# Patient Record
Sex: MALE | Race: WHITE | Employment: OTHER | URBAN - METROPOLITAN AREA
[De-identification: names, ages, dates, MRNs, and addresses within clinical notes are randomized per-mention and may not be internally consistent; named-entity substitution may affect disease eponyms.]

---

## 2019-04-10 ENCOUNTER — HOSPITAL ENCOUNTER (INPATIENT)
Age: 74
LOS: 22 days | Discharge: HOME HEALTH | DRG: 560 | End: 2019-05-02
Attending: PHYSICAL MEDICINE & REHABILITATION | Admitting: PHYSICAL MEDICINE & REHABILITATION
Payer: OTHER GOVERNMENT

## 2019-04-10 DIAGNOSIS — F41.9 ANXIETY: ICD-10-CM

## 2019-04-10 DIAGNOSIS — S32.82XD MULTIPLE CLOSED FRACTURES OF PELVIS WITHOUT DISRUPTION OF PELVIC RING WITH ROUTINE HEALING: Primary | ICD-10-CM

## 2019-04-10 PROBLEM — R53.81 DEBILITY: Status: ACTIVE | Noted: 2019-04-10

## 2019-04-10 PROCEDURE — 6370000000 HC RX 637 (ALT 250 FOR IP): Performed by: PHYSICAL MEDICINE & REHABILITATION

## 2019-04-10 PROCEDURE — 94640 AIRWAY INHALATION TREATMENT: CPT

## 2019-04-10 PROCEDURE — 1280000000 HC REHAB R&B

## 2019-04-10 RX ORDER — IPRATROPIUM BROMIDE AND ALBUTEROL SULFATE 2.5; .5 MG/3ML; MG/3ML
1 SOLUTION RESPIRATORY (INHALATION) EVERY 4 HOURS PRN
Status: DISCONTINUED | OUTPATIENT
Start: 2019-04-10 | End: 2019-05-02 | Stop reason: HOSPADM

## 2019-04-10 RX ORDER — DIPHENHYDRAMINE HCL 25 MG
25 TABLET ORAL EVERY 6 HOURS PRN
Status: DISCONTINUED | OUTPATIENT
Start: 2019-04-10 | End: 2019-05-02 | Stop reason: HOSPADM

## 2019-04-10 RX ORDER — PANTOPRAZOLE SODIUM 40 MG/1
40 TABLET, DELAYED RELEASE ORAL
Status: DISCONTINUED | OUTPATIENT
Start: 2019-04-11 | End: 2019-05-02 | Stop reason: HOSPADM

## 2019-04-10 RX ORDER — GABAPENTIN 300 MG/1
1200 CAPSULE ORAL 3 TIMES DAILY
Status: DISCONTINUED | OUTPATIENT
Start: 2019-04-10 | End: 2019-05-02 | Stop reason: HOSPADM

## 2019-04-10 RX ORDER — HYDRALAZINE HYDROCHLORIDE 25 MG/1
50 TABLET, FILM COATED ORAL EVERY 6 HOURS PRN
Status: DISCONTINUED | OUTPATIENT
Start: 2019-04-10 | End: 2019-05-02 | Stop reason: HOSPADM

## 2019-04-10 RX ORDER — ONDANSETRON 4 MG/1
8 TABLET, ORALLY DISINTEGRATING ORAL EVERY 8 HOURS PRN
Status: DISCONTINUED | OUTPATIENT
Start: 2019-04-10 | End: 2019-05-02 | Stop reason: HOSPADM

## 2019-04-10 RX ORDER — OXYCODONE HYDROCHLORIDE 5 MG/1
5 TABLET ORAL EVERY 4 HOURS PRN
Status: DISCONTINUED | OUTPATIENT
Start: 2019-04-10 | End: 2019-05-02 | Stop reason: HOSPADM

## 2019-04-10 RX ORDER — ACETAMINOPHEN 325 MG/1
650 TABLET ORAL EVERY 4 HOURS PRN
Status: DISCONTINUED | OUTPATIENT
Start: 2019-04-10 | End: 2019-05-02 | Stop reason: HOSPADM

## 2019-04-10 RX ORDER — TRAZODONE HYDROCHLORIDE 50 MG/1
50 TABLET ORAL NIGHTLY PRN
Status: DISCONTINUED | OUTPATIENT
Start: 2019-04-10 | End: 2019-04-11

## 2019-04-10 RX ORDER — PRAVASTATIN SODIUM 40 MG
40 TABLET ORAL NIGHTLY
Status: DISCONTINUED | OUTPATIENT
Start: 2019-04-10 | End: 2019-05-02 | Stop reason: HOSPADM

## 2019-04-10 RX ORDER — AMLODIPINE BESYLATE 5 MG/1
5 TABLET ORAL DAILY
Status: DISCONTINUED | OUTPATIENT
Start: 2019-04-10 | End: 2019-05-02 | Stop reason: HOSPADM

## 2019-04-10 RX ORDER — BACLOFEN 10 MG/1
10 TABLET ORAL 3 TIMES DAILY
Status: DISCONTINUED | OUTPATIENT
Start: 2019-04-10 | End: 2019-05-02 | Stop reason: HOSPADM

## 2019-04-10 RX ORDER — GUAIFENESIN 600 MG/1
600 TABLET, EXTENDED RELEASE ORAL 2 TIMES DAILY
Status: DISCONTINUED | OUTPATIENT
Start: 2019-04-10 | End: 2019-04-11

## 2019-04-10 RX ORDER — TAMSULOSIN HYDROCHLORIDE 0.4 MG/1
0.4 CAPSULE ORAL DAILY
Status: DISCONTINUED | OUTPATIENT
Start: 2019-04-11 | End: 2019-05-02 | Stop reason: HOSPADM

## 2019-04-10 RX ORDER — OXYCODONE HYDROCHLORIDE 5 MG/1
10 TABLET ORAL EVERY 4 HOURS PRN
Status: DISCONTINUED | OUTPATIENT
Start: 2019-04-10 | End: 2019-05-02 | Stop reason: HOSPADM

## 2019-04-10 RX ORDER — ASPIRIN 81 MG/1
81 TABLET, CHEWABLE ORAL DAILY
Status: DISCONTINUED | OUTPATIENT
Start: 2019-04-10 | End: 2019-05-02 | Stop reason: HOSPADM

## 2019-04-10 RX ADMIN — OXYCODONE HYDROCHLORIDE 10 MG: 5 TABLET ORAL at 22:31

## 2019-04-10 RX ADMIN — ASPIRIN 81 MG 81 MG: 81 TABLET ORAL at 22:31

## 2019-04-10 RX ADMIN — BACLOFEN 10 MG: 10 TABLET ORAL at 22:31

## 2019-04-10 RX ADMIN — Medication 2 PUFF: at 20:11

## 2019-04-10 RX ADMIN — PRAVASTATIN SODIUM 40 MG: 40 TABLET ORAL at 22:31

## 2019-04-10 RX ADMIN — GABAPENTIN 1200 MG: 300 CAPSULE ORAL at 22:31

## 2019-04-10 ASSESSMENT — PAIN SCALES - GENERAL
PAINLEVEL_OUTOF10: 8
PAINLEVEL_OUTOF10: 8

## 2019-04-10 NOTE — PLAN OF CARE
[address]   [phone]    Lakshmi Cunningham    : 1945  Acct #: [de-identified]  MRN: [id number]   PHYSICIAN:  Gagandeep Gagnon MD  Primary Problem    Patient Active Problem List   Diagnosis    Debility       Rehabilitation Diagnosis:     Debility [R53.81]     2ADMIT DATE:4/10/2019    Patient Goals:Return to PLOF    Admitting Impairments:COPD, Anxiety,   Barriers: pain  Participation: Good pt verbalizes he will participate in all therapy. CARE PLAN     NURSING:  Lakshmi Cunningham while on this unit will:     [] Be continent of bowel and bladder     [x] Have an adequate number of bowel movements  [] Urinate with no urinary retention >300ml in bladder  [] Complete bladder protocol with bar removal  [] Maintain O2 SATs at ___%  [x] Have pain managed while on ARU       [] Be pain free by discharge   [x] Have no skin breakdown while on ARU  [] Have improved skin integrity via wound measurements  [] Have no signs/symptoms of infection at the wound site  [x] Be free from injury during hospitalization   [] Complete education with patient/family with understanding demonstrated for:  [] Adjustment   [] Other:   Nursing interventions may include bowel/bladder training, education for medical assistive devices, medication education, O2 saturation management, energy conservation, stress management techniques, fall prevention, alarms protocol, seating and positioning, skin/wound care, pressure relief instruction,dressing changes,  infection protection, DVT prophylaxis, and/or assistance with in room safety with transfers to bed, toilet, wheelchair, shower as well as bathroom activities and hygiene.      Patient/caregiver education for:   [] Disease/sustained injury/management      [x] Medication Use   [] Surgical intervention   [x] Safety   [x] Body mechanics and or joint protection   [x] Health maintenance         PHYSICAL THERAPY:  Goals: Short term goals  Time Frame for Short term goals: To be met by 4/18/19  Short term goal 1: Pt to perform supine to/from sit transfers at flat bed without rails with supervision. Short term goal 2: Pt to perform sit to/from stand at [facility] with CGA. Short term goal 3: Pt to ambulate 50 ft with LRAD with CGA for household mobility. Short term goal 4: Pt to propel wheelchair 50 ft on level surface with mod I for household mobility. Short term goal 5: Pt to demonstrate car transfer with RW with mod A x 1. Long term goals  Time Frame for Long term goals : To be met by 4/25/19  Long term goal 1: Pt to perform supine to/from sit transfers at flat bed without rails with mod I.  Long term goal 2: Pt to perform sit to/from stand at [facility] with mod I.  Long term goal 3: Pt to ambulate 150 ft with LRAD with mod I for community mobility. Long term goal 4: Pt to manage 1 curb step with wheelchair or LRAD with mod I for home entry. Long term goal 5: Pt to demonstrate car transfer with RW with min A x 1. These goals were reviewed with this patient at the time of assessment and Gokul Mccrary is in agreement.      Plan of Care: Pt to be seen 5 out of 7 days per week, 90   mins (exact) per day for 14days (exact)                   Current Treatment Recommendations: Strengthening, Functional Mobility Training, Wheelchair Mobility Training, Neuromuscular Re-education, Home Exercise Program, Equipment Evaluation, Education, & procurement, Safety Education & Training, Gait Training, Transfer Training, ROM, Balance Training, Endurance Training, Stair training, Pain Management, Patient/Caregiver Education & Training, Positioning      OCCUPATIONAL THERAPY:  Goals:             Short term goals  Time Frame for Short term goals: to be met by 4/18/19  Short term goal 1: Pt will complete LB ADLs with min A with AE as needed  Short term goal 2: Pt will complete functional transfers with Min A  Short term goal 3: Pt will complete toileting with min A or less  Short term goal 4: pt will adhere to WB status with all transfers and mobility :  Long term goals  Time Frame for Long term goals : to be met 4/25/19  Long term goal 1: Pt will complete ADLs Mod I with AE as needed  Long term goal 2: Pt will complete all fucntional transfers Mod I  Long term goal 3: Pt will complete toileting Mod I  Long term goal 4: Pt will tolerate standing at sink for grooming tasks >5 min w/o fatigue or LOB   Long term goal 5: Pt will complete light home mgmt/meal prep tasks Mod I :    These goals were reviewed with this patient at the time of assessment and Delmar Chavira is in agreement    Plan of Care:  Pt to be seen 5 out of 7 days per week, 90 mins (exact) per day for 14 days (exact)  Current Treatment Recommendations: Strengthening, Balance Training, Functional Mobility Training, Endurance Training, Patient/Caregiver Education & Training, Equipment Evaluation, Education, & procurement, Self-Care / ADL      SPEECH THERAPY: Goals will be left blank if speech is not following this patient. Goals: These goals were reviewed with this patient at the time of assessment and Delmar Chavira is in agreement    Plan of Care: Pt to be seen 5 out of 7 days per week, 180  mins (exact) per day for 12 days (exact)             Dysphagia:             Speech/Language/Cognition:       CASE MANAGEMENT:  Goals:   Assist patient/family with discharge planning, patient/family counseling,   and coordination with insurance during ARU stay.       Admission Period/Goal FIM SCORES  FIM Admit/Goal Score   Eating/Swallowing    Grooming    Bathing    Upper Body Dressing    Lower Body Dressing    Toileting    Bladder Dewayne, Accidents = FIM    Bowel  Dewayne, Accidents = FIM    Bed/Chair Transfer    Toilet Transfer    Tub/Shower Transfer     Locomotion:  Ambulation (Walk) / Wheelchair:  W = walk , w/c = wheelchair  Distance: resection, and RCC s/p partial nephrectomy who initially presented to [facility] on 4/2/19 after sustaining a fall. The patient fell approximately 5 feet from ladder. No LOC. Found to have right acetabular fx, right superior and inferior pubic rami fx, and sacral fx. Non-operative management and toe touch weight bearing was recommended on the RLE. Also with intramuscular hematoma in right obturator and priformis. Course was complicated by COPD exacerbation. Treated with steroids and bronchodilators. Now presents to ARU with impaired mobility and self-care below his baseline. Currently, patient reports moderate to severe right hip, lower abdomen, and groin pain. He denies any radiating tingling/numbness. Pain is worse with movement. Improved with rest and medication. He reports his breathing is improving but still has productive cough. Patient is feeling generally anxious. He has a history of PTSD and has not been receiving his home lorazepam.         This plan has been reviewed with Maday Armenta on ____________  in a language the patient understands. Maday Armenta has had the opportunity to include input with the therapy team.      I have reviewed this initial plan of care and agree with its contents:    Title   Name    Date    Time    Physician: Dr. Ale Dorsey    Case Mgmt: Dano Suárez MSW, LSW    OT: Kian Harris OTR/L    PT: Michel Luis, PT, DPT   #[id number]     RN: Liliana Whitaker RN    ST:    : Marciano Ewing.  Lisandra Keenan     Other:

## 2019-04-11 LAB
A/G RATIO: 1.1 (ref 1.1–2.2)
ALBUMIN SERPL-MCNC: 3.5 G/DL (ref 3.4–5)
ALP BLD-CCNC: 79 U/L (ref 40–129)
ALT SERPL-CCNC: 20 U/L (ref 10–40)
ANION GAP SERPL CALCULATED.3IONS-SCNC: 9 MMOL/L (ref 3–16)
AST SERPL-CCNC: 21 U/L (ref 15–37)
BASOPHILS ABSOLUTE: 0 K/UL (ref 0–0.2)
BASOPHILS RELATIVE PERCENT: 0.3 %
BILIRUB SERPL-MCNC: 1.1 MG/DL (ref 0–1)
BUN BLDV-MCNC: 24 MG/DL (ref 7–20)
CALCIUM SERPL-MCNC: 8.7 MG/DL (ref 8.3–10.6)
CHLORIDE BLD-SCNC: 97 MMOL/L (ref 99–110)
CO2: 29 MMOL/L (ref 21–32)
CREAT SERPL-MCNC: 1 MG/DL (ref 0.8–1.3)
EOSINOPHILS ABSOLUTE: 0.1 K/UL (ref 0–0.6)
EOSINOPHILS RELATIVE PERCENT: 1.2 %
GFR AFRICAN AMERICAN: >60
GFR NON-AFRICAN AMERICAN: >60
GLOBULIN: 3.1 G/DL
GLUCOSE BLD-MCNC: 95 MG/DL (ref 70–99)
HCT VFR BLD CALC: 35.6 % (ref 40.5–52.5)
HEMOGLOBIN: 12.3 G/DL (ref 13.5–17.5)
LYMPHOCYTES ABSOLUTE: 0.8 K/UL (ref 1–5.1)
LYMPHOCYTES RELATIVE PERCENT: 7.1 %
MCH RBC QN AUTO: 31.5 PG (ref 26–34)
MCHC RBC AUTO-ENTMCNC: 34.6 G/DL (ref 31–36)
MCV RBC AUTO: 91.1 FL (ref 80–100)
MONOCYTES ABSOLUTE: 0.9 K/UL (ref 0–1.3)
MONOCYTES RELATIVE PERCENT: 8.5 %
NEUTROPHILS ABSOLUTE: 8.8 K/UL (ref 1.7–7.7)
NEUTROPHILS RELATIVE PERCENT: 82.9 %
PDW BLD-RTO: 13.2 % (ref 12.4–15.4)
PLATELET # BLD: 348 K/UL (ref 135–450)
PMV BLD AUTO: 7.2 FL (ref 5–10.5)
POTASSIUM REFLEX MAGNESIUM: 4.8 MMOL/L (ref 3.5–5.1)
RBC # BLD: 3.91 M/UL (ref 4.2–5.9)
SODIUM BLD-SCNC: 135 MMOL/L (ref 136–145)
TOTAL PROTEIN: 6.6 G/DL (ref 6.4–8.2)
WBC # BLD: 10.6 K/UL (ref 4–11)

## 2019-04-11 PROCEDURE — 97535 SELF CARE MNGMENT TRAINING: CPT

## 2019-04-11 PROCEDURE — 85025 COMPLETE CBC W/AUTO DIFF WBC: CPT

## 2019-04-11 PROCEDURE — 97162 PT EVAL MOD COMPLEX 30 MIN: CPT

## 2019-04-11 PROCEDURE — 1280000000 HC REHAB R&B

## 2019-04-11 PROCEDURE — 97530 THERAPEUTIC ACTIVITIES: CPT

## 2019-04-11 PROCEDURE — 36415 COLL VENOUS BLD VENIPUNCTURE: CPT

## 2019-04-11 PROCEDURE — 6370000000 HC RX 637 (ALT 250 FOR IP): Performed by: PHYSICAL MEDICINE & REHABILITATION

## 2019-04-11 PROCEDURE — 80053 COMPREHEN METABOLIC PANEL: CPT

## 2019-04-11 PROCEDURE — 97166 OT EVAL MOD COMPLEX 45 MIN: CPT

## 2019-04-11 PROCEDURE — 94640 AIRWAY INHALATION TREATMENT: CPT

## 2019-04-11 PROCEDURE — 97542 WHEELCHAIR MNGMENT TRAINING: CPT

## 2019-04-11 PROCEDURE — 97110 THERAPEUTIC EXERCISES: CPT

## 2019-04-11 PROCEDURE — 97116 GAIT TRAINING THERAPY: CPT

## 2019-04-11 RX ORDER — SENNA AND DOCUSATE SODIUM 50; 8.6 MG/1; MG/1
1 TABLET, FILM COATED ORAL 2 TIMES DAILY
Status: DISCONTINUED | OUTPATIENT
Start: 2019-04-11 | End: 2019-05-02 | Stop reason: HOSPADM

## 2019-04-11 RX ORDER — BUDESONIDE AND FORMOTEROL FUMARATE DIHYDRATE 160; 4.5 UG/1; UG/1
2 AEROSOL RESPIRATORY (INHALATION) 2 TIMES DAILY
COMMUNITY

## 2019-04-11 RX ORDER — LORAZEPAM 1 MG/1
1 TABLET ORAL NIGHTLY PRN
Status: DISCONTINUED | OUTPATIENT
Start: 2019-04-11 | End: 2019-05-02 | Stop reason: HOSPADM

## 2019-04-11 RX ORDER — ASPIRIN 81 MG/1
81 TABLET ORAL DAILY
Status: ON HOLD | COMMUNITY
End: 2019-05-02 | Stop reason: SDUPTHER

## 2019-04-11 RX ORDER — OMEPRAZOLE 20 MG/1
20 CAPSULE, DELAYED RELEASE ORAL DAILY
COMMUNITY

## 2019-04-11 RX ORDER — AMLODIPINE BESYLATE 2.5 MG/1
5 TABLET ORAL DAILY
Status: ON HOLD | COMMUNITY
End: 2019-05-02 | Stop reason: SDUPTHER

## 2019-04-11 RX ORDER — LORAZEPAM 2 MG/1
2 TABLET ORAL EVERY 6 HOURS PRN
Status: ON HOLD | COMMUNITY
End: 2019-05-02 | Stop reason: SDUPTHER

## 2019-04-11 RX ORDER — PRAVASTATIN SODIUM 40 MG
40 TABLET ORAL DAILY
Status: ON HOLD | COMMUNITY
End: 2019-05-02 | Stop reason: SDUPTHER

## 2019-04-11 RX ORDER — BACLOFEN 10 MG/1
10 TABLET ORAL 3 TIMES DAILY
Status: ON HOLD | COMMUNITY
End: 2019-05-02 | Stop reason: SDUPTHER

## 2019-04-11 RX ORDER — TAMSULOSIN HYDROCHLORIDE 0.4 MG/1
0.4 CAPSULE ORAL NIGHTLY
Status: ON HOLD | COMMUNITY
End: 2019-05-02 | Stop reason: SDUPTHER

## 2019-04-11 RX ORDER — MAGNESIUM OXIDE 400 MG/1
210 TABLET ORAL DAILY
Status: ON HOLD | COMMUNITY
End: 2019-05-02 | Stop reason: SDUPTHER

## 2019-04-11 RX ORDER — GUAIFENESIN 600 MG/1
600 TABLET, EXTENDED RELEASE ORAL 2 TIMES DAILY WITH MEALS
Status: DISCONTINUED | OUTPATIENT
Start: 2019-04-11 | End: 2019-05-02 | Stop reason: HOSPADM

## 2019-04-11 RX ORDER — GABAPENTIN 300 MG/1
300 CAPSULE ORAL 3 TIMES DAILY
Status: ON HOLD | COMMUNITY
End: 2019-05-02 | Stop reason: SDUPTHER

## 2019-04-11 RX ADMIN — GABAPENTIN 1200 MG: 300 CAPSULE ORAL at 21:37

## 2019-04-11 RX ADMIN — OXYCODONE HYDROCHLORIDE 10 MG: 5 TABLET ORAL at 21:45

## 2019-04-11 RX ADMIN — TIOTROPIUM BROMIDE 18 MCG: 18 CAPSULE ORAL; RESPIRATORY (INHALATION) at 07:20

## 2019-04-11 RX ADMIN — PRAVASTATIN SODIUM 40 MG: 40 TABLET ORAL at 21:37

## 2019-04-11 RX ADMIN — BACLOFEN 10 MG: 10 TABLET ORAL at 10:41

## 2019-04-11 RX ADMIN — Medication 2 PUFF: at 07:21

## 2019-04-11 RX ADMIN — ASPIRIN 81 MG 81 MG: 81 TABLET ORAL at 10:41

## 2019-04-11 RX ADMIN — OXYCODONE HYDROCHLORIDE 10 MG: 5 TABLET ORAL at 10:55

## 2019-04-11 RX ADMIN — MAGNESIUM GLUCONATE 500 MG ORAL TABLET 400 MG: 500 TABLET ORAL at 10:41

## 2019-04-11 RX ADMIN — GABAPENTIN 1200 MG: 300 CAPSULE ORAL at 14:30

## 2019-04-11 RX ADMIN — BACLOFEN 10 MG: 10 TABLET ORAL at 14:30

## 2019-04-11 RX ADMIN — GABAPENTIN 1200 MG: 300 CAPSULE ORAL at 10:41

## 2019-04-11 RX ADMIN — PANTOPRAZOLE SODIUM 40 MG: 40 TABLET, DELAYED RELEASE ORAL at 06:22

## 2019-04-11 RX ADMIN — TAMSULOSIN HYDROCHLORIDE 0.4 MG: 0.4 CAPSULE ORAL at 10:40

## 2019-04-11 RX ADMIN — AMLODIPINE BESYLATE 5 MG: 5 TABLET ORAL at 10:41

## 2019-04-11 RX ADMIN — SENNOSIDES AND DOCUSATE SODIUM 1 TABLET: 8.6; 5 TABLET ORAL at 21:37

## 2019-04-11 RX ADMIN — Medication 2 PUFF: at 20:59

## 2019-04-11 RX ADMIN — LORAZEPAM 1 MG: 1 TABLET ORAL at 21:37

## 2019-04-11 RX ADMIN — VITAMIN D, TAB 1000IU (100/BT) 1000 UNITS: 25 TAB at 10:40

## 2019-04-11 RX ADMIN — BACLOFEN 10 MG: 10 TABLET ORAL at 21:37

## 2019-04-11 RX ADMIN — GUAIFENESIN 600 MG: 600 TABLET, EXTENDED RELEASE ORAL at 17:19

## 2019-04-11 ASSESSMENT — PAIN DESCRIPTION - LOCATION
LOCATION: GROIN

## 2019-04-11 ASSESSMENT — PAIN SCALES - GENERAL
PAINLEVEL_OUTOF10: 4
PAINLEVEL_OUTOF10: 4
PAINLEVEL_OUTOF10: 9
PAINLEVEL_OUTOF10: 8

## 2019-04-11 ASSESSMENT — PAIN DESCRIPTION - PAIN TYPE: TYPE: ACUTE PAIN

## 2019-04-11 NOTE — PLAN OF CARE
Nutrition Problem: Inadequate oral intake  Intervention: Food and/or Nutrient Delivery: Continue current diet, Start ONS  Nutritional Goals: Pt will have PO intakes of 50% or greater during ARU stay

## 2019-04-11 NOTE — H&P
Department of Physical Medicine & Rehabilitation  History & Physical      Patient Identification:  Tony Jacobs  : 1945  Admit date: 4/10/2019   Attending provider: Opal Carter MD        Primary care provider: No primary care provider on file. Chief Complaint: right hip pain    History of Present Illness/Hospital Course:  Mr. Tony Jacobs is a 69 yo M with pmh HTN, HLD, COPD, lumbar radiculopathy, laryngeal cancer s/p resection, and RCC s/p partial nephrectomy who initially presented to [facility] on 19 after sustaining a fall. The patient fell approximately 5 feet from ladder. No LOC. Found to have right acetabular fx, right superior and inferior pubic rami fx, and sacral fx. Non-operative management and toe touch weight bearing was recommended on the RLE. Also with intramuscular hematoma in right obturator and priformis. Course was complicated by COPD exacerbation. Treated with steroids and bronchodilators. Now presents to ARU with impaired mobility and self-care below his baseline. Currently, patient reports moderate to severe right hip, lower abdomen, and groin pain. He denies any radiating tingling/numbness. Pain is worse with movement. Improved with rest and medication. He reports his breathing is improving but still has productive cough. Patient is feeling generally anxious.  He has a history of PTSD and has not been receiving his home lorazepam.     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)  Home set-up: home with [address], 1 step to enter    Past Medical History:   Diagnosis Date    Allergic rhinitis     Anxiety     [facility]     [facility]se[facility] of lung [facility]     GERD (gastroesophageal reflux disease)     Hypercholesteremia     Hypertension     Lumbar radiculopathy     PTSD (post-traumatic stress immediate release tablet 5 mg  5 mg Oral Q4H PRN Abhilash Verdin MD        Or    oxyCODONE (ROXICODONE) immediate release tablet 10 mg  10 mg Oral Q4H PRN Abhilash Verdin MD   10 mg at 04/11/19 1055    amLODIPine (NORVASC) tablet 5 mg  5 mg Oral Daily Abhilash Verdin MD   5 mg at 04/11/19 1041    aspirin chewable tablet 81 mg  81 mg Oral Daily Abhilash Verdin MD   81 mg at 04/11/19 1041    baclofen (LIORESAL) tablet 10 mg  10 mg Oral TID Abhilash Verdin MD   10 mg at 04/11/19 1041    mometasone-formoterol (DULERA) 200-5 MCG/ACT inhaler 2 puff  2 puff Inhalation BID Abhilash Verdin MD   2 puff at 04/11/19 0721    vitamin D (CHOLECALCIFEROL) tablet 1,000 Units  1,000 Units Oral Daily Abhilash Verdin MD   1,000 Units at 04/11/19 1040    tamsulosin (FLOMAX) capsule 0.4 mg  0.4 mg Oral Daily Abhilash Verdin MD   0.4 mg at 04/11/19 1040    gabapentin (NEURONTIN) capsule 1,200 mg  1,200 mg Oral TID Abhilash Verdin MD   1,200 mg at 04/11/19 1041    magnesium oxide (MAG-OX) tablet 400 mg  400 mg Oral Daily Abhilash Verdin MD   400 mg at 04/11/19 1041    pantoprazole (PROTONIX) tablet 40 mg  40 mg Oral QAM AC Abhilash Verdin MD   40 mg at 04/11/19 0622    pravastatin (PRAVACHOL) tablet 40 mg  40 mg Oral Nightly Abhilash Verdin MD   40 mg at 04/10/19 2231    tiotropium (SPIRIVA) inhalation capsule 18 mcg  18 mcg Inhalation Daily Abhilash Verdin MD   18 mcg at 04/11/19 0720    acetaminophen (TYLENOL) tablet 650 mg  650 mg Oral Q4H PRN Abhilash Verdin MD        magnesium hydroxide (MILK OF MAGNESIA) 400 MG/5ML suspension 30 mL  30 mL Oral Daily PRN Abhilash Verdin MD        enoxaparin (LOVENOX) injection 40 mg  40 mg Subcutaneous Daily Abhilash Verdin MD             REVIEW OF SYSTEMS:   CONSTITUTIONAL: negative for fevers, chills, diaphoresis, appetite change, night sweats, unexpected weight change, +fatigue.     EYES: negative for blurred vision, eye discharge, visual disturbance and icterus. HEENT: negative for hearing loss, tinnitus, ear drainage, sinus pressure, nasal congestion, epistaxis and snoring. RESPIRATORY: Negative for hemoptysis, +cough, sputum production. CARDIOVASCULAR: negative for chest pain, palpitations, exertional chest pressure/discomfort, syncope, edema   GASTROINTESTINAL: negative for nausea, vomiting, diarrhea, blood in stool, abdominal pain, constipation. GENITOURINARY: negative for frequency, dysuria, urinary incontinence, decreased urine volume, and hematuria. HEMATOLOGIC/LYMPHATIC: negative for easy bruising, bleeding and lymphadenopathy. ALLERGIC/IMMUNOLOGIC: negative for recurrent infections, angioedema, anaphylaxis and drug reactions. ENDOCRINE: negative for weight changes and diabetic symptoms including polyuria, polydipsia and polyphagia. MUSCULOSKELETAL: refer to HPI  NEUROLOGICAL: negative for headaches, slurred speech, unilateral weakness. PSYCHIATRIC/BEHAVIORAL: negative for hallucinations, behavioral problems, confusion and agitation. +anxiety    All pertinent positives are noted in the HPI. Physical Examination:  Vitals:   Patient Vitals for the past 24 hrs:   BP Temp Temp src Pulse Resp SpO2 Height Weight   04/11/19 1030 114/69 97.9 °F (36.6 °C) Oral 83 18 93 % -- --   04/11/19 0723 -- -- -- -- -- 93 % -- --   04/11/19 0615 -- -- -- -- -- -- -- 174 lb 2.6 oz (79 kg)   04/10/19 2011 -- -- -- -- 18 93 % -- --   04/1945 (!) 144/55 98.3 °F (36.8 °C) Oral 73 20 91 % -- --   04/10/19 1815 (!) 110/54 98.7 °F (37.1 °C) Oral 78 18 92 % 6' (1.829 m) --       Const: Alert. WDWN. No distress  Eyes: Conjunctiva noninjected, no icterus noted; pupils equal, round, and reactive to light. HENT: Atraumatic, normocephalic; Oral mucosa moist  Neck: Trachea midline, neck supple. No thyromegaly noted.   CV: Regular rate and rhythm, no murmur rub or gallop noted  Resp: Lung sounds distant but clear to auscultation bilaterally, no rales wheezes or ronchi, no retractions. Respirations unlabored. GI: Soft, nontender, nondistended. Normal bowel sounds. No palpable masses. Skin: Normal temperature and turgor. No rashes or breakdown noted. Ext: No significant edema appreciated. No varicosities. MSK: Right hip ROM limited by pain. No joint erythema, warmth noted. AROM otherwise intact. Neuro:   -Mental status: Alert. Oriented to person, place, time, situation.   -Language: Speech fluent  -Cranial nerves: VFF, PERRL, EOMI, Facial sensation intact, Face symmetric, Hearing intact, Palate elevation symmetric, Shoulder shrug intact. Tongue midline.   -Sensation intact to light touch. -Motor examination reveals normal strength in all four limbs diffusely (RLE not fully tested due to weight bearing restrictions). -No abnormalities with finger/nose noted. -Reflexes diminished, symmetric. Negative Irasema  Psych: Stable mood, normal judgement, normal affect     Lab Results   Component Value Date    WBC 10.6 04/11/2019    HGB 12.3 (L) 04/11/2019    HCT 35.6 (L) 04/11/2019    MCV 91.1 04/11/2019     04/11/2019     No results found for: INR, PROTIME  Lab Results   Component Value Date    CREATININE 1.0 04/11/2019    BUN 24 (H) 04/11/2019     (L) 04/11/2019    K 4.8 04/11/2019    CL 97 (L) 04/11/2019    CO2 29 04/11/2019     Lab Results   Component Value Date    ALT 20 04/11/2019    AST 21 04/11/2019    ALKPHOS 79 04/11/2019    BILITOT 1.1 (H) 04/11/2019       Available imaging, medical testing, and laboratory data from  has been reviewed. WBC 9.8, Hgb 11.3, plt 231  Na 136, K 4.3, BUN 28, C 1.05    CT Pelvis  Acute non-diplaced fracture of the right puboacetabular junction, involving the anterosuperior margin of the right acetabulum. Acute comminuted segmental fracture of the right inferior pubic ramus with extension to the inferior right pubic body.    Acute minimally displaced fracture of the right hemisacrum with extension to the right S1 and S2 neural formaina, as well as transverse fracture plan through S2-3 sacral body. Multifocal intramuscular hematomas involving the right piriformis, right obturator externus, and right obturator internus muscles. CXR - no acute cardiopulmonary process    CT Chest/Abdomen/Pelvis  No acute trumatic abnormality. Circumferential wall thickening of the distal esophagus may rosario related to esophagitis. Similar appearance of multiple previously described pelvic fractures as well as asymmetric enlargement/intramuscular hematoma involving right obturator and piriformis musculature. Stranding osei the rightward aspect of the bladder without deifinite evidence of bladder injury otherwise. CT Head   No acute intracranial hemorrhage or mass effect. Chronic small vessel ischemic changes and diffuse cerebral atrophy. CT Cervical Spine  No acute fracture or subluxation. CT Thoracic spine  Mild height loss of T12, likely chronic. No acute fracture. CT Lumbar spine  No acute fracture. Pelvic fractures reported separately. Body mass index is 23.62 kg/m². POST ADMISSION PHYSICIAN EVALUATION  The patient has agreed to being admitted to our comprehensive inpatient  rehabilitation facility consisting of at least 180 minutes of therapy a day,  5 out of 7 days a week. The patient/family has a good understanding of our discharge process. The  patient has potential to make improvement and is in need of at least two of  the following multidisciplinary therapies including but not limited to  physical, occupational, respiratory, and speech, nutritional services, wound care, and prosthetics and orthotics. Given the patients complex condition  and risk of further medical complications, rehabilitation services cannot be  safely provided at a lower level of care such as a skilled nursing facility.   I have compared the patients medical and functional status at the time of the  preadmission screening and the same on this date, and there are no significant changes. By signing this document, I acknowledge that I have personally performed a  full physical examination on this patient within 24 hours of admission to  this inpatient rehabilitation facility and have determined the patient to be  able to tolerate the above course of treatment at an intensive level for a  reasonable period of time. I will be completing a detailed individualized  Plan of Care for this patient by day four of the patients stay based upon the  Preadmission Screen, this Post-Admission Evaluation, and the therapy  evaluations. Barriers: TTWB status RLE, decreased balance, endurance, respiratory insufficiency, medical comorbidities  Services Required: PT, OT  Goals: Mandy for mobility and ADLs  Prognosis: Good  Anticipated Dispo: home with son  ELOS: 7-10 days    Rehabilitation Diagnosis:   Orthopedic, 8.11, Unilateral Hip Fracture      Assessment and Plan:    Right acetabulum, inferior/superior pubic rami, and sacral fractures - Managed non-operatively. Pain control. TTWB RLE and WBAT LLE. PT/OT. Intramuscular hematoma (piriformis, obturator) - Monitor Hgb. COPD exacerbation - Completed steroids. Dulera, spiriva, mucinex, prn duonebs. HTN - amlodipine    HLD - pravastatin    GERD - pantoprazole    PTSD - Restart lorazepam at lower dose as he has been off of this for >1 week and is now also on oxycodone. Pending response consider increasing to home dose of 2mg qhs. Bladder - high risk retention - Monitor PVRs, SC prn >300cc. Flomax. Bowel - high risk constipation - colace=senna BID, PRN MoM. follow bowel movements. Enema or suppository if needed. Pain control - Gabapentin (for chronic neuropathy), baclofen, prn oxycodone    DVT PPx - lovenox    FULL CODE    Florence Grayson MD 4/11/2019, 1:14 PM

## 2019-04-11 NOTE — PROGRESS NOTES
RESPIRATORY THERAPY ASSESSMENT    Name:  Don Lao  Medical Record Number:  [id number]  Age: 68 y.o. Gender: female  : 1945  Today's Date:  4/10/2019  Room:  0161/0161-01    Assessment     Is the patient being admitted for a COPD or Asthma exacerbation? No   (If yes the patient will be seen every 4 hours for the first 24 hours and then reassessed)    Patient Admission Diagnosis      Allergies  No Known Allergies    Minimum Predicted Vital Capacity:     1096          Actual Vital Capacity:      N/A              Pulmonary History:No history  Home Oxygen Therapy:  room air  Home Respiratory Therapy:Albuterol, Mometasone/Formoterol and Tiotropium Bromide   Current Respiratory Therapy:  Duoneb HHN PRN, Dulera BID, Spiriva daily  Treatment Type: MDI  Medications: Mometasone/Formoterol    Respiratory Severity Index(RSI)   Patients with orders for inhalation medications, oxygen, or any therapeutic treatment modality will be placed on Respiratory Protocol. They will be assessed with the first treatment and at least every 72 hours thereafter. The following severity scale will be used to determine frequency of treatment intervention. Smoking History: No Smoking History = 0    Social History  Social History     Tobacco Use    Smoking status: Not on file   Substance Use Topics    Alcohol use: Not on file    Drug use: Not on file       Recent Surgical History: None = 0  Past Surgical History  No past surgical history on file.     Level of Consciousness: Alert, Oriented, and Cooperative = 0    Level of Activity: Walking with assistance = 1    Respiratory Pattern: Regular Pattern; RR 8-20 = 0    Breath Sounds: Clear = 0    Sputum   ,  , Sputum How Obtained: None  Cough: Strong, spontaneous, non-productive = 0    Vital Signs   BP (!) 144/55   Pulse 73   Temp 98.3 °F (36.8 °C) (Oral)   Resp 18   Ht 6' (1.829 m)   SpO2 93%   SPO2 (COPD values may differ): Greater than or equal to 92% on room air = 0    Peak Flow (asthma only): not applicable = 0    RSI: 0-4 = See once and convert to home regimen or discontinue        Plan       Goals: medication delivery, mobilize retained secretions, volume expansion and improve oxygenation    Patient/caregiver was educated on the proper method of use for Respiratory Care Devices:  Yes      Level of patient/caregiver understanding able to:   ? Verbalize understanding   ? Demonstrate understanding       ? Teach back        ? Needs reinforcement       ? No available caregiver               ? Other:     Response to education:  Good     Is patient being placed on Home Treatment Regimen? Yes     Does the patient have everything they need prior to discharge? NA     Comments: Evaluated pt and reviewed chart    Plan of Care: Duoneb HHN PRN, Dulera BID, Spiriva daily     Electronically signed by Alley Wheeler RCP on 4/10/2019 at 9:22 PM    Respiratory Protocol Guidelines     1. Assessment and treatment by Respiratory Therapy will be initiated for medication and therapeutic interventions upon initiation of aerosolized medication. 2. Physician will be contacted for respiratory rate (RR) greater than 35 breaths per minute. Therapy will be held for heart rate (HR) greater than 140 beats per minute, pending direction from physician. 3. Bronchodilators will be administered via Metered Dose Inhaler (MDI) with spacer when the following criteria are met:  a. Alert and cooperative     b. HR < 140 bpm  c. RR < 30 bpm                d. Can demonstrate a 2-3 second inspiratory hold  4. Bronchodilators will be administered via Hand Held Nebulizer [facility]) to patients when ANY of the following criteria are met  a. Incognizant or uncooperative          b. Patients treated with HHN at Home        c. Unable to demonstrate proper use of MDI with spacer     d. RR > 30 bpm   5.  Bronchodilators will be delivered via Metered Dose Inhaler (MDI), HHN, Aerogen to intubated patients on mechanical ventilation. 6. Inhalation medication orders will be delivered and/or substituted as outlined below. Aerosolized Medications Ordering and Administration Guidelines:    1. All Medications will be ordered by a physician, and their frequency and/or modality will be adjusted as defined by the patients Respiratory Severity Index (RSI) score. 2. If the patient does not have documented COPD, consider discontinuing anticholinergics when RSI is less than 9.  3. If the bronchospasm worsens (increased RSI), then the bronchodilator frequency can be increased to a maximum of every 4 hours. If greater than every 4 hours is required, the physician will be contacted. 4. If the bronchospasm improves, the frequency of the bronchodilator can be decreased, based on the patient's RSI, but not less than home treatment regimen frequency. 5. Bronchodilator(s) will be discontinued if patient has a RSI less than 9 and has received no scheduled or as needed treatment for 72  Hrs. Patients Ordered on a Mucolytic Agent:    1. Must always be administered with a bronchodilator. 2. Discontinue if patient experiences worsened bronchospasm, or secretions have lessened to the point that the patient is able to clear them with a cough. Anti-inflammatory and Combination Medications:    1. If the patient lacks prior history of lung disease, is not using inhaled anti-inflammatory medication at home, and lacks wheezing by examination or by history for at least 24 hours, contact physician for possible discontinuation.

## 2019-04-11 NOTE — PROGRESS NOTES
Physical Therapy    Facility/Department: [facility]  Initial Assessment    NAME: Noman Flores  : 1945  MRN: [id number]    Date of Service: 2019    Discharge Recommendations:  Continue to assess pending progress   PT Equipment Recommendations  Equipment Needed: Yes  Mobility Devices: Whitley Mew: Rolling  Other: will continue to assess    Assessment   Body structures, Functions, Activity limitations: Decreased functional mobility ; Decreased strength;Decreased endurance;Decreased safe awareness;Decreased ROM; Decreased balance; Increased Pain      Assessment: Pt is a 67 yo M with pmh HTN, HLD, COPD, lumbar radiculopathy, laryngeal cancer s/p resection, and RCC s/p partial nephrectomy who initially presented to [facility] on 19 after sustaining a fall. The patient fell approximately 5 feet from ladder. No LOC. Found to have right acetabular fx, right superior and inferior pubic rami fx, and sacral fx. Non-operative management and foot flat touch down weight bearing status was recommended on the RLE. Also with intramuscular hematoma in right obturator and priformis. Course was complicated by COPD exacerbation. Treated with steroids and bronchodilators. Pt's PLOF was independent with transfers and gait without AD. He lives with and is the primary caregiver for adult son with Down Syndrome. He has limited/no family support/resources upon dc so he will need to be at least modified independent with functional mobility to be able to return home. Pt is appropriate for skilled PT services services to progress towards PLOF. Treatment Diagnosis: Debility  Prognosis: Fair;Good  Patient Education: Role of PT, safety awareness, AD management, gait training, exercises  REQUIRES PT FOLLOW UP: Yes  Activity Tolerance  Activity Tolerance: IBRAHIM noted with standing activities. HR 80bpm, spO2 98% on RA, 138/68mmHg at seated rest following ambulation.   During remainder of session,  note 91-93% spO2 on RA and HR up to 106 bpm with activity. Patient Diagnosis(es): Debility     has a past medical history of Allergic rhinitis, Anxiety, COPD (chronic obstructive pulmonary disease) ([address] Emphysema of lung ([address] GERD (gastroesophageal reflux disease), Hypercholesteremia, Hypertension, Lumbar radiculopathy, and PTSD (post-traumatic stress disorder). has no past surgical history on file. Restrictions  Position Activity Restriction  Other position/activity restrictions: Toe touch weightbearing on RLE, weightbearing as tolerated on LLE. Notify physician for pulse less than 50 or greater than 120, respiratory rate less than 12 or greater than 25,systolic BP less than 90 or greater than 341, diastolic BP less than 50 or greater than 100. Vision/Hearing  Vision: Impaired  Vision Exceptions: Wears glasses at all times  Hearing: Within functional limits     Subjective  General  Chart Reviewed: Yes  Patient assessed for rehabilitation services?: Yes  Additional Pertinent Hx: s/p fall resulting in pelvic and sacral fractures (non-operative treatment)  Family / Caregiver Present: No  Referring Practitioner: Bin Hunt MD  Referral Date : 04/10/19  Diagnosis: Debility  Follows Commands: Within Functional Limits  General Comment  Comments: Per RN, okay for PT eval. Pt sitting in wheelchair upon arrival.  Subjective  Subjective: Pt agreeable to evaluation.   Pain Screening  Patient Currently in Pain: Yes  Pain Assessment  Pain Assessment: 0-10  Pain Level: 9  Pain Location: Groin  Non-Pharmaceutical Pain Intervention(s): Ambulation/Increased Activity;Repositioned  Vital Signs  Patient Currently in Pain: Yes  Pre Treatment Pain Screening  Intervention List: Patient able to continue with treatment    Orientation  Orientation  Overall Orientation Status: Within Normal Limits(orient x 4)  Social/Functional History  Social/Functional History  Lives With: Son(adult son with Down Syndrome)  Type of Home: House  Home Layout: Able to Live on Main level with bedroom/bathroom, Two level  Home Access: Stairs to enter without rails  Entrance Stairs - Number of Steps: 1(4-inch step through garage entrance)  Bathroom Shower/Tub: Walk-in shower  Home Equipment: (reports no equipment)  ADL Assistance: Independent  Homemaking Assistance: Needs assistance(cleaning lady every 2 wks, independent with laundry)  Meal Prep: Other (comment)(reports eating out only, no cooking at home)  Ambulation Assistance: Independent  Active : Yes  Occupation: Retired  Type of occupation: stock broker  Additional Comments: 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 Down's Syndrome    Objective   ROM: UE WFL for w/c propulsion and walker use. B ankle AROM and B knee AROM: WFL   L hip AROM: WFL, R hip AROM: limited by 50% due to pain          Strength RLE  Strength RLE: Exception  Comment: per gross AROM observation  R Hip Flexion: 2/5  R Knee Flexion: At least;3/5  R Knee Extension: At least;3/5  R Ankle Dorsiflexion: At least;3/5  R Ankle Plantar flexion: At least;3/5  Strength LLE  Strength LLE: WFL  Comment: per gross AROM observation, but note painful at times        Bed mobility  Rolling to Left: Stand by assistance(slow)  Rolling to Right: Stand by assistance(slow)  Supine to Sit: Minimal assistance(trunk lift assist )  Sit to Supine: Minimal assistance(lift RLE  into bed)  Scooting: Supervision(able to self scoot body down to foot of bed in Trendelenburg with side rail use)  Transfers  Sit to Stand: Moderate Assistance(FWW)  Stand to sit: Moderate Assistance(FWW)  Bed to Chair: Moderate assistance(FWW Mod to/from  bed<>WC )  Stand Pivot Transfers:  Moderate Assistance  Car Transfer: 2 Person Assistance  Comment: R LE assist in/out of car, set-up assist for car and w/c; W/c to car: max A x 1 at , car to w/c: max A + mod A (2-person) at [address] [place] [address]  Ambulation  Ambulation?: Yes  WB Status: Foot flat touch down weight bearing RLE, WBAT LLE  More Ambulation?: No  Ambulation Transfer Training, ROM, Balance Training, Endurance Training, Stair training, Pain Management, Patient/Caregiver Education & Training, Positioning  Safety Devices  Type of devices: All fall risk precautions in place, Bed alarm in place, Left in bed, Call light within reach  Restraints  Initially in place: No    OutComes Score    Goals  Short term goals  Time Frame for Short term goals: To be met in 7 days (4/18/19)  Short term goal 1: Pt to perform supine to/from sit transfers at flat bed without rails with supervision. Short term goal 2: Pt to perform sit to/from stand at [facility] with CGA. Short term goal 3: Pt to ambulate 50 ft with LRAD with CGA for household mobility. Short term goal 4: Pt to propel wheelchair 50 ft on level surface with mod I for household mobility. Long term goals  Time Frame for Long term goals : To be met in 14 days (4/25/19)  Long term goal 1: Pt to perform supine to/from sit transfers at flat bed without rails with mod I.  Long term goal 2: Pt to perform sit to/from stand at [facility] with mod I.  Long term goal 3: Pt to ambulate 150 ft with LRAD with mod I for community mobility. Long term goal 4: Pt to manage 1 curb step with wheelchair or LRAD with mod I for home entry.   Patient Goals   Patient goals : \"to be home and able to drive and do what I was doing\"       Therapy Time   Individual Concurrent Group Co-treatment   Time In 0845         Time Out 1015         Minutes 90         Timed Code Treatment Minutes: 75 Minutes (15 min Eval)       Theodora Rosales, PT

## 2019-04-11 NOTE — PROGRESS NOTES
4 Eyes Skin Assessment     The patient is being assess for   Admission    I agree that 2 RN's have performed a thorough Head to Toe Skin Assessment on the patient. ALL assessment sites listed below have been assessed. Areas assessed by both nurses:   [x]   Head, Face, and Ears   [x]   Shoulders, Back, and Chest, Abdomen  [x]   Arms, Elbows, and Hands   [x]   Coccyx, Sacrum, and Ischium  [x]   Legs, Feet, and Heels         large bruise inside of right thigh, lateral right hip      Co-signer eSignature: Electronically signed by Darya Corona RN on 4/11/19 at 4:03 AM    Does the Patient have Skin Breakdown?   No          Thad Prevention initiated:  Yes   Wound Care Orders initiated:  No      WOC nurse consulted for Pressure Injury (Stage 3,4, Unstageable, DTI, NWPT, Complex wounds)and New or Established Ostomies:  No      Primary Nurse eSignature: Electronically signed by Desmond Alvarado RN on 4/11/19 at 3:59 AM

## 2019-04-11 NOTE — PROGRESS NOTES
Occupational Therapy   Occupational Therapy Initial Assessment and treatment  Facility/Department: [facility] ARU    Date: 2019   Patient Name: Kevin Baker  MRN: [id number]     : 1945    Date of Service: 2019    Discharge Recommendations:  Home with Home health OT, Home with assist PRN  OT Equipment Recommendations  Other: will likely need shower chair, possible RTS as needed    Assessment   Performance deficits / Impairments: Decreased functional mobility ; Decreased ADL status; Decreased strength;Decreased safe awareness;Decreased endurance;Decreased balance  Assessment: Pt admitted to ARU with multiple pelvic/hip fx s/p fall off ladder about 5 ft. Pt presents with above occupational performance deficits and would benefit from skilled OT to promote return to baseline, as pt was independent PTA. Prognosis: Fair  Patient Education: OT role, ADLs, transfers, WB status  REQUIRES OT FOLLOW UP: Yes  Activity Tolerance  Activity Tolerance: Patient Tolerated treatment well;Patient limited by pain  Safety Devices  Safety Devices in place: Yes  Type of devices: Bed alarm in place;Call light within reach; Left in bed;Gait belt           Patient Diagnosis(es): There were no encounter diagnoses. has a past medical history of Allergic rhinitis, Anxiety, COPD (chronic obstructive pulmonary disease) ([facility] [address] Emphysema of lung ([facility] [address] GERD (gastroesophageal reflux disease), Hypercholesteremia, Hypertension, Lumbar radiculopathy, and PTSD (post-traumatic stress disorder). has no past surgical history on file. Restrictions  Lower Extremity Weight Bearing Restrictions  Right Lower Extremity Weight Bearing: Flat Foot Weight Bearing  Left Lower Extremity Weight Bearing: Weight Bearing As Tolerated  Position Activity Restriction  Other position/activity restrictions: Toe touch weightbearing on RLE, weightbearing as tolerated on LLE.   Notify physician for pulse less than 50 or greater than 120, respiratory rate less than 12 or greater than 25,systolic BP less than 90 or greater than 528, diastolic BP less than 50 or greater than 100. Subjective   General  Chart Reviewed: Yes  Patient assessed for rehabilitation services?: Yes  Additional Pertinent Hx: anxiety, COPD, emphysema, GERD, HTN, PTSD, lumbar radiculopathy, pelvic fx, sacral fx, fracture of R hip, pubic rami fx, laryngeal CA s/p sx, RCC s/p partial nephrectomy  Referring Practitioner: Alejandrina Terrell MD  Diagnosis: fall, s/p multiple fractures  Subjective  Subjective: Pt supine, agreeable     Social/Functional History  Social/Functional History  Lives With: Son(adult son with Down Syndrome)  Type of Home: House  Home Layout: Able to Live on Main level with bedroom/bathroom, Two level  Home Access: Stairs to enter without rails  Entrance Stairs - Number of Steps: 1(4-inch step through garage entrance)  Bathroom Shower/Tub: Walk-in shower  Home Equipment: (reports no equipment)  ADL Assistance: Independent  Homemaking Assistance: Needs assistance(cleaning lady every 2 wks, independent with laundry)  Meal Prep: Other (comment)(reports eating out only, no cooking at home)  Ambulation Assistance: Independent  Active : Yes  Occupation: Retired  Type of occupation: stock broker  Additional Comments: 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 Down's Syndrome       Objective        Orientation  Overall Orientation Status: Within Functional Limits     Balance  Sitting Balance: Supervision  Standing Balance: Moderate assistance  Standing Balance  Time: <1 min x 2  Activity: ADLs  Sit to stand:  Moderate assistance  Stand to sit: Moderate assistance  Toilet Transfers  Toilet - Technique: Stand pivot  Equipment Used: Standard toilet(grab bars)  Toilet Transfer: Maximum assistance  Shower Transfers  Shower Transfers Comments: pt declines  ADL  Feeding: Independent  Grooming: Supervision  UE Bathing: Supervision  LE Bathing: Maximum assistance  UE Dressing: Minimal WB status with all transfers and mobility  Long term goals  Time Frame for Long term goals : to be met 4/25/19  Long term goal 1: Pt will complete ADLs Mod I with AE as needed  Long term goal 2: Pt will complete all fucntional transfers Mod I  Long term goal 3: Pt will complete toileting Mod I  Long term goal 4: Pt will tolerate standing at sink for grooming tasks >5 min w/o fatigue or LOB   Long term goal 5: Pt will complete light home mgmt/meal prep tasks Mod I  Patient Goals   Patient goals :                                                                                                                                                                                                                                                                                                                                                                                                                                                    Therapy Time   Individual Concurrent Group Co-treatment   Time In 1230         Time Out 1400         Minutes 90         Timed Code Treatment Minutes: Bobby Reyna OT

## 2019-04-11 NOTE — PLAN OF CARE
Patient will notify nursing staff as pain begins for assistance to prevent intense level of pain from forming.

## 2019-04-11 NOTE — CARE COORDINATION
Case Management ARU Admission Assessment     Objective:  met with the patient to complete initial assessment and review the role of Case Management while on the ARU. Patient educated on team conferences. Discussed family training with the patient/family on how it is encouraged on the unit. Order for \"discharge planning\" has been addressed. Family Present: None   Primary : Frances Millan [phone]    Admit date: 4-      Insurance: He stated VA and Medicare     Admitting diagnosis: Debility     Current home situation: Patient lives at home with his 52year old son Vaibhav Pierce who has down syndrome. He stated that he is independent with his ADL's, drives and is retired. DME at home: None     Services prior to admission: He does Outpatient Pulmonary Rehab at the [address] [facility]     Patient's goal(s): \"To get home\"    Working or Volunteering prior to admission:  __Yes x__No   He is retired                      Accessibility to community resources/transportation: He was driving         Active with: n/a  __Senior Services      __Council on Aging  __Other    Has patient experienced a recent loss or significant life event that would impact their care or ability to participate? __No  _x_Yes, please explain: Patient stated he fell off a ladder which is what brought him to the ARU    Has patient ever been treated for emotional disorder(s)? __No  x__Yes, how does this affect current situation? He is treated for PTSD and on medications. He stated that he has a Psychologist, Psychiatrist and a PCP through the [facility]. Is patient and family coping appropriately with stressful events and this hospitalization? _x_Yes  __No, please explain:    Support system at home and in the community: He stated his friend Alma Murphy is a good support     Caregiver on discharge: None.  His son has down syndrome so patient will need to be able to get around by himself, etc.     24 hour care on discharge: His son Vaibhav Pierce is home all

## 2019-04-11 NOTE — DISCHARGE INSTR - COC
Continuity of Care Form    Patient Name: Tony Jacobs   :  1945  MRN:  [id number]    Admit date:  4/10/2019  Discharge date:  2019    Code Status Order: Full Code   Advance Directives:   [facility] Documentation     Date/Time Healthcare Directive Type of Healthcare Directive Copy in [facility] [address] Agent's Name Healthcare Agent's Phone Number    04/10/19 2003  Yes, patient has an advance directive for healthcare treatment  Durable power of  for health care  No, copy requested from other (See comment)  Healthcare power of   Amari Guardian  [phone]    04/10/19 1834  Yes, patient has an advance directive for healthcare treatment  Durable power of  for health care; Health care treatment directive  No, copy requested from family  Healthcare power of   Amari Guardian   [phone]          Admitting Physician:  Opal Carter MD  PCP: No primary care provider on file. Discharging Nurse: Giulia Dwyer Unit/Room#: 9812/9691-64  Discharging Unit Phone Number: [phone]    Emergency Contact:   Extended Emergency Contact Information  Primary Emergency Contact: Haven Garrison  Mobile Phone: [phone]  Relation: Other   needed? No  Secondary Emergency Contact: Nahomy Huston  Mobile Phone: [phone]  Relation: Other   needed? No    Past Surgical History:  No past surgical history on file. Immunization History: There is no immunization history on file for this patient.     Active Problems:  Patient Active Problem List   Diagnosis Code    Debility R53.81       Isolation/Infection:   Isolation          No Isolation            Nurse Assessment:  Last Vital Signs: BP (!) 144/55   Pulse 73   Temp 98.3 °F (36.8 °C) (Oral)   Resp 18   Ht 6' (1.829 m)   Wt 174 lb 2.6 oz (79 kg)   SpO2 93%   BMI 23.62 kg/m²     Last documented pain score (0-10 scale): Pain Level: 4  Last Weight: Wt Readings from Last 1 Encounters:   04/11/19 174 lb 2.6 oz (79 kg)     Mental Status:  oriented, alert, coherent, logical and thought processes intact    IV Access:  - None    Nursing Mobility/ADLs:  Walking   Assisted  Transfer  Assisted  Bathing  Assisted  Dressing  Assisted  Toileting  Assisted  Feeding  Independent  [facility] Delivery   whole    Wound Care Documentation and Therapy:        Elimination:  Continence:   · Bowel: Yes  · Bladder: Yes  Urinary Catheter: None   Colostomy/Ileostomy/Ileal Conduit: No       Date of Last BM: 05/02/2019    No intake or output data in the 24 hours ending 04/11/19 0823  No intake/output data recorded. Safety Concerns:     History of Falls (last 30 days)    Impairments/Disabilities:      Vision    Nutrition Therapy:  Current Nutrition Therapy:   - Oral Diet:  General    Routes of Feeding: Oral  Liquids: Thin Liquids  Daily Fluid Restriction: no  Last Modified Barium Swallow with Video (Video Swallowing Test): not done    Treatments at the Time of Hospital Discharge:   Respiratory Treatments: ***  Oxygen Therapy:  is not on home oxygen therapy. Ventilator:    - No ventilator support    Rehab Therapies: Physical Therapy, Occupational Therapy and Nursing  Weight Bearing Status/Restrictions: Touchdown weight bearing (10-25 lbs) only on right leg, left no restrictions. Other Medical Equipment (for information only, NOT a DME order):   Wheelchair, Rolling Walker, Bedside Commode and Tub Transfer Bench   Other Treatments: ***    Patient's personal belongings (please select all that are sent with patient):  Juana    RN SIGNATURE:  Electronically signed by Brock Cody RN on 5/2/19 at 4:10 PM    CASE MANAGEMENT/SOCIAL WORK SECTION    Inpatient Status Date: 4- ARU    Readmission Risk Assessment Score:  Readmission Risk              Risk of Unplanned Readmission:        7           Discharging to Facility/ Agency   · Name:  [facility] · Address: [address] [place] [address]  · Phone:  [phone]  · Fax: [phone]  ·   · Bathroom DME of the Tub Transfer Bench and Bedside Commode to come to the house from the [facility]. ·   Wheelchair and Rolling walker supplied through the [facility] by Lawson Scott. Phone: [phone]  ·     / signature: Electronically signed by SAÚL Peoples, ANAW on 5/2/19 at 10:18 AM    PHYSICIAN SECTION    Prognosis: Good    Condition at Discharge: Stable    Rehab Potential (if transferring to Rehab): Good    Recommended Labs or Other Treatments After Discharge:     Recommended Follow-up, Labs or Other Treatments After Discharge:    Home care PT, OT, Nurse  Follow-up with PCP and Ortho (Dr. Erin Torres)  Continue toe touch weight bearing on the right lower extremity until cleared by Ortho. No driving until cleared by a physician. Physician Certification: I certify the above information and transfer of Kevin Baker  is necessary for the continuing treatment of the diagnosis listed and that he requires [address] for less 30 days.      Update Admission H&P: No change in H&P    PHYSICIAN SIGNATURE:  Electronically signed by Enoc Melgar MD on 5/2/19 at 10:31 AM

## 2019-04-12 PROCEDURE — 97535 SELF CARE MNGMENT TRAINING: CPT

## 2019-04-12 PROCEDURE — 6370000000 HC RX 637 (ALT 250 FOR IP): Performed by: PHYSICAL MEDICINE & REHABILITATION

## 2019-04-12 PROCEDURE — 97530 THERAPEUTIC ACTIVITIES: CPT

## 2019-04-12 PROCEDURE — 94640 AIRWAY INHALATION TREATMENT: CPT

## 2019-04-12 PROCEDURE — 97110 THERAPEUTIC EXERCISES: CPT

## 2019-04-12 PROCEDURE — 6360000002 HC RX W HCPCS: Performed by: PHYSICAL MEDICINE & REHABILITATION

## 2019-04-12 PROCEDURE — 97116 GAIT TRAINING THERAPY: CPT

## 2019-04-12 PROCEDURE — 1280000000 HC REHAB R&B

## 2019-04-12 RX ADMIN — BACLOFEN 10 MG: 10 TABLET ORAL at 21:20

## 2019-04-12 RX ADMIN — AMLODIPINE BESYLATE 5 MG: 5 TABLET ORAL at 08:10

## 2019-04-12 RX ADMIN — Medication 2 PUFF: at 21:07

## 2019-04-12 RX ADMIN — PANTOPRAZOLE SODIUM 40 MG: 40 TABLET, DELAYED RELEASE ORAL at 06:42

## 2019-04-12 RX ADMIN — VITAMIN D, TAB 1000IU (100/BT) 1000 UNITS: 25 TAB at 08:10

## 2019-04-12 RX ADMIN — PRAVASTATIN SODIUM 40 MG: 40 TABLET ORAL at 21:19

## 2019-04-12 RX ADMIN — ENOXAPARIN SODIUM 40 MG: 40 INJECTION SUBCUTANEOUS at 08:12

## 2019-04-12 RX ADMIN — GUAIFENESIN 600 MG: 600 TABLET, EXTENDED RELEASE ORAL at 08:10

## 2019-04-12 RX ADMIN — BACLOFEN 10 MG: 10 TABLET ORAL at 08:10

## 2019-04-12 RX ADMIN — OXYCODONE HYDROCHLORIDE 10 MG: 5 TABLET ORAL at 12:34

## 2019-04-12 RX ADMIN — SENNOSIDES AND DOCUSATE SODIUM 1 TABLET: 8.6; 5 TABLET ORAL at 08:10

## 2019-04-12 RX ADMIN — TAMSULOSIN HYDROCHLORIDE 0.4 MG: 0.4 CAPSULE ORAL at 08:10

## 2019-04-12 RX ADMIN — BACLOFEN 10 MG: 10 TABLET ORAL at 13:58

## 2019-04-12 RX ADMIN — GABAPENTIN 1200 MG: 300 CAPSULE ORAL at 08:10

## 2019-04-12 RX ADMIN — SENNOSIDES AND DOCUSATE SODIUM 1 TABLET: 8.6; 5 TABLET ORAL at 21:20

## 2019-04-12 RX ADMIN — DIPHENHYDRAMINE HCL 25 MG: 25 TABLET ORAL at 21:20

## 2019-04-12 RX ADMIN — OXYCODONE HYDROCHLORIDE 10 MG: 5 TABLET ORAL at 08:10

## 2019-04-12 RX ADMIN — GUAIFENESIN 600 MG: 600 TABLET, EXTENDED RELEASE ORAL at 18:05

## 2019-04-12 RX ADMIN — OXYCODONE HYDROCHLORIDE 10 MG: 5 TABLET ORAL at 21:26

## 2019-04-12 RX ADMIN — GABAPENTIN 1200 MG: 300 CAPSULE ORAL at 13:58

## 2019-04-12 RX ADMIN — ASPIRIN 81 MG 81 MG: 81 TABLET ORAL at 08:10

## 2019-04-12 RX ADMIN — LORAZEPAM 1 MG: 1 TABLET ORAL at 21:19

## 2019-04-12 RX ADMIN — GABAPENTIN 1200 MG: 300 CAPSULE ORAL at 21:19

## 2019-04-12 RX ADMIN — MAGNESIUM GLUCONATE 500 MG ORAL TABLET 400 MG: 500 TABLET ORAL at 11:12

## 2019-04-12 ASSESSMENT — PAIN DESCRIPTION - FREQUENCY
FREQUENCY: CONTINUOUS

## 2019-04-12 ASSESSMENT — PAIN DESCRIPTION - ONSET
ONSET: ON-GOING

## 2019-04-12 ASSESSMENT — PAIN SCALES - GENERAL
PAINLEVEL_OUTOF10: 8
PAINLEVEL_OUTOF10: 8
PAINLEVEL_OUTOF10: 0
PAINLEVEL_OUTOF10: 8

## 2019-04-12 ASSESSMENT — PAIN DESCRIPTION - PAIN TYPE
TYPE: ACUTE PAIN

## 2019-04-12 ASSESSMENT — PAIN DESCRIPTION - ORIENTATION
ORIENTATION: RIGHT

## 2019-04-12 ASSESSMENT — PAIN DESCRIPTION - LOCATION
LOCATION: HIP;GROIN
LOCATION: GROIN
LOCATION: HIP;GROIN
LOCATION: HIP;GROIN

## 2019-04-12 ASSESSMENT — PAIN - FUNCTIONAL ASSESSMENT
PAIN_FUNCTIONAL_ASSESSMENT: PREVENTS OR INTERFERES SOME ACTIVE ACTIVITIES AND ADLS
PAIN_FUNCTIONAL_ASSESSMENT: PREVENTS OR INTERFERES SOME ACTIVE ACTIVITIES AND ADLS
PAIN_FUNCTIONAL_ASSESSMENT: PREVENTS OR INTERFERES WITH ALL ACTIVE AND SOME PASSIVE ACTIVITIES
PAIN_FUNCTIONAL_ASSESSMENT: PREVENTS OR INTERFERES SOME ACTIVE ACTIVITIES AND ADLS

## 2019-04-12 ASSESSMENT — PAIN DESCRIPTION - DESCRIPTORS
DESCRIPTORS: ACHING;CONSTANT
DESCRIPTORS: ACHING;CONSTANT
DESCRIPTORS: RADIATING
DESCRIPTORS: RADIATING

## 2019-04-12 ASSESSMENT — PAIN DESCRIPTION - PROGRESSION
CLINICAL_PROGRESSION: NOT CHANGED

## 2019-04-12 NOTE — CARE COORDINATION
Spoke with Phylicia Carmen at [employer] who stated patient is 100% service connected. Anything patient needs will need to be arranged through the [place]. His PCP is Dr. Jazmín Jackson and phone number is [phone].  His   Care Manager is Gladis Bustamante and can be reached at [address] MSW, LSW

## 2019-04-12 NOTE — PROGRESS NOTES
Physical Therapy  Facility/Department: [facility] ARU  Daily Treatment Note  NAME: Maday Armenta  : 1945  MRN: [id number]    Date of Service: 2019    Discharge Recommendations:  Continue to assess pending progress   PT Equipment Recommendations  Equipment Needed: Yes  Mobility Devices: Wheelchair  Walker: Rolling  Wheelchair: Standard(rental 18 inch wide. )  Other: will continue to assess    Patient Diagnosis(es): There were no encounter diagnoses. has a past medical history of Allergic rhinitis, Anxiety, COPD (chronic obstructive pulmonary disease) ([facility] [address] Emphysema of lung ([facility] [address] GERD (gastroesophageal reflux disease), Hypercholesteremia, Hypertension, Lumbar radiculopathy, and PTSD (post-traumatic stress disorder). has no past surgical history on file. Restrictions  Lower Extremity Weight Bearing Restrictions  Right Lower Extremity Weight Bearing: Flat Foot Weight Bearing  Left Lower Extremity Weight Bearing: Weight Bearing As Tolerated  Position Activity Restriction  Other position/activity restrictions: Toe touch weightbearing on RLE, weightbearing as tolerated on LLE. Notify physician for pulse less than 50 or greater than 120, respiratory rate less than 12 or greater than 25,systolic BP less than 90 or greater than 699, diastolic BP less than 50 or greater than 100.   Subjective      Pain Assessment  Pain Assessment: 0-10  Pain Level: 8  Pain Type: Acute pain  Pain Location: Hip;Groin  Pain Orientation: Right  Pain Descriptors: Radiating  Pain Frequency: Continuous  Pain Onset: On-going  Clinical Progression: Not changed  Functional Pain Assessment: Prevents or interferes some active activities and ADLs  Non-Pharmaceutical Pain Intervention(s): Emotional support;Repositioned  Vital Signs  Pulse: 80  BP: (!) 148/70  BP Location: Left upper arm  Patient Position: Semi fowlers  Oxygen Therapy  SpO2: 93 %  Pulse Oximeter Device Mode: Intermittent  Pulse Oximeter Device Location: Right;Finger  O2 Device: None (Room air)  Patient Observation  Observations: Pt alert and conversational        Orientation: to person, place, date. Cognition follows 2 step commands, Pt demo need for cues with mobility and extra time greater than 3 x noirmal to transfer took greater than 15 minutes to transfer WC>bed pt noted to be concerned about his sons and talking about it frequently during session with need for redirection. PT at times appears overwhelmed by LE pain and needs frequent rest between activities. PT cooperative throughout session. Objective   Bed mobility  Supine to Sit: Minimal assistance(with HOBE and assist to move LLE only with pt emplopying use of rail )  Scooting: Independent  Transfers  Sit to Stand: Moderate Assistance  Stand to sit: Moderate Assistance  Bed to Chair: Moderate assistance;Maximum assistance(Mod sit to stand and mod>max stand to sit ) BED>WC>BED    Lateral Transfers: Minimal Assistance;Stand by assistance(slide trnasfer WC>mat min assist x1 with poor balance control WB on LLE and BUE with pt struggling with RLE control, bed<>WC slide board transfer with set up assist for WC and board SBA and cues for technique wtih swing back arm rest. )  Comment: RLE foot flat TDWB only with pt emplouying heavy WB on UE .  needed cues to postiion RLE for sit<>stand as to avoid acetabular stress and needed cues to reach back and control desecent from stand to sit at [facility] and at toilet. PT performed  transfer WC<>toilet with max assist to sit to toilet and mod sit<>Stand from [facility]  and mod toilet to stand with Bilateral   Pt coughing and expectorating profusely intermittently during session with thick green to yellow sputumnoted   Ambulation  Ambulation?: Yes  WB Status: Foot flat touch down weight bearing RLE, WBAT LLE  More Ambulation?: No  Ambulation 1  Surface: level tile  Device: Rolling Walker  Assistance:  Moderate assistance  Quality of Gait: Decreased stance on RLE, decreased step length LLE, antalgic, step-to pattern, slow marlena. Distance: 3 feet  Propulsion 1  Propulsion: Manual  Level: Carpet  Method: RUE;LUE  Level of Assistance: Modified independent  Description/ Details: left right turns and over carpet   Distance: 150 feetx2, 200feet. Exercises  Heelslides: x30 LLE partial range as comfort determines  Ankle Pumps: resisted DF/PF red band x30 LLE   Other exercises  Other exercises 1: sit<> parallel bars mod assist to stand and sit in bars wit hLeft knee blocked and facilitated weight shift to the left  Other exercises 2: in parallel bars LLE forward step  with touch down WB only RLE foot flat LLE FWB x10          Assessment     Patient  seen for session with gait up to 3 feet with pt with difficulty tolerating standing due to  LE pain, needed frequent cues for LE restrictions RLE Touchdown WB only with foot flat and LLE WB and BUE WB. Needed cues with all sit<>Stand for appropriate RLE position to avoid acetabulum stress. PT needed set up and supervision assist with slide board transfer and up to max assist with other transfers but decreased assist with bed mobility with rail and RLE lift assist (min). Pt tolerated LE therex and walking with RW poorly due to pain. PT Mandy with WC propulsion. Continue to progress. Body structures, Functions, Activity limitations: Decreased functional mobility ; Decreased strength;Decreased endurance;Decreased safe awareness;Decreased ROM; Decreased balance; Increased Pain  Treatment Diagnosis: Debility  Patient Education: Role of PT, safety awareness, AD management, gait training, exercises  REQUIRES PT FOLLOW UP: Yes                  Goals  Short term goals  Time Frame for Short term goals: To be met by 4/18/19  Short term goal 1: Pt to perform supine to/from sit transfers at flat bed without rails with supervision. Short term goal 2: Pt to perform sit to/from stand at [facility] with CGA.   Short term goal 3: Pt to ambulate 50 ft with LRAD with CGA for household mobility. Short term goal 4: Pt to propel wheelchair 50 ft on level surface with mod I for household mobility. GOAL MET 4/12/2019 Patient demo 205 feet SYL WC propulsion level and carpet with left/.right tuns and thru doorways  Short term goal 5: Pt to demonstrate car transfer with RW with mod A x 1. Long term goals  Time Frame for Long term goals : To be met by 4/25/19  Long term goal 1: Pt to perform supine to/from sit transfers at flat bed without rails with mod I.  Long term goal 2: Pt to perform sit to/from stand at [facility] with mod I.  Long term goal 3: Pt to ambulate 150 ft with LRAD with mod I for community mobility. Long term goal 4: Pt to manage 1 curb step with wheelchair or LRAD with mod I for home entry. Long term goal 5: Pt to demonstrate car transfer with RW with min A x 1. Patient Goals   Patient goals : \"to be home and able to drive and do what I was doing\"    Plan    Plan  Times per week: 5/7 days week   Times per day: Daily  Current Treatment Recommendations: Strengthening, Functional Mobility Training, Wheelchair Mobility Training, Neuromuscular Re-education, Home Exercise Program, Equipment Evaluation, Education, & procurement, Safety Education & Training, Gait Training, Transfer Training, ROM, Balance Training, Endurance Training, Stair training, Pain Management, Patient/Caregiver Education & Training, Positioning  Safety Devices  Type of devices:  All fall risk precautions in place, Bed alarm in place, Left in bed, Call light within reach  Restraints  Initially in place: No     Therapy Time   Individual Concurrent Group Co-treatment   Time In 0740         Time Out 0910         Minutes 90         Timed Code Treatment Minutes: [address] Fabio Love, PT

## 2019-04-12 NOTE — PROGRESS NOTES
colace=senna BID, PRN MoM. follow bowel movements.  Enema or suppository if needed.      Pain control - Gabapentin (for chronic neuropathy), baclofen, prn oxycodone     DVT PPx - lovenox       German Degroot MD, 4/12/2019, 9:55 AM pulses full and equal bilaterally

## 2019-04-12 NOTE — PROGRESS NOTES
Occupational Therapy  Facility/Department: [facility] ARU  Daily Treatment Note  NAME: Nadir Gonzalez  : 1945  MRN: [id number]    Date of Service: 2019    Discharge Recommendations:  Home with Home health OT, Home with assist PRN  OT Equipment Recommendations  Other: will likely need shower chair, possible RTS as needed    Assessment   Performance deficits / Impairments: Decreased functional mobility ; Decreased ADL status; Decreased strength;Decreased safe awareness;Decreased endurance;Decreased balance  Assessment: Pt cooperative and pleasant with encouragement. Pt very talkative and requires frequent redirection to task. Pt participates in B UE ex with red theraband. Pt able to shave with electric clippers with significantly increased time to complete as pt is easily distracted and talkative. Cont POC. Prognosis: Fair  Patient Education: OT role, ADLs, transfers, WB status  REQUIRES OT FOLLOW UP: Yes  Activity Tolerance  Activity Tolerance: Patient Tolerated treatment well;Patient limited by pain  Safety Devices  Safety Devices in place: Yes  Type of devices: Bed alarm in place;Call light within reach; Left in bed;Gait belt         Patient Diagnosis(es): There were no encounter diagnoses. has a past medical history of Allergic rhinitis, Anxiety, COPD (chronic obstructive pulmonary disease) ([address] Emphysema of lung ([address] GERD (gastroesophageal reflux disease), Hypercholesteremia, Hypertension, Lumbar radiculopathy, and PTSD (post-traumatic stress disorder). has no past surgical history on file. Restrictions  Lower Extremity Weight Bearing Restrictions  Right Lower Extremity Weight Bearing: Flat Foot Weight Bearing  Left Lower Extremity Weight Bearing: Weight Bearing As Tolerated  Position Activity Restriction  Other position/activity restrictions: flat foot Toe touch weightbearing on RLE, weightbearing as tolerated on LLE.   Notify physician for pulse less than 50 or greater than 120, respiratory rate errors made  Insights: Decreased awareness of deficits  Initiation: Requires cues for some  Sequencing: Requires cues for some  Cognition Comment: pt very talkative, needs redirection                    Type of ROM/Therapeutic Exercise  Type of ROM/Therapeutic Exercise: AROM  Comment: red theraband  Exercises  Scapular Protraction: 2 sets x15  Scapular Retraction: 2 sets x15  Shoulder Flexion: 2 sets x15  Shoulder ABduction: 2 sets x15  Shoulder ADduction: 2 sets x15  Horizontal ABduction: 2 sets x15  Horizontal ADduction: 2 sets x15  Elbow Flexion: 2 sets x15  Elbow Extension: 2 sets x15                    Plan   Plan  Times per week: 5/7 days/wk  Current Treatment Recommendations: Strengthening, Balance Training, Functional Mobility Training, Endurance Training, Patient/Caregiver Education & Training, Equipment Evaluation, Education, & procurement, Self-Care / ADL    Goals  Short term goals  Time Frame for Short term goals: to be met by 4/18/19  Short term goal 1: Pt will complete LB ADLs with min A with AE as needed  Short term goal 2: Pt will complete functional transfers with Min A  Short term goal 3: Pt will complete toileting with min A or less  Short term goal 4: pt will adhere to WB status with all transfers and mobility  Long term goals  Time Frame for Long term goals : to be met 4/25/19  Long term goal 1: Pt will complete ADLs Mod I with AE as needed  Long term goal 2: Pt will complete all fucntional transfers Mod I  Long term goal 3: Pt will complete toileting Mod I  Long term goal 4: Pt will tolerate standing at sink for grooming tasks >5 min w/o fatigue or LOB   Long term goal 5: Pt will complete light home mgmt/meal prep tasks Mod I  Patient Goals   Patient goals : \"go home, take care of myself\"         Therapy Time   Individual Concurrent Group Co-treatment   Time In 0930         Time Out 1000         Minutes 30         Timed Code Treatment Minutes: 30 Minutes    Therapy Time   Individual Concurrent Group Co-treatment   Time In 1030         Time Out 1140         Minutes 70         Timed Code Treatment Minutes: [address], OT

## 2019-04-13 PROCEDURE — 6370000000 HC RX 637 (ALT 250 FOR IP): Performed by: PHYSICAL MEDICINE & REHABILITATION

## 2019-04-13 PROCEDURE — 6360000002 HC RX W HCPCS: Performed by: PHYSICAL MEDICINE & REHABILITATION

## 2019-04-13 PROCEDURE — 97110 THERAPEUTIC EXERCISES: CPT

## 2019-04-13 PROCEDURE — 1280000000 HC REHAB R&B

## 2019-04-13 PROCEDURE — 97530 THERAPEUTIC ACTIVITIES: CPT

## 2019-04-13 PROCEDURE — 97535 SELF CARE MNGMENT TRAINING: CPT

## 2019-04-13 PROCEDURE — 97116 GAIT TRAINING THERAPY: CPT

## 2019-04-13 PROCEDURE — 94640 AIRWAY INHALATION TREATMENT: CPT

## 2019-04-13 RX ADMIN — ASPIRIN 81 MG 81 MG: 81 TABLET ORAL at 08:08

## 2019-04-13 RX ADMIN — SENNOSIDES AND DOCUSATE SODIUM 1 TABLET: 8.6; 5 TABLET ORAL at 20:55

## 2019-04-13 RX ADMIN — BACLOFEN 10 MG: 10 TABLET ORAL at 20:55

## 2019-04-13 RX ADMIN — Medication 2 PUFF: at 08:16

## 2019-04-13 RX ADMIN — OXYCODONE HYDROCHLORIDE 10 MG: 5 TABLET ORAL at 14:45

## 2019-04-13 RX ADMIN — PRAVASTATIN SODIUM 40 MG: 40 TABLET ORAL at 20:55

## 2019-04-13 RX ADMIN — GABAPENTIN 1200 MG: 300 CAPSULE ORAL at 08:10

## 2019-04-13 RX ADMIN — BACLOFEN 10 MG: 10 TABLET ORAL at 14:45

## 2019-04-13 RX ADMIN — GABAPENTIN 1200 MG: 300 CAPSULE ORAL at 20:55

## 2019-04-13 RX ADMIN — TAMSULOSIN HYDROCHLORIDE 0.4 MG: 0.4 CAPSULE ORAL at 08:08

## 2019-04-13 RX ADMIN — Medication 2 PUFF: at 20:41

## 2019-04-13 RX ADMIN — OXYCODONE HYDROCHLORIDE 10 MG: 5 TABLET ORAL at 20:55

## 2019-04-13 RX ADMIN — LORAZEPAM 1 MG: 1 TABLET ORAL at 20:56

## 2019-04-13 RX ADMIN — SENNOSIDES AND DOCUSATE SODIUM 1 TABLET: 8.6; 5 TABLET ORAL at 08:08

## 2019-04-13 RX ADMIN — MAGNESIUM GLUCONATE 500 MG ORAL TABLET 400 MG: 500 TABLET ORAL at 08:08

## 2019-04-13 RX ADMIN — ENOXAPARIN SODIUM 40 MG: 40 INJECTION SUBCUTANEOUS at 08:08

## 2019-04-13 RX ADMIN — GABAPENTIN 1200 MG: 300 CAPSULE ORAL at 14:45

## 2019-04-13 RX ADMIN — VITAMIN D, TAB 1000IU (100/BT) 1000 UNITS: 25 TAB at 08:08

## 2019-04-13 RX ADMIN — GUAIFENESIN 600 MG: 600 TABLET, EXTENDED RELEASE ORAL at 08:08

## 2019-04-13 RX ADMIN — GUAIFENESIN 600 MG: 600 TABLET, EXTENDED RELEASE ORAL at 16:47

## 2019-04-13 RX ADMIN — TIOTROPIUM BROMIDE 18 MCG: 18 CAPSULE ORAL; RESPIRATORY (INHALATION) at 08:16

## 2019-04-13 RX ADMIN — OXYCODONE HYDROCHLORIDE 10 MG: 5 TABLET ORAL at 08:07

## 2019-04-13 RX ADMIN — DIPHENHYDRAMINE HCL 25 MG: 25 TABLET ORAL at 20:55

## 2019-04-13 RX ADMIN — AMLODIPINE BESYLATE 5 MG: 5 TABLET ORAL at 08:08

## 2019-04-13 RX ADMIN — BACLOFEN 10 MG: 10 TABLET ORAL at 08:08

## 2019-04-13 RX ADMIN — PANTOPRAZOLE SODIUM 40 MG: 40 TABLET, DELAYED RELEASE ORAL at 06:52

## 2019-04-13 ASSESSMENT — PAIN DESCRIPTION - LOCATION
LOCATION: GROIN;HIP
LOCATION: HIP;GROIN
LOCATION: HIP

## 2019-04-13 ASSESSMENT — PAIN DESCRIPTION - PAIN TYPE
TYPE: ACUTE PAIN

## 2019-04-13 ASSESSMENT — PAIN SCALES - GENERAL
PAINLEVEL_OUTOF10: 7
PAINLEVEL_OUTOF10: 8
PAINLEVEL_OUTOF10: 7
PAINLEVEL_OUTOF10: 8

## 2019-04-13 ASSESSMENT — PAIN DESCRIPTION - DESCRIPTORS: DESCRIPTORS: ACHING;CONSTANT

## 2019-04-13 ASSESSMENT — PAIN DESCRIPTION - FREQUENCY: FREQUENCY: CONTINUOUS

## 2019-04-13 ASSESSMENT — PAIN DESCRIPTION - ORIENTATION
ORIENTATION: RIGHT
ORIENTATION: RIGHT

## 2019-04-13 ASSESSMENT — PAIN - FUNCTIONAL ASSESSMENT: PAIN_FUNCTIONAL_ASSESSMENT: ACTIVITIES ARE NOT PREVENTED

## 2019-04-13 ASSESSMENT — PAIN DESCRIPTION - ONSET: ONSET: ON-GOING

## 2019-04-13 ASSESSMENT — PAIN DESCRIPTION - PROGRESSION
CLINICAL_PROGRESSION: NOT CHANGED
CLINICAL_PROGRESSION: NOT CHANGED

## 2019-04-13 NOTE — PROGRESS NOTES
Occupational Therapy  [facility]  Daily [facility] Note  NAME: Farooq Younger  : 1945  MRN: [id number]    Date of Service: 2019    Discharge Recommendations:  Home with Home health OT, Home with assist PRN  OT Equipment Recommendations  Other: will likely need shower chair, possible RTS as needed    Assessment   Performance deficits / Impairments: Decreased functional mobility ; Decreased ADL status; Decreased strength;Decreased safe awareness;Decreased endurance;Decreased balance  Assessment: Patient pleasant and cooperative throughout session, talkative and requires frequent redirection to task. Patient sat to complete grooming tasks-shaving with blade, brush teeth. Sat to complete BUE ex with 3# free weight. Cont OT POC. Prognosis: Good  Patient Education: OT role, ADLs, transfers, WB status  REQUIRES OT FOLLOW UP: Yes  Activity Tolerance  Activity Tolerance: Patient Tolerated treatment well;Patient limited by pain  Safety Devices  Safety Devices in place: Yes  Type of devices: Bed alarm in place;Call light within reach; Left in bed;Gait belt         Patient Diagnosis(es): There were no encounter diagnoses. has a past medical history of Allergic rhinitis, Anxiety, COPD (chronic obstructive pulmonary disease) ([facility] [address] Emphysema of lung ([facility] [address] GERD (gastroesophageal reflux disease), Hypercholesteremia, Hypertension, Lumbar radiculopathy, and PTSD (post-traumatic stress disorder). has no past surgical history on file. Restrictions  Lower Extremity Weight Bearing Restrictions  Right Lower Extremity Weight Bearing: Flat Foot Weight Bearing  Left Lower Extremity Weight Bearing: Weight Bearing As Tolerated  Position Activity Restriction  Other position/activity restrictions: flat foot Toe touch weightbearing on RLE, weightbearing as tolerated on LLE.   Notify physician for pulse less than 50 or greater than 120, respiratory rate less than 12 or greater than 25,systolic BP less than 90 or greater than 306, diastolic BP less than 50 or greater than 100. Subjective   General  Chart Reviewed: Yes  Patient assessed for rehabilitation services?: Yes  Additional Pertinent Hx: anxiety, COPD, emphysema, GERD, HTN, PTSD, lumbar radiculopathy, pelvic fx, sacral fx, fracture of R hip, pubic rami fx, laryngeal CA s/p sx, RCC s/p partial nephrectomy  Family / Caregiver Present: No  Referring Practitioner: Keysha Sanford MD  Diagnosis: fall, s/p multiple fractures  Subjective  Subjective: Patient up in wheelchair upon arrival, agreeable to OT intervention  General Comment  Comments: RN cleared for treatment  Pain Assessment  Clinical Progression: Not changed  Vital Signs  Patient Currently in Pain: Yes   Orientation  Orientation  Overall Orientation Status: Within Functional Limits  Objective    ADL  Grooming: Supervision;Setup(shave, brush teeth)  Toileting: Moderate assistance(clothing management-pull up pants after falling to ground)        Balance  Sitting Balance: Supervision  Standing Balance: Moderate assistance  Standing Balance  Time: <1 min x 2  Activity: toilet transfer<->w/c  Sit to stand: Minimal assistance  Stand to sit: Minimal assistance  Comment: grab bars  Functional Mobility  Functional - Mobility Device: Other  Toilet Transfers  Toilet - Technique: Stand pivot  Equipment Used: Standard toilet(with grab bars)  Toilet Transfer: Minimal assistance  Bed mobility  Sit to Supine: Minimal assistance(assist RLE)  Scooting: Independent  Transfers  Stand Pivot Transfers: Minimal assistance(w/c->EOB)  Sit to stand: Minimal assistance  Stand to sit: Minimal assistance  Transfer Comments: cues for hand placement and anterior weight shift in prep for standing                       Cognition  Overall Cognitive Status: Exceptions  Arousal/Alertness: Appropriate responses to stimuli  Following Commands: Follows one step commands with repetition; Follows one step commands with increased time  Attention Span: Attends with cues to redirect  Memory: Decreased short term memory  Safety Judgement: Decreased awareness of need for assistance  Problem Solving: Assistance required to identify errors made  Insights: Decreased awareness of deficits  Initiation: Requires cues for some  Sequencing: Requires cues for some  Cognition Comment: pt very talkative, needs redirection throughout session, able to focus while shaving with blade razor                    Type of ROM/Therapeutic Exercise  Type of ROM/Therapeutic Exercise: AROM; Free weights  Comment: 3#  Exercises  Shoulder Flexion: 2 sets x15  Shoulder ABduction: 2 sets x15  Shoulder ADduction: 2 sets x15  Horizontal ABduction: 2 sets x15  Horizontal ADduction: 2 sets x15  Elbow Flexion: 2 sets x15  Elbow Extension: 2 sets x15  Supination: 2 sets x15  Pronation: 2 sets x15  Wrist Flexion: 2 sets x15  Wrist Extension: 2 sets x15                    Plan   Plan  Times per week: 5/7 days/wk  Current Treatment Recommendations: Strengthening, Balance Training, Functional Mobility Training, Endurance Training, Patient/Caregiver Education & Training, Equipment Evaluation, Education, & procurement, Self-Care / ADL    Goals  Short term goals  Time Frame for Short term goals: to be met by 4/18/19  Short term goal 1: Pt will complete LB ADLs with min A with AE as needed  Short term goal 2: Pt will complete functional transfers with Min A  Short term goal 3: Pt will complete toileting with min A or less  Short term goal 4: pt will adhere to WB status with all transfers and mobility  Long term goals  Time Frame for Long term goals : to be met 4/25/19  Long term goal 1: Pt will complete ADLs Mod I with AE as needed  Long term goal 2: Pt will complete all fucntional transfers Mod I  Long term goal 3: Pt will complete toileting Mod I  Long term goal 4: Pt will tolerate standing at sink for grooming tasks >5 min w/o fatigue or LOB   Long term goal 5: Pt will complete light home mgmt/meal prep tasks Mod I  Patient Goals   Patient goals : \"go home, take care of myself\"       Therapy Time   Individual Concurrent Group Co-treatment   Time In 1300         Time Out 1430         Minutes 90         Timed Code Treatment Minutes: 80 Minutes       Billy Crawley, OT

## 2019-04-13 NOTE — PLAN OF CARE
Patient able to use pain rating scale 0/10 adequately without problems. Pain medications explained along with frequency and when to notify the nurse with  possible side effects. Verbalizes understanding. Call light within reach. Resting quietly in bed. Denies needs at present.

## 2019-04-13 NOTE — PROGRESS NOTES
Physical Therapy  Facility/Department: [facility]  Daily Treatment Note  NAME: Destinee Gibbs  : 1945  MRN: [id number]    Date of Service: 2019    Discharge Recommendations:  Continue to assess pending progress   PT Equipment Recommendations  Equipment Needed: Yes  Walker: Rolling  Wheelchair: Standard  Other: will continue to assess    Patient Diagnosis(es): There were no encounter diagnoses. has a past medical history of Allergic rhinitis, Anxiety, COPD (chronic obstructive pulmonary disease) ([facility] [address] Emphysema of lung ([facility] [address] GERD (gastroesophageal reflux disease), Hypercholesteremia, Hypertension, Lumbar radiculopathy, and PTSD (post-traumatic stress disorder). has no past surgical history on file. Restrictions  Lower Extremity Weight Bearing Restrictions  Right Lower Extremity Weight Bearing: Flat Foot Weight Bearing  Left Lower Extremity Weight Bearing: Weight Bearing As Tolerated  Position Activity Restriction  Other position/activity restrictions: flat foot Toe touch weightbearing on RLE, weightbearing as tolerated on LLE. Notify physician for pulse less than 50 or greater than 120, respiratory rate less than 12 or greater than 25,systolic BP less than 90 or greater than 492, diastolic BP less than 50 or greater than 100. Subjective   General  Additional Pertinent Hx: s/p fall resulting in pelvic and sacral fractures (non-operative treatment)  Subjective  Subjective: Pt agreeable to treatment; c/o 8/10 hip pain. Pain Screening  Patient Currently in Pain: Yes  Pain Assessment  Pain Assessment: 0-10  Pain Level: 8  Pain Type: Acute pain  Pain Location: Hip;Groin  Pain Orientation: Right  Vital Signs  Patient Currently in Pain: Yes  Orientation  Orientation  Overall Orientation Status: Within Normal Limits  Objective   Bed mobility  Supine to Sit: SBA   Scooting: Independent  Transfers  Sit to Stand:  Moderate Assistance from bedside with RW; min assist from commode with use of grab bars; CGA when using // bars. Stand to sit: Moderate Assistance  Ambulation  WB Status: Foot flat touch down weight bearing RLE, WBAT LLE  Ambulation 1  Surface: level tile  Device: Rolling Walker  Assistance: Moderate assistance  Quality of Gait: Decreased stance on RLE, decreased step length LLE, antalgic, step-to pattern, slow marlena, unsteady   Distance: 2x12 feet  Comments: Spo2 at 90% on RA after activity. Wheelchair Activities  Wheelchair Parts Management: Yes  Left Leg Rest Level of Assistance: supervision with cues   Right Leg Rest Level of Assistance: supervision with cues   Left Brakes Level of Assistance: Supervision  Right Brakes Level of Assistance: Supervision  Propulsion: Yes  Propulsion 1  Propulsion: Manual  Level: Carpet  Method: RUE;LUE  Level of Assistance: Modified independent  Distance: 2x 200feet. Balance  Sitting - Static: Fair  Sitting - Dynamic: Fair;+  Standing - Static: Poor;-  Standing - Dynamic: Poor;-  Exercises  Heelslides: x6 RLE sitting in chair   Gluteal Sets: 15x5 sec  sitting  Ankle Pumps: x20 B   LAQ: 6x5 sec RLE (limited by pain). M/L weight shift with UUE support x5 to perform chilo-care with left UE to include arm extension and crossing midline. Other exercises 1: Repetitive sit<>stand transfers in // bars x2 (limited due to pain). Other exercises 2: in parallel bars LLE forward step HOLD with touch down WB only RLE foot flat LLE FWB: 3x30\"-90\"   Assessment   Assessment: Pt was limited due to pain, generalized deconditioning, needing to use the restroom and needing pain meds. Pt is pleasant and willing to work hard but can be very talkative. Bed mobility is very effortful and painful with the HOB elevated and use of the handrails to get EOB. Transfers and gait training were unsteady and unsafe; requiring intermittent assistance for balance. Pt maintains WBing restrictions throughout functional mobility.  Presents with poor tolerance for prolonged standing requiring extensive sitting rest breaks throughout treatment session for pain management. Treatment Diagnosis: Debility  Prognosis: Fair;Good  Patient Education: Role of PT, safety awareness, AD management, gait training, exercises  REQUIRES PT FOLLOW UP: Yes     Goals  Short term goals  Time Frame for Short term goals: To be met by 4/18/19  Short term goal 1: Pt to perform supine to/from sit transfers at flat bed without rails with supervision. Short term goal 2: Pt to perform sit to/from stand at [facility] with CGA. Short term goal 3: Pt to ambulate 50 ft with LRAD with CGA for household mobility. Short term goal 4: Pt to propel wheelchair 50 ft on level surface with mod I for household mobility. GOAL MET 4/12/2019 Patient demo 205 feet SYL WC propulsion level and carpet with left/.right tuns and thru doorways  Short term goal 5: Pt to demonstrate car transfer with RW with mod A x 1. Long term goals  Time Frame for Long term goals : To be met by 4/25/19  Long term goal 1: Pt to perform supine to/from sit transfers at flat bed without rails with mod I.  Long term goal 2: Pt to perform sit to/from stand at [facility] with mod I.  Long term goal 3: Pt to ambulate 150 ft with LRAD with mod I for community mobility. Long term goal 4: Pt to manage 1 curb step with wheelchair or LRAD with mod I for home entry. Long term goal 5: Pt to demonstrate car transfer with RW with min A x 1.   Patient Goals   Patient goals : \"to be home and able to drive and do what I was doing\"    Plan    Plan  Times per week: 5/7 days week   Times per day: Daily  Current Treatment Recommendations: Strengthening, Functional Mobility Training, Wheelchair Mobility Training, Neuromuscular Re-education, Home Exercise Program, Equipment Evaluation, Education, & procurement, Safety Education & Training, Gait Training, Transfer Training, ROM, Balance Training, Endurance Training, Stair training, Pain Management, Patient/Caregiver Education & Training, Positioning  Safety Devices  Type of devices:  All fall risk precautions in place, Chair alarm in place, Left in chair, Call light within reach  Restraints  Initially in place: No     Therapy Time   Individual Concurrent Group Co-treatment   Time In 0730         Time Out 0900         Minutes 90         Timed Code Treatment Minutes: Obey Ji 53, PTA

## 2019-04-14 PROCEDURE — 6370000000 HC RX 637 (ALT 250 FOR IP): Performed by: PHYSICAL MEDICINE & REHABILITATION

## 2019-04-14 PROCEDURE — 94640 AIRWAY INHALATION TREATMENT: CPT

## 2019-04-14 PROCEDURE — 6360000002 HC RX W HCPCS: Performed by: PHYSICAL MEDICINE & REHABILITATION

## 2019-04-14 PROCEDURE — 1280000000 HC REHAB R&B

## 2019-04-14 RX ADMIN — GABAPENTIN 1200 MG: 300 CAPSULE ORAL at 13:55

## 2019-04-14 RX ADMIN — PRAVASTATIN SODIUM 40 MG: 40 TABLET ORAL at 20:38

## 2019-04-14 RX ADMIN — SENNOSIDES AND DOCUSATE SODIUM 1 TABLET: 8.6; 5 TABLET ORAL at 20:37

## 2019-04-14 RX ADMIN — OXYCODONE HYDROCHLORIDE 5 MG: 5 TABLET ORAL at 18:51

## 2019-04-14 RX ADMIN — PANTOPRAZOLE SODIUM 40 MG: 40 TABLET, DELAYED RELEASE ORAL at 08:57

## 2019-04-14 RX ADMIN — GABAPENTIN 1200 MG: 300 CAPSULE ORAL at 20:37

## 2019-04-14 RX ADMIN — BACLOFEN 10 MG: 10 TABLET ORAL at 13:55

## 2019-04-14 RX ADMIN — TAMSULOSIN HYDROCHLORIDE 0.4 MG: 0.4 CAPSULE ORAL at 08:56

## 2019-04-14 RX ADMIN — GABAPENTIN 1200 MG: 300 CAPSULE ORAL at 08:56

## 2019-04-14 RX ADMIN — OXYCODONE HYDROCHLORIDE 5 MG: 5 TABLET ORAL at 13:55

## 2019-04-14 RX ADMIN — ASPIRIN 81 MG 81 MG: 81 TABLET ORAL at 08:56

## 2019-04-14 RX ADMIN — GUAIFENESIN 600 MG: 600 TABLET, EXTENDED RELEASE ORAL at 08:56

## 2019-04-14 RX ADMIN — OXYCODONE HYDROCHLORIDE 5 MG: 5 TABLET ORAL at 08:56

## 2019-04-14 RX ADMIN — OXYCODONE HYDROCHLORIDE 10 MG: 5 TABLET ORAL at 22:59

## 2019-04-14 RX ADMIN — GUAIFENESIN 600 MG: 600 TABLET, EXTENDED RELEASE ORAL at 18:51

## 2019-04-14 RX ADMIN — VITAMIN D, TAB 1000IU (100/BT) 1000 UNITS: 25 TAB at 08:56

## 2019-04-14 RX ADMIN — ENOXAPARIN SODIUM 40 MG: 40 INJECTION SUBCUTANEOUS at 08:55

## 2019-04-14 RX ADMIN — Medication 2 PUFF: at 19:29

## 2019-04-14 RX ADMIN — BACLOFEN 10 MG: 10 TABLET ORAL at 20:37

## 2019-04-14 RX ADMIN — DIPHENHYDRAMINE HCL 25 MG: 25 TABLET ORAL at 20:37

## 2019-04-14 RX ADMIN — AMLODIPINE BESYLATE 5 MG: 5 TABLET ORAL at 08:56

## 2019-04-14 RX ADMIN — MAGNESIUM GLUCONATE 500 MG ORAL TABLET 400 MG: 500 TABLET ORAL at 08:55

## 2019-04-14 RX ADMIN — Medication 2 PUFF: at 08:39

## 2019-04-14 RX ADMIN — MAGNESIUM HYDROXIDE 30 ML: 400 SUSPENSION ORAL at 04:42

## 2019-04-14 RX ADMIN — BACLOFEN 10 MG: 10 TABLET ORAL at 08:56

## 2019-04-14 RX ADMIN — TIOTROPIUM BROMIDE 18 MCG: 18 CAPSULE ORAL; RESPIRATORY (INHALATION) at 08:39

## 2019-04-14 RX ADMIN — LORAZEPAM 1 MG: 1 TABLET ORAL at 20:39

## 2019-04-14 RX ADMIN — SENNOSIDES AND DOCUSATE SODIUM 1 TABLET: 8.6; 5 TABLET ORAL at 08:56

## 2019-04-14 ASSESSMENT — PAIN DESCRIPTION - ORIENTATION: ORIENTATION: RIGHT

## 2019-04-14 ASSESSMENT — PAIN DESCRIPTION - LOCATION
LOCATION: HIP;PELVIS
LOCATION: HIP;PELVIS

## 2019-04-14 ASSESSMENT — PAIN DESCRIPTION - ONSET
ONSET: ON-GOING
ONSET: ON-GOING

## 2019-04-14 ASSESSMENT — PAIN DESCRIPTION - PAIN TYPE
TYPE: ACUTE PAIN
TYPE: ACUTE PAIN

## 2019-04-14 ASSESSMENT — PAIN DESCRIPTION - FREQUENCY
FREQUENCY: CONTINUOUS
FREQUENCY: CONTINUOUS

## 2019-04-14 ASSESSMENT — PAIN SCALES - GENERAL
PAINLEVEL_OUTOF10: 6
PAINLEVEL_OUTOF10: 5
PAINLEVEL_OUTOF10: 5
PAINLEVEL_OUTOF10: 9
PAINLEVEL_OUTOF10: 3
PAINLEVEL_OUTOF10: 6

## 2019-04-14 ASSESSMENT — PAIN DESCRIPTION - PROGRESSION
CLINICAL_PROGRESSION: GRADUALLY IMPROVING
CLINICAL_PROGRESSION: GRADUALLY IMPROVING

## 2019-04-14 ASSESSMENT — PAIN - FUNCTIONAL ASSESSMENT: PAIN_FUNCTIONAL_ASSESSMENT: ACTIVITIES ARE NOT PREVENTED

## 2019-04-14 ASSESSMENT — PAIN DESCRIPTION - DESCRIPTORS
DESCRIPTORS: ACHING
DESCRIPTORS: ACHING

## 2019-04-14 NOTE — PLAN OF CARE
Pt remains free from falls at this time. Fall precautions in place including: bed alarm, , armband, bed in lowest position, wheels locked, and SAFE sign outside pts door. Pt instructed to call out with any needs, verbalized understanding. No additional needs at this time, call light within reach, will continue to monitor.

## 2019-04-15 LAB
ANION GAP SERPL CALCULATED.3IONS-SCNC: 10 MMOL/L (ref 3–16)
BASOPHILS ABSOLUTE: 0.1 K/UL (ref 0–0.2)
BASOPHILS RELATIVE PERCENT: 0.7 %
BUN BLDV-MCNC: 15 MG/DL (ref 7–20)
CALCIUM SERPL-MCNC: 8.4 MG/DL (ref 8.3–10.6)
CHLORIDE BLD-SCNC: 97 MMOL/L (ref 99–110)
CO2: 30 MMOL/L (ref 21–32)
CREAT SERPL-MCNC: 0.9 MG/DL (ref 0.8–1.3)
EOSINOPHILS ABSOLUTE: 0.3 K/UL (ref 0–0.6)
EOSINOPHILS RELATIVE PERCENT: 3.1 %
GFR AFRICAN AMERICAN: >60
GFR NON-AFRICAN AMERICAN: >60
GLUCOSE BLD-MCNC: 113 MG/DL (ref 70–99)
HCT VFR BLD CALC: 33.9 % (ref 40.5–52.5)
HEMOGLOBIN: 11.7 G/DL (ref 13.5–17.5)
LYMPHOCYTES ABSOLUTE: 1.4 K/UL (ref 1–5.1)
LYMPHOCYTES RELATIVE PERCENT: 13.9 %
MCH RBC QN AUTO: 31.7 PG (ref 26–34)
MCHC RBC AUTO-ENTMCNC: 34.4 G/DL (ref 31–36)
MCV RBC AUTO: 92 FL (ref 80–100)
MONOCYTES ABSOLUTE: 0.9 K/UL (ref 0–1.3)
MONOCYTES RELATIVE PERCENT: 8.7 %
NEUTROPHILS ABSOLUTE: 7.4 K/UL (ref 1.7–7.7)
NEUTROPHILS RELATIVE PERCENT: 73.6 %
PDW BLD-RTO: 13.4 % (ref 12.4–15.4)
PLATELET # BLD: 388 K/UL (ref 135–450)
PMV BLD AUTO: 7.6 FL (ref 5–10.5)
POTASSIUM REFLEX MAGNESIUM: 4.5 MMOL/L (ref 3.5–5.1)
RBC # BLD: 3.69 M/UL (ref 4.2–5.9)
SODIUM BLD-SCNC: 137 MMOL/L (ref 136–145)
WBC # BLD: 10.1 K/UL (ref 4–11)

## 2019-04-15 PROCEDURE — 1280000000 HC REHAB R&B

## 2019-04-15 PROCEDURE — 80048 BASIC METABOLIC PNL TOTAL CA: CPT

## 2019-04-15 PROCEDURE — 97110 THERAPEUTIC EXERCISES: CPT

## 2019-04-15 PROCEDURE — 97530 THERAPEUTIC ACTIVITIES: CPT

## 2019-04-15 PROCEDURE — 97116 GAIT TRAINING THERAPY: CPT

## 2019-04-15 PROCEDURE — 85025 COMPLETE CBC W/AUTO DIFF WBC: CPT

## 2019-04-15 PROCEDURE — 6370000000 HC RX 637 (ALT 250 FOR IP): Performed by: PHYSICAL MEDICINE & REHABILITATION

## 2019-04-15 PROCEDURE — 6360000002 HC RX W HCPCS: Performed by: PHYSICAL MEDICINE & REHABILITATION

## 2019-04-15 PROCEDURE — 94640 AIRWAY INHALATION TREATMENT: CPT

## 2019-04-15 PROCEDURE — 36415 COLL VENOUS BLD VENIPUNCTURE: CPT

## 2019-04-15 PROCEDURE — 97535 SELF CARE MNGMENT TRAINING: CPT

## 2019-04-15 RX ADMIN — OXYCODONE HYDROCHLORIDE 10 MG: 5 TABLET ORAL at 21:48

## 2019-04-15 RX ADMIN — BACLOFEN 10 MG: 10 TABLET ORAL at 09:22

## 2019-04-15 RX ADMIN — SENNOSIDES AND DOCUSATE SODIUM 1 TABLET: 8.6; 5 TABLET ORAL at 21:47

## 2019-04-15 RX ADMIN — LORAZEPAM 1 MG: 1 TABLET ORAL at 21:47

## 2019-04-15 RX ADMIN — AMLODIPINE BESYLATE 5 MG: 5 TABLET ORAL at 09:22

## 2019-04-15 RX ADMIN — PANTOPRAZOLE SODIUM 40 MG: 40 TABLET, DELAYED RELEASE ORAL at 06:03

## 2019-04-15 RX ADMIN — OXYCODONE HYDROCHLORIDE 5 MG: 5 TABLET ORAL at 18:10

## 2019-04-15 RX ADMIN — VITAMIN D, TAB 1000IU (100/BT) 1000 UNITS: 25 TAB at 09:22

## 2019-04-15 RX ADMIN — OXYCODONE HYDROCHLORIDE 10 MG: 5 TABLET ORAL at 13:57

## 2019-04-15 RX ADMIN — GABAPENTIN 1200 MG: 300 CAPSULE ORAL at 21:46

## 2019-04-15 RX ADMIN — ENOXAPARIN SODIUM 40 MG: 40 INJECTION SUBCUTANEOUS at 09:22

## 2019-04-15 RX ADMIN — OXYCODONE HYDROCHLORIDE 10 MG: 5 TABLET ORAL at 09:22

## 2019-04-15 RX ADMIN — GUAIFENESIN 600 MG: 600 TABLET, EXTENDED RELEASE ORAL at 18:10

## 2019-04-15 RX ADMIN — SENNOSIDES AND DOCUSATE SODIUM 1 TABLET: 8.6; 5 TABLET ORAL at 09:22

## 2019-04-15 RX ADMIN — MAGNESIUM GLUCONATE 500 MG ORAL TABLET 400 MG: 500 TABLET ORAL at 09:22

## 2019-04-15 RX ADMIN — GUAIFENESIN 600 MG: 600 TABLET, EXTENDED RELEASE ORAL at 09:22

## 2019-04-15 RX ADMIN — PRAVASTATIN SODIUM 40 MG: 40 TABLET ORAL at 21:47

## 2019-04-15 RX ADMIN — BACLOFEN 10 MG: 10 TABLET ORAL at 21:47

## 2019-04-15 RX ADMIN — TIOTROPIUM BROMIDE 18 MCG: 18 CAPSULE ORAL; RESPIRATORY (INHALATION) at 08:31

## 2019-04-15 RX ADMIN — GABAPENTIN 1200 MG: 300 CAPSULE ORAL at 13:57

## 2019-04-15 RX ADMIN — ASPIRIN 81 MG 81 MG: 81 TABLET ORAL at 09:22

## 2019-04-15 RX ADMIN — Medication 2 PUFF: at 08:31

## 2019-04-15 RX ADMIN — Medication 2 PUFF: at 20:30

## 2019-04-15 RX ADMIN — DIPHENHYDRAMINE HCL 25 MG: 25 TABLET ORAL at 21:47

## 2019-04-15 RX ADMIN — TAMSULOSIN HYDROCHLORIDE 0.4 MG: 0.4 CAPSULE ORAL at 09:22

## 2019-04-15 RX ADMIN — GABAPENTIN 1200 MG: 300 CAPSULE ORAL at 09:22

## 2019-04-15 RX ADMIN — BACLOFEN 10 MG: 10 TABLET ORAL at 13:57

## 2019-04-15 ASSESSMENT — PAIN DESCRIPTION - FREQUENCY
FREQUENCY: CONTINUOUS

## 2019-04-15 ASSESSMENT — PAIN DESCRIPTION - PAIN TYPE
TYPE: ACUTE PAIN

## 2019-04-15 ASSESSMENT — PAIN DESCRIPTION - LOCATION
LOCATION: HIP;PELVIS
LOCATION: HIP
LOCATION: HIP;PELVIS

## 2019-04-15 ASSESSMENT — PAIN SCALES - GENERAL
PAINLEVEL_OUTOF10: 8
PAINLEVEL_OUTOF10: 0
PAINLEVEL_OUTOF10: 7
PAINLEVEL_OUTOF10: 8
PAINLEVEL_OUTOF10: 8
PAINLEVEL_OUTOF10: 0
PAINLEVEL_OUTOF10: 6

## 2019-04-15 ASSESSMENT — PAIN DESCRIPTION - ONSET
ONSET: ON-GOING
ONSET: ON-GOING

## 2019-04-15 ASSESSMENT — PAIN - FUNCTIONAL ASSESSMENT: PAIN_FUNCTIONAL_ASSESSMENT: ACTIVITIES ARE NOT PREVENTED

## 2019-04-15 ASSESSMENT — PAIN DESCRIPTION - PROGRESSION
CLINICAL_PROGRESSION: NOT CHANGED
CLINICAL_PROGRESSION: NOT CHANGED

## 2019-04-15 ASSESSMENT — PAIN DESCRIPTION - ORIENTATION: ORIENTATION: RIGHT

## 2019-04-15 ASSESSMENT — PAIN DESCRIPTION - DESCRIPTORS
DESCRIPTORS: ACHING

## 2019-04-15 NOTE — PROGRESS NOTES
Joselito Louise  4/15/2019  [id number]    Chief Complaint: Debility    Subjective:   Sleeping well now that lorazepam restarted. No new c/o. Adequate pain control. ROS: No cp, sob, n/v  Objective:  Patient Vitals for the past 24 hrs:   BP Temp Temp src Pulse Resp SpO2 Weight   04/15/19 0833 -- -- -- -- 17 94 % --   04/15/19 0815 (!) 117/53 98.3 °F (36.8 °C) Oral 98 18 90 % --   04/15/19 0643 -- -- -- -- -- -- 175 lb 4.3 oz (79.5 kg)   04/14/19 2259 -- -- -- -- -- 91 % --   04/14/19 2037 (!) 124/57 98.6 °F (37 °C) Axillary 98 18 -- --   04/14/19 1929 -- -- -- -- -- 93 % --     Gen: No distress, pleasant. HEENT: Normocephalic, atraumatic. CV: Regular rate and rhythm. Resp: No respiratory distress. Abd: Soft, nontender   Ext: No edema. Neuro: Alert, oriented, appropriately interactive. Wt Readings from Last 3 Encounters:   04/15/19 175 lb 4.3 oz (79.5 kg)       Laboratory data:   Lab Results   Component Value Date    WBC 10.1 04/15/2019    HGB 11.7 (L) 04/15/2019    HCT 33.9 (L) 04/15/2019    MCV 92.0 04/15/2019     04/15/2019       Lab Results   Component Value Date     04/15/2019    K 4.5 04/15/2019    CL 97 04/15/2019    CO2 30 04/15/2019    BUN 15 04/15/2019    CREATININE 0.9 04/15/2019    GLUCOSE 113 04/15/2019    CALCIUM 8.4 04/15/2019        Therapy progress:  PT  Position Activity Restriction  Other position/activity restrictions: flat foot Toe touch weightbearing on RLE, weightbearing as tolerated on LLE. Notify physician for pulse less than 50 or greater than 120, respiratory rate less than 12 or greater than 25,systolic BP less than 90 or greater than 660, diastolic BP less than 50 or greater than 100. Objective     Sit to Stand: Moderate Assistance  Stand to sit: Moderate Assistance  Bed to Chair: Moderate assistance, Maximum assistance(Mod sit to stand and mod>max stand to sit )  Stand Pivot Transfers: Moderate Assistance  Device: [address]:  Moderate assistance  Distance: 3 feet  OT  PT Equipment Recommendations  Equipment Needed: Yes  Mobility Devices: Wheelchair  Walker: Rolling  Wheelchair: Standard  Other: will continue to assess  Toilet - Technique: Stand pivot  Equipment Used: Standard toilet(with grab bars)  Assessment        SLP                Body mass index is 23.77 kg/m². Rehabilitation Diagnosis:   Orthopedic, 8.11, Unilateral Hip Fracture        Assessment and Plan:     Right acetabulum, inferior/superior pubic rami, and sacral fractures - Managed non-operatively. Pain control. TTWB RLE and WBAT LLE. PT/OT.     Intramuscular hematoma (piriformis, obturator) - Monitor Hgb.       COPD exacerbation - Completed steroids. Dulera, spiriva, mucinex, prn duonebs.       HTN - amlodipine     HLD - pravastatin     GERD - pantoprazole     PTSD - Restarted lorazepam at lower dose as he has been off of this for >1 week and is now also on oxycodone. Pending response consider increasing to home dose of 2mg qhs. Doign well so far.      Bladder - high risk retention - Monitor PVRs, SC prn >300cc. Flomax.      Bowel - high risk constipation - colace=senna BID, PRN MoM. follow bowel movements. Enema or suppository if needed.      Pain control - Gabapentin (for chronic neuropathy), baclofen, prn oxycodone     DVT PPx - lovenox     FULL CODE        Bryan Blanco MD 4/15/2019, 8:47 AM

## 2019-04-15 NOTE — PROGRESS NOTES
Occupational Therapy  Facility/Department: [facility]  Daily Treatment Note  NAME: Eden Mccord  : 1945  MRN: [id number]    Date of Service: 4/15/2019    Discharge Recommendations:  Home with Home health OT, Home with assist PRN  OT Equipment Recommendations  Other: will likely need shower chair, possible RTS as needed    Assessment   Performance deficits / Impairments: Decreased functional mobility ; Decreased ADL status; Decreased strength;Decreased safe awareness;Decreased endurance;Decreased balance  Assessment: Pt pleasant and cooperative, talkative, requiring frequent redirection to tasks. Pt able to complete grooming seated at sink, increased time to complete. Pt with multiple \"coughing spells\" throughout session that causes increased pain and takes a few min to recover. Pt progressing with transfers and activity tolerance for B UE ex. Cont POC. Patient Education: OT role, ADLs, transfers, WB status  Activity Tolerance  Activity Tolerance: Patient Tolerated treatment well;Patient limited by pain  Activity Tolerance: limited by coughing spells  Safety Devices  Safety Devices in place: Yes  Type of devices: Gait belt(left with PT)         Patient Diagnosis(es): There were no encounter diagnoses. has a past medical history of Allergic rhinitis, Anxiety, COPD (chronic obstructive pulmonary disease) ([address] Emphysema of lung ([address] GERD (gastroesophageal reflux disease), Hypercholesteremia, Hypertension, Lumbar radiculopathy, and PTSD (post-traumatic stress disorder). has no past surgical history on file. Restrictions  Lower Extremity Weight Bearing Restrictions  Right Lower Extremity Weight Bearing: Flat Foot Weight Bearing  Left Lower Extremity Weight Bearing: Weight Bearing As Tolerated  Position Activity Restriction  Other position/activity restrictions: flat foot Toe touch weightbearing on RLE, weightbearing as tolerated on LLE.   Notify physician for pulse less than 50 or greater than 120, respiratory rate less than 12 or greater than 25,systolic BP less than 90 or greater than 544, diastolic BP less than 50 or greater than 100. Subjective   General  Chart Reviewed: Yes  Patient assessed for rehabilitation services?: Yes  Additional Pertinent Hx: anxiety, COPD, emphysema, GERD, HTN, PTSD, lumbar radiculopathy, pelvic fx, sacral fx, fracture of R hip, pubic rami fx, laryngeal CA s/p sx, RCC s/p partial nephrectomy  Family / Caregiver Present: No  Referring Practitioner: Josue Flores MD  Diagnosis: fall, s/p multiple fractures  Subjective  Subjective: Patient up in wheelchair upon arrival, agreeable to OT intervention  General Comment  Comments: RN cleared for treatment  Pain Assessment  Pain Level: 8  Pain Type: Acute pain  Pain Location: Hip;Pelvis  Pain Orientation: Right  Pain Descriptors: Aching  Pain Frequency: Continuous  Vital Signs  Patient Currently in Pain: Yes   Orientation  Orientation  Overall Orientation Status: Within Functional Limits  Objective    ADL  Grooming: Setup; Increased time to complete  Toileting: Moderate assistance(clothing mgmt d/t pants falling to ankles)  Additional Comments: declines ADL other than grooming        Balance  Sitting Balance: Supervision  Standing Balance: Contact guard assistance  Standing Balance  Time: <1 min x 2  Activity: toilet transfer<->w/c  Sit to stand: Minimal assistance  Stand to sit: Minimal assistance  Comment: grab bars  Functional Mobility  Functional - Mobility Device: Wheelchair  Activity: To/from bathroom; Other  Assist Level: Supervision  Toilet Transfers  Toilet - Technique: Stand pivot  Equipment Used: Standard toilet(grab bars)  Toilet Transfer: Minimal assistance     Transfers  Sit to stand: Minimal assistance  Stand to sit: Minimal assistance  Transfer Comments: cues for hand placement and anterior weight shift in prep for standing                       Cognition  Overall Cognitive Status: Exceptions  Arousal/Alertness: Appropriate responses to stimuli  Following Commands: Follows one step commands with repetition; Follows one step commands with increased time  Attention Span: Attends with cues to redirect  Memory: Decreased short term memory  Safety Judgement: Decreased awareness of need for assistance  Problem Solving: Assistance required to identify errors made  Insights: Decreased awareness of deficits  Initiation: Requires cues for some  Sequencing: Requires cues for some  Cognition Comment: pt very talkative, needs redirection throughout session, difficulty making choices re: shower today vs another day                    Type of ROM/Therapeutic Exercise  Type of ROM/Therapeutic Exercise: AROM; Free weights  Comment: 4#  Exercises  Scapular Protraction: 2 sets x15  Scapular Retraction: 2 sets x15  Shoulder Flexion: 2 sets x15  Shoulder ABduction: 2 sets x15  Shoulder ADduction: 2 sets x15  Horizontal ABduction: 2 sets x15  Horizontal ADduction: 2 sets x15  Elbow Flexion: 2 sets x15  Elbow Extension: 2 sets x15  Supination: 2 sets x15  Pronation: 2 sets x15  Wrist Flexion: 2 sets x15  Wrist Extension: 2 sets x15                    Plan   Plan  Times per week: 5/7 days/wk  Current Treatment Recommendations: Strengthening, Balance Training, Functional Mobility Training, Endurance Training, Patient/Caregiver Education & Training, Equipment Evaluation, Education, & procurement, Self-Care / ADL    Goals  Short term goals  Time Frame for Short term goals: to be met by 4/18/19  Short term goal 1: Pt will complete LB ADLs with min A with AE as needed  Short term goal 2: Pt will complete functional transfers with Min A-met 4/15  Short term goal 3: Pt will complete toileting with min A or less  Short term goal 4: pt will adhere to WB status with all transfers and mobility  Long term goals  Time Frame for Long term goals : to be met 4/25/19  Long term goal 1: Pt will complete ADLs Mod I with AE as needed  Long term goal 2: Pt will complete

## 2019-04-15 NOTE — PROGRESS NOTES
Physical Therapy  Facility/Department: [facility]  Daily Treatment Note  NAME: Kevin Baker  : 1945  MRN: [id number]    Date of Service: 4/15/2019    Discharge Recommendations:  Continue to assess pending progress        Patient Diagnosis(es): There were no encounter diagnoses. has a past medical history of Allergic rhinitis, Anxiety, COPD (chronic obstructive pulmonary disease) ([facility] [address] Emphysema of lung ([facility] [address] GERD (gastroesophageal reflux disease), Hypercholesteremia, Hypertension, Lumbar radiculopathy, and PTSD (post-traumatic stress disorder). has no past surgical history on file. Restrictions  Lower Extremity Weight Bearing Restrictions  Right Lower Extremity Weight Bearing: Flat Foot Weight Bearing  Left Lower Extremity Weight Bearing: Weight Bearing As Tolerated  Position Activity Restriction  Other position/activity restrictions: flat foot Toe touch weightbearing on RLE, weightbearing as tolerated on LLE. Notify physician for pulse less than 50 or greater than 120, respiratory rate less than 12 or greater than 25,systolic BP less than 90 or greater than 526, diastolic BP less than 50 or greater than 100. Subjective  Pt reports pain severe when mobilizing from sit<>stand with sup<>sit and with gait and \"tolerable\" at rest. PT noted \"I hate this coughing. \"  Pt rated pain at 8/10 at rest.   Pt required frequent redirection for all therapeutic activity as pt loses train of thought during functional activity            Orientation: to person, place, time. Objective   Bed mobility  Supine to Sit: (2/2 trials)  Sit to Supine: (3/3 trials )  Comment: cues for managing LE and scooting. PT given constant cues for angling body on bed for decreased RLE stress. Transfers  Sit to Stand: Moderate Assistance;Minimal Assistance  Stand to sit: Moderate Assistance  Comment: Educated in sit<> parallel bars 3/3 trials with mod  assist each and cues for UE and LE sequence from [facility].    Ambulation Goals  Short term goals  Time Frame for Short term goals: To be met by 4/18/19  Short term goal 1: Pt to perform supine to/from sit transfers at flat bed without rails with supervision. GOAL met 4/15/2019 Pt demo supervision/SBA for sup<>sit transfers. Short term goal 2: Pt to perform sit to/from stand at [facility] with CGA. Short term goal 3: Pt to ambulate 50 ft with LRAD with CGA for household mobility. Short term goal 4: Pt to propel wheelchair 50 ft on level surface with mod I for household mobility. GOAL MET 4/12/2019 Patient demo 205 feet SYL WC propulsion level and carpet with left/.right tuns and thru doorways  Short term goal 5: Pt to demonstrate car transfer with RW with mod A x 1. Long term goals  Time Frame for Long term goals : To be met by 4/25/19  Long term goal 1: Pt to perform supine to/from sit transfers at flat bed without rails with mod I.  Long term goal 2: Pt to perform sit to/from stand at [facility] with mod I.  Long term goal 3: Pt to ambulate 150 ft with LRAD with mod I for community mobility. Long term goal 4: Pt to manage 1 curb step with wheelchair or LRAD with mod I for home entry. Long term goal 5: Pt to demonstrate car transfer with RW with min A x 1. Patient Goals   Patient goals : \"to be home and able to drive and do what I was doing\"    Plan    Plan  Times per week: 5/7 days week   Times per day: Daily  Current Treatment Recommendations: Strengthening, Functional Mobility Training, Wheelchair Mobility Training, Neuromuscular Re-education, Home Exercise Program, Equipment Evaluation, Education, & procurement, Safety Education & Training, Gait Training, Transfer Training, ROM, Balance Training, Endurance Training, Stair training, Pain Management, Patient/Caregiver Education & Training, Positioning  Safety Devices  Type of devices:  All fall risk precautions in place, Chair alarm in place, Left in chair, Call light within reach  Restraints  Initially in place: No     Therapy

## 2019-04-15 NOTE — PATIENT CARE CONFERENCE
[facility]  Inpatient Rehabilitation  Weekly Team Conference Note    Date:   Patient Name: Don Lao        MRN: [id number]    : 1945  (78 y.o.)  Gender: male   Referring Practitioner: Miguelina Bhandari MD  Diagnosis: Debility      Interventions to be utilized toward barriers to discharge, per discipline:  [address] observed barriers to dc: Decreased endurance and Lower extremity weakness  Nursing interventions:  Family Education: pain management  Fall Risk: yes    Physical therapy observed barriers to dc:    Baseline: indep   Current level:requires assist of 1 to extensive assist with transfers   Barriers to DC: home will need to be WC accessible as pt will be indep upon DC at [facility] 23 level with gait for transfers only   Needs in order to achieve dc home/next level of care: indep in transfers distance into bathroom, bed<>chair, will need assist with higher level tasks      Physical therapy interventions:   Current Treatment Recommendations: Strengthening, Functional Mobility Training, Wheelchair Mobility Training, Neuromuscular Re-education, Home Exercise Program, Equipment Evaluation, Education, & procurement, Safety Education & Training, Gait Training, Transfer Training, ROM, Balance Training, Endurance Training, Stair training, Pain Management, Patient/Caregiver Education & Training, Positioning      PHYSICAL THERAPY  FIMs last conference:NA      FIMS current conference:  Bed, Chair, Wheel Chair: 2 - Requires 50-74% assistance to transfer  Walk: 1 - Total Assistance Walks < 50 feet OR requires two or more people OR patient performs < 25% of locomotion effort  Distance Walked: 18'  Wheel Chair: 6 - [address] Operates wheelchair at least 150 feet with an ambulatory device, orthosis or prosthesis OR requires extra amount of time OR there is concern for safety  Distance Traveled in Wheel Chair: 150 feet  Stairs: 0 - Activity Does not Occur ( 0 only for the admission assessment)    PT Equipment Recommendations  Equipment Needed: Yes  Mobility Devices: Wheelchair  Walker: Rolling  Wheelchair: Standard  Other: will continue to assess    Assessment: Pt was limited due to pain and generalized deconditioning. 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   Current level: grooming SPV, UB ADLs min A, LB ADLs max A, transfers min A   Barriers to DC: son unable to physically assist   Needs in order to achieve dc home/next level of care: SPV to mod I for ADLs and transfers    Occupational Therapy interventions:  Current Treatment Recommendations: Strengthening, Balance Training, Functional Mobility Training, Endurance Training, Patient/Caregiver Education & Training, Equipment Evaluation, Education, & procurement, Self-Care / ADL      OCCUPATIONAL THERAPY  FIMs last conference N/A      FIMS: current conference  Eatin - Patient feeds self  Groomin - Requires setup/cues to do all tasks  Bathin - Able to bathe 3-4 areas  Dressing-Upper: 4 - Requires assist with buttons/zippers only and/or requires assist with one arm only  Dressing-Lower: 2 - Requires assist with 4-5 parts of dressing  Toileting: 3 - Able to perform 2 tasks  Toilet Transfer: 2 - Requires 50-74% assist getting off toilet  Shower Transfer: 0 - Activity does not occur(pt refused)      Assessment: Patient pleasant and cooperative throughout session, talkative and requires frequent redirection to task. Patient sat to complete grooming tasks-shaving with blade, brush teeth. Sat to complete BUE ex with 3# free weight. Cont OT POC.          Speech therapy observed barriers to dc:    Baseline:    Current level:   Barriers to DC:   Needs in order to achieve dc home/next level of care:    Speech Therapy interventions:  Dysphagia:    Speech/Language/Cognition:        SPEECH THERAPY (intentionally left blank if not actively being seen by this service):       NUTRITION  Weight: 175 lb 4.3 oz (79.5 kg) / Body mass index is 23.77 kg/m². Diet Order: DIET GENERAL;  Dietary Nutrition Supplements: Standard High Calorie Oral Supplement  PO Meals Eaten (%): 76 - 100%  Education: Declined      CASE MANAGEMENT  Assessment: Patient lives at home with his son who has down syndrome. He has the [place] and everything will need to be arranged through the [place] which will take time. He is 100% service connected. Will follow        Interdisciplinary Goals:   1.) Pt will complete toileting with min A or less  2.) PT will transfer bed<>chair without need for cues for safety and sequencing SYL  3.)  4.)  5.)    Discharge Plan   Estimated discharge date: 04-  Destination: home health  Pass:No  Services at Discharge: [address], Occupational Therapy and Nursing Other pending eval  Equipment at Discharge: Ramp, Rolling Walker, Manual Wheelchair with swing back arm rests and elevating leg rest, Shower Chair    Team Members Present at Conference:  : Jhonny Chino MSW, LSW    Occupational Therapist: William Tobar OT  Physical Therapist: Cresencio Torres PT  Speech Therapist: n/a  Nurse: Jimmy Thrasher RN BSN   Dietician: Sage Vázquez RDN, LD  :  FAB Brock    Psychiatry: n/a    Family members present at conference: n/a      I led this team conference and I approve the established interdisciplinary plan of care as documented within the medical record of Renan Palacios MD: Jonas Snow.  Ingrid Vieyra MD 4/16/2019, 3:41 PM

## 2019-04-15 NOTE — CARE COORDINATION
Chart review completed on this date. Any DME or services patient needs will need to be arranged through the [place] prior to discharge.     As soon as writer knows what is needed, will contact the [place] for approval.     Elijah Turner MSW, LSW

## 2019-04-16 PROCEDURE — 97116 GAIT TRAINING THERAPY: CPT

## 2019-04-16 PROCEDURE — 6370000000 HC RX 637 (ALT 250 FOR IP): Performed by: PHYSICAL MEDICINE & REHABILITATION

## 2019-04-16 PROCEDURE — 97110 THERAPEUTIC EXERCISES: CPT

## 2019-04-16 PROCEDURE — 6360000002 HC RX W HCPCS: Performed by: PHYSICAL MEDICINE & REHABILITATION

## 2019-04-16 PROCEDURE — 97530 THERAPEUTIC ACTIVITIES: CPT

## 2019-04-16 PROCEDURE — 94640 AIRWAY INHALATION TREATMENT: CPT

## 2019-04-16 PROCEDURE — 1280000000 HC REHAB R&B

## 2019-04-16 PROCEDURE — 97535 SELF CARE MNGMENT TRAINING: CPT

## 2019-04-16 RX ADMIN — VITAMIN D, TAB 1000IU (100/BT) 1000 UNITS: 25 TAB at 10:13

## 2019-04-16 RX ADMIN — GUAIFENESIN 600 MG: 600 TABLET, EXTENDED RELEASE ORAL at 18:08

## 2019-04-16 RX ADMIN — GABAPENTIN 1200 MG: 300 CAPSULE ORAL at 21:21

## 2019-04-16 RX ADMIN — GABAPENTIN 1200 MG: 300 CAPSULE ORAL at 10:12

## 2019-04-16 RX ADMIN — Medication 2 PUFF: at 08:47

## 2019-04-16 RX ADMIN — MAGNESIUM GLUCONATE 500 MG ORAL TABLET 400 MG: 500 TABLET ORAL at 10:13

## 2019-04-16 RX ADMIN — Medication 2 PUFF: at 21:42

## 2019-04-16 RX ADMIN — SENNOSIDES AND DOCUSATE SODIUM 1 TABLET: 8.6; 5 TABLET ORAL at 21:21

## 2019-04-16 RX ADMIN — ENOXAPARIN SODIUM 40 MG: 40 INJECTION SUBCUTANEOUS at 10:13

## 2019-04-16 RX ADMIN — TIOTROPIUM BROMIDE 18 MCG: 18 CAPSULE ORAL; RESPIRATORY (INHALATION) at 08:47

## 2019-04-16 RX ADMIN — SENNOSIDES AND DOCUSATE SODIUM 1 TABLET: 8.6; 5 TABLET ORAL at 10:13

## 2019-04-16 RX ADMIN — TAMSULOSIN HYDROCHLORIDE 0.4 MG: 0.4 CAPSULE ORAL at 10:13

## 2019-04-16 RX ADMIN — OXYCODONE HYDROCHLORIDE 10 MG: 5 TABLET ORAL at 10:24

## 2019-04-16 RX ADMIN — GUAIFENESIN 600 MG: 600 TABLET, EXTENDED RELEASE ORAL at 10:13

## 2019-04-16 RX ADMIN — OXYCODONE HYDROCHLORIDE 10 MG: 5 TABLET ORAL at 21:21

## 2019-04-16 RX ADMIN — BACLOFEN 10 MG: 10 TABLET ORAL at 21:21

## 2019-04-16 RX ADMIN — GABAPENTIN 1200 MG: 300 CAPSULE ORAL at 14:24

## 2019-04-16 RX ADMIN — BACLOFEN 10 MG: 10 TABLET ORAL at 14:24

## 2019-04-16 RX ADMIN — AMLODIPINE BESYLATE 5 MG: 5 TABLET ORAL at 10:13

## 2019-04-16 RX ADMIN — DIPHENHYDRAMINE HCL 25 MG: 25 TABLET ORAL at 21:21

## 2019-04-16 RX ADMIN — LORAZEPAM 1 MG: 1 TABLET ORAL at 21:21

## 2019-04-16 RX ADMIN — PANTOPRAZOLE SODIUM 40 MG: 40 TABLET, DELAYED RELEASE ORAL at 05:49

## 2019-04-16 RX ADMIN — BACLOFEN 10 MG: 10 TABLET ORAL at 10:13

## 2019-04-16 RX ADMIN — ASPIRIN 81 MG 81 MG: 81 TABLET ORAL at 10:13

## 2019-04-16 RX ADMIN — PRAVASTATIN SODIUM 40 MG: 40 TABLET ORAL at 21:21

## 2019-04-16 ASSESSMENT — PAIN DESCRIPTION - FREQUENCY: FREQUENCY: CONTINUOUS

## 2019-04-16 ASSESSMENT — PAIN DESCRIPTION - ORIENTATION
ORIENTATION: RIGHT
ORIENTATION: RIGHT;LEFT

## 2019-04-16 ASSESSMENT — PAIN SCALES - GENERAL
PAINLEVEL_OUTOF10: 0
PAINLEVEL_OUTOF10: 8
PAINLEVEL_OUTOF10: 0
PAINLEVEL_OUTOF10: 2
PAINLEVEL_OUTOF10: 6

## 2019-04-16 ASSESSMENT — PAIN DESCRIPTION - LOCATION
LOCATION: HIP;PELVIS
LOCATION: PELVIS

## 2019-04-16 ASSESSMENT — PAIN DESCRIPTION - PAIN TYPE
TYPE: ACUTE PAIN
TYPE: ACUTE PAIN

## 2019-04-16 ASSESSMENT — PAIN - FUNCTIONAL ASSESSMENT: PAIN_FUNCTIONAL_ASSESSMENT: PREVENTS OR INTERFERES SOME ACTIVE ACTIVITIES AND ADLS

## 2019-04-16 ASSESSMENT — PAIN DESCRIPTION - ONSET: ONSET: ON-GOING

## 2019-04-16 ASSESSMENT — PAIN DESCRIPTION - DIRECTION: RADIATING_TOWARDS: LEFT TO RIGHT

## 2019-04-16 ASSESSMENT — PAIN DESCRIPTION - PROGRESSION: CLINICAL_PROGRESSION: NOT CHANGED

## 2019-04-16 ASSESSMENT — PAIN DESCRIPTION - DESCRIPTORS
DESCRIPTORS: ACHING;DISCOMFORT;SORE
DESCRIPTORS: ACHING

## 2019-04-16 NOTE — PROGRESS NOTES
Don Kappa  4/16/2019  [id number]    Chief Complaint: Debility    Subjective:   No acute events overnight. Patient seen this am sitting up in room. Sleep and anxiety well controlled with current dose of ativan, does not feel he needs to increase to home dose. ROS: No cp, sob, n/v  Objective:  Patient Vitals for the past 24 hrs:   BP Temp Temp src Pulse Resp SpO2 Weight   04/16/19 0853 -- -- -- -- -- 94 % --   04/16/19 0553 -- -- -- -- -- -- 181 lb (82.1 kg)   04/15/19 2145 (!) 122/48 98.5 °F (36.9 °C) Oral 79 16 92 % --   04/15/19 2030 -- -- -- -- -- 92 % --     Gen: No distress, pleasant. HEENT: Normocephalic, atraumatic. CV: Regular rate and rhythm. Resp: No respiratory distress. Abd: Soft, nontender   Ext: No edema. Neuro: Alert, oriented, appropriately interactive. Wt Readings from Last 3 Encounters:   04/16/19 181 lb (82.1 kg)       Laboratory data:   Lab Results   Component Value Date    WBC 10.1 04/15/2019    HGB 11.7 (L) 04/15/2019    HCT 33.9 (L) 04/15/2019    MCV 92.0 04/15/2019     04/15/2019       Lab Results   Component Value Date     04/15/2019    K 4.5 04/15/2019    CL 97 04/15/2019    CO2 30 04/15/2019    BUN 15 04/15/2019    CREATININE 0.9 04/15/2019    GLUCOSE 113 04/15/2019    CALCIUM 8.4 04/15/2019        Therapy progress:  PT  Position Activity Restriction  Other position/activity restrictions: flat foot Toe touch weightbearing on RLE, weightbearing as tolerated on LLE. Notify physician for pulse less than 50 or greater than 120, respiratory rate less than 12 or greater than 25,systolic BP less than 90 or greater than 260, diastolic BP less than 50 or greater than 100. Objective     Sit to Stand: Moderate Assistance  Stand to sit: Moderate Assistance  Bed to Chair: Moderate assistance, Maximum assistance(Mod sit to stand and mod>max stand to sit )  Stand Pivot Transfers:  Moderate Assistance  Device: Parallel Bars  Assistance: Dependent/Total(min assist for LE foot flat and RLE position  in parallel bars)  Distance: 10 feet, 8 feet  OT  PT Equipment Recommendations  Equipment Needed: Yes  Mobility Devices: Wheelchair  Walker: Rolling  Wheelchair: Standard  Other: will continue to assess  Toilet - Technique: Stand pivot  Equipment Used: Standard toilet(grab bars)  Assessment        SLP                Body mass index is 24.55 kg/m². Rehabilitation Diagnosis:   Orthopedic, 8.11, Unilateral Hip Fracture        Assessment and Plan:     Right acetabulum, inferior/superior pubic rami, and sacral fractures - Managed non-operatively. Pain control. TTWB RLE and WBAT LLE. PT/OT.     Intramuscular hematoma (piriformis, obturator) - Monitor Hgb.       COPD exacerbation - Completed steroids. Dulera, spiriva, mucinex, prn duonebs.       HTN - amlodipine     HLD - pravastatin     GERD - pantoprazole     PTSD - Restarted lorazepam at lower dose as he has been off of this for >1 week and is now also on oxycodone. Pending response consider increasing to home dose of 2mg qhs. Doign well so far.      Bladder - high risk retention - Monitor PVRs, SC prn >300cc. Flomax.      Bowel - high risk constipation - colace=senna BID, PRN MoM. follow bowel movements. Enema or suppository if needed.      Pain control - Gabapentin (for chronic neuropathy), baclofen, prn oxycodone     DVT PPx - lovenox     Interdisciplinary team conference was held today with entire rehab treatment team including PT, OT, Dietician, RN, and SW. Discussion focused on progress toward rehab goals and discharge planning. Making progress with transfers, wheelchair mobility, compensatory strategies. Separate conference then held with patient and family, questions answered and concerns addressed. Total treatment time >35 min with greater than 50% spent in care coordination.   Anticipated dispo:  Services:  PT, OT, RN  DME: wheelchair, ramp, RW for transfers, shower chair  ELOS: 4/26    Yogi Ramsey was evaluated today and a DME order was entered for a standard wheelchair because he requires this to successfully complete daily living tasks of personal cares and ambulating. A standard manual wheelchair is necessary due to patient's impaired ambulation and mobility restrictions and would be unable to resolve these daily living tasks using a cane or walker. The patient is capable of using a standard wheelchair safely in their home and can maneuver within their home with adequate access. There is a caregiver available to provide necessary assistance. The need for this equipment was discussed with the patient and he understands, is in agreement, and has not expressed an unwillingness to use the wheelchair. Paticia Delay requires elevating legrest due to a musculoskeletal condition or the presence of a cast or brace which prevents 90 degree flexion at the knee. Enedinaa Delay was evaluated today and a DME order was entered for a wheeled walker because he requires this to successfully complete daily living tasks of transfers to wheelchair. A wheeled walker is necessary due to the patient's unsteady gait, upper body weakness, and inability to  an ambulation device; and he can ambulate only by pushing a walker instead of a lesser assistive device such as a cane, crutch, or standard walker. The need for this equipment was discussed with the patient and he understands and is in agreement. [facility]    Enedinaa Delay was evaluated today and a DME order was entered for a bath/shower seat because he requires this to successfully complete daily living tasks of bathing and grooming. Patient requires a bath/shower seat due to weakness and limited ability to transfer/navigate the setting. Without the bath/shower seat, Abdiaziz Roach is at increased risk for falls and would require increased assistance for ADL's and mobility. Vashti Rojas.  Demetria Guevara MD 4/16/2019, 10:27 AM

## 2019-04-16 NOTE — PROGRESS NOTES
Problem: Falls - Risk of  Goal: Absence of falls  Outcome: Ongoing  Pt free from falls this shift. Fall precautions in place at all times. Call light always within reach.  Pt able and agreeable to contact for safety appropriately.

## 2019-04-16 NOTE — PROGRESS NOTES
assistance  Shower Transfers  Shower - Transfer Type: To and From  Shower - Transfer To: Transfer tub bench  Shower - Technique: Stand pivot  Shower Transfers: Minimal assistance  Shower Transfers Comments: pt will need shower chair d/t WB status     Transfers  Stand Pivot Transfers: Minimal assistance  Sit to stand: Minimal assistance  Stand to sit: Minimal assistance  Transfer Comments: cues for hand placement and anterior weight shift in prep for standing                       Cognition  Overall Cognitive Status: Exceptions  Arousal/Alertness: Appropriate responses to stimuli  Following Commands: Follows one step commands with repetition; Follows one step commands with increased time  Attention Span: Attends with cues to redirect  Memory: Decreased short term memory  Safety Judgement: Decreased awareness of need for assistance  Problem Solving: Assistance required to identify errors made  Insights: Decreased awareness of deficits  Initiation: Requires cues for some        Plan   Plan  Times per week: 5/7 days/wk  Current Treatment Recommendations: Strengthening, Balance Training, Functional Mobility Training, Endurance Training, Patient/Caregiver Education & Training, Equipment Evaluation, Education, & procurement, Self-Care / ADL    Goals  Short term goals  Time Frame for Short term goals: to be met by 4/18/19  Short term goal 1: Pt will complete LB ADLs with min A with AE as needed  Short term goal 2: Pt will complete functional transfers with Min A-met 4/15  Short term goal 3: Pt will complete toileting with min A or less  Short term goal 4: pt will adhere to WB status with all transfers and mobility  Long term goals  Time Frame for Long term goals : to be met 4/25/19  Long term goal 1: Pt will complete ADLs Mod I with AE as needed  Long term goal 2: Pt will complete all fucntional transfers Mod I  Long term goal 3: Pt will complete toileting Mod I  Long term goal 4: Pt will tolerate standing at sink for grooming tasks >5 min w/o fatigue or LOB   Long term goal 5: Pt will complete light home mgmt/meal prep tasks Mod I  Patient Goals   Patient goals : \"go home, take care of myself\"       Therapy Time   Individual Concurrent Group Co-treatment   Time In 1230         Time Out 1400         Minutes 90         Timed Code Treatment Minutes: Bobby Reyna OT

## 2019-04-16 NOTE — PROGRESS NOTES
UE sequence and placement to maintain precautions. Propulsion 1  Propulsion: Manual  Level: Level Tile  Method: RUE;LUE  Level of Assistance: Modified independent  Description/ Details: left right turns thru doorway  Distance: 150'   Gait in gym CGA  24 feet, 6 feet, 12 feet with FWW and cues for technique for advancing walker advancing RLE with BUE WB with RLE foot flat and then WB on UE and advancing LLE in step to fashion. PT follows with cues 50% of time without cues reverts to holding RLE  In SLR position in front of him NWB RLE. Cued for precautions  TDWB RLE with foot flat. Exercises  Heelslides: x30 LLE partial range as comfort determines  Gluteal Sets: x30 BLE   Knee Short Arc Quad: x30   Ankle Pumps: x30   SAQ x30  Second treatment   Nursing administreed pain meds with pt refusing cold pack but agrees to LE supported on pillow in bed at end of tx. Gait CGA in room 12 feet x2 with FWW and cues for technique for advancing walker advancing RLE with BUE WB with RLE foot flat and then WB on UE and advancing LLE in step to fashion. PT follows with cues 50% of time without cues reverts to holding RLE  In SLR position in front of him NWB RLE. Cued for precautions  TDWB RLE with foot flat. Sit>sup with min assist and cues for RLE lifting   Sit<>stand from [facility] and chair  And stand to sit at bed with FWW with cues for sequence appropriately for BUE and LE to minimize stress to acetabulum and for increased WB on BUE. Needed min assist sit<>stand at second tx with O2 sat 90-93% during second tx. Scooting in bed indep                           Assessment  Pt seen for split tx with FWW for bed mobility, endurance activity with LE therex and WC propulsion, gait train and transfer train with TDWB foot flat RLE  and cues for sequencing and as little as mod assist to lift/lower  And as little as min assist.  Pt Mandy with WC propulsion and walked up to 24 feet with FWW with CGA .   Pt with low SpO2 as noted prior to pt taking nebulizers with sPO2 90% or above during first and second tx thereafter. Pt given cues for LE therex and set up assist. Continue to progress with emphasis on indep with transfers and cues for safe technique. Activity Tolerance  Activity Tolerance: seaeted 84% sPO2 after transferring in bathroom. Pt recieved respiratory treatment during session  (inhalers) Immediate post treatment  with SpO2 94%, Hr 84bpm.           Goals  Short term goals  Time Frame for Short term goals: To be met by 4/18/19  Short term goal 1: Pt to perform supine to/from sit transfers at flat bed without rails with supervision. GOAL met 4/15/2019 Pt demo supervision/SBA for sup<>sit transfers. Short term goal 2: Pt to perform sit to/from stand at [facility] with CGA. Short term goal 3: Pt to ambulate 50 ft with LRAD with CGA for household mobility. Short term goal 4: Pt to propel wheelchair 50 ft on level surface with mod I for household mobility. GOAL MET 4/12/2019 Patient demo 205 feet SYL WC propulsion level and carpet with left/.right tuns and thru doorways  Short term goal 5: Pt to demonstrate car transfer with RW with mod A x 1. Long term goals  Time Frame for Long term goals : To be met by 4/25/19  Long term goal 1: Pt to perform supine to/from sit transfers at flat bed without rails with mod I.  Long term goal 2: Pt to perform sit to/from stand at [facility] with mod I.  Long term goal 3: Pt to ambulate 150 ft with LRAD with mod I for community mobility. Long term goal 4: Pt to manage 1 curb step with wheelchair or LRAD with mod I for home entry. Long term goal 5: Pt to demonstrate car transfer with RW with min A x 1.   Patient Goals   Patient goals : \"to be home and able to drive and do what I was doing\"    Plan    Plan  Times per week: 5/7 days week   Times per day: Daily  Current Treatment Recommendations: Strengthening, Functional Mobility Training, Wheelchair Mobility Training, Neuromuscular Re-education, Home Exercise

## 2019-04-16 NOTE — CARE COORDINATION
Team conference/Weekly Summary     Team conference held today. MARIBEL met with patient at floor bedside to provide team conference update and confirm anticipated dc date of April 26th with home health care, Ramp, wheelchair with swing back arms and elevated leg rest, Rolling Walker and Shower Chair. Explained that everything will need to be arranged through the [place]. He stated understanding. SW inquired if there was anyone that could assist him at home as he stated his son can't help him. He stated he doesn't know if this could be possible but would think about it. He stated writer could call Lynne Boudreaux to update her on the above. He stated this is where Mor , his son is staying. Placed call to Lynne Boudreaux, patient's friend and left a message asking for a call back. Placed call to patient's [place] PCP office at [phone] and spoke with Kim Alicea; was transferred to his care manager and left a message asking for a call back. (ext [phone])    Addendum at 3:17pm: Received call back from Lynne Boudreaux who was updated on the above. She stated that \"extra help\" at home with be tough as his son is \"pretty independent\" but needs to be tucked in a night which is on the second floor and has a routine; there are about 14 steps to the upstairs. She would like patient to not discharge at a Wheelchair level as this will \"freak\" Mor  out. Lynne Boudreaux stated she is a speech therapist and is aware that weight bearing status may play a factor into this. Explained writer will talk to the treatment team. Lynne Boudreaux stated she would talk to the patient when she gets back from vacation. If anything is needed in the meantime, her  Chaya Ramon can reached at [phone]    At 3:30pm: Received call from Onur Caldwell at the [place] who was updated on the above. She stated she would send a message to Phillip Wesley, patient's care manager. Onur Caldwell said patient's last four is [phone].      Phillip Wesley can be reached at ext [facility]

## 2019-04-17 PROCEDURE — 6370000000 HC RX 637 (ALT 250 FOR IP): Performed by: PHYSICAL MEDICINE & REHABILITATION

## 2019-04-17 PROCEDURE — 97530 THERAPEUTIC ACTIVITIES: CPT

## 2019-04-17 PROCEDURE — 97535 SELF CARE MNGMENT TRAINING: CPT

## 2019-04-17 PROCEDURE — 97110 THERAPEUTIC EXERCISES: CPT

## 2019-04-17 PROCEDURE — 1280000000 HC REHAB R&B

## 2019-04-17 PROCEDURE — 97116 GAIT TRAINING THERAPY: CPT

## 2019-04-17 PROCEDURE — 94640 AIRWAY INHALATION TREATMENT: CPT

## 2019-04-17 PROCEDURE — 6360000002 HC RX W HCPCS: Performed by: PHYSICAL MEDICINE & REHABILITATION

## 2019-04-17 RX ADMIN — ENOXAPARIN SODIUM 40 MG: 40 INJECTION SUBCUTANEOUS at 07:41

## 2019-04-17 RX ADMIN — GABAPENTIN 1200 MG: 300 CAPSULE ORAL at 21:20

## 2019-04-17 RX ADMIN — TAMSULOSIN HYDROCHLORIDE 0.4 MG: 0.4 CAPSULE ORAL at 07:41

## 2019-04-17 RX ADMIN — ASPIRIN 81 MG 81 MG: 81 TABLET ORAL at 07:41

## 2019-04-17 RX ADMIN — LORAZEPAM 1 MG: 1 TABLET ORAL at 21:20

## 2019-04-17 RX ADMIN — BACLOFEN 10 MG: 10 TABLET ORAL at 14:42

## 2019-04-17 RX ADMIN — TIOTROPIUM BROMIDE 18 MCG: 18 CAPSULE ORAL; RESPIRATORY (INHALATION) at 07:39

## 2019-04-17 RX ADMIN — GABAPENTIN 1200 MG: 300 CAPSULE ORAL at 07:41

## 2019-04-17 RX ADMIN — BACLOFEN 10 MG: 10 TABLET ORAL at 07:41

## 2019-04-17 RX ADMIN — SENNOSIDES AND DOCUSATE SODIUM 1 TABLET: 8.6; 5 TABLET ORAL at 21:20

## 2019-04-17 RX ADMIN — MAGNESIUM GLUCONATE 500 MG ORAL TABLET 400 MG: 500 TABLET ORAL at 07:42

## 2019-04-17 RX ADMIN — VITAMIN D, TAB 1000IU (100/BT) 1000 UNITS: 25 TAB at 07:42

## 2019-04-17 RX ADMIN — GABAPENTIN 1200 MG: 300 CAPSULE ORAL at 14:42

## 2019-04-17 RX ADMIN — PRAVASTATIN SODIUM 40 MG: 40 TABLET ORAL at 21:20

## 2019-04-17 RX ADMIN — AMLODIPINE BESYLATE 5 MG: 5 TABLET ORAL at 07:41

## 2019-04-17 RX ADMIN — OXYCODONE HYDROCHLORIDE 10 MG: 5 TABLET ORAL at 13:22

## 2019-04-17 RX ADMIN — DIPHENHYDRAMINE HCL 25 MG: 25 TABLET ORAL at 21:20

## 2019-04-17 RX ADMIN — Medication 2 PUFF: at 07:39

## 2019-04-17 RX ADMIN — Medication 2 PUFF: at 21:16

## 2019-04-17 RX ADMIN — PANTOPRAZOLE SODIUM 40 MG: 40 TABLET, DELAYED RELEASE ORAL at 06:29

## 2019-04-17 RX ADMIN — GUAIFENESIN 600 MG: 600 TABLET, EXTENDED RELEASE ORAL at 07:41

## 2019-04-17 RX ADMIN — OXYCODONE HYDROCHLORIDE 10 MG: 5 TABLET ORAL at 08:08

## 2019-04-17 RX ADMIN — GUAIFENESIN 600 MG: 600 TABLET, EXTENDED RELEASE ORAL at 16:59

## 2019-04-17 RX ADMIN — OXYCODONE HYDROCHLORIDE 10 MG: 5 TABLET ORAL at 21:20

## 2019-04-17 RX ADMIN — SENNOSIDES AND DOCUSATE SODIUM 1 TABLET: 8.6; 5 TABLET ORAL at 07:41

## 2019-04-17 RX ADMIN — BACLOFEN 10 MG: 10 TABLET ORAL at 21:20

## 2019-04-17 ASSESSMENT — PAIN DESCRIPTION - PROGRESSION: CLINICAL_PROGRESSION: NOT CHANGED

## 2019-04-17 ASSESSMENT — PAIN DESCRIPTION - LOCATION
LOCATION: GROIN
LOCATION: PELVIS;GROIN
LOCATION: HIP;PELVIS

## 2019-04-17 ASSESSMENT — PAIN SCALES - GENERAL
PAINLEVEL_OUTOF10: 4
PAINLEVEL_OUTOF10: 6
PAINLEVEL_OUTOF10: 8
PAINLEVEL_OUTOF10: 2
PAINLEVEL_OUTOF10: 8
PAINLEVEL_OUTOF10: 4
PAINLEVEL_OUTOF10: 2
PAINLEVEL_OUTOF10: 9
PAINLEVEL_OUTOF10: 8
PAINLEVEL_OUTOF10: 8

## 2019-04-17 ASSESSMENT — PAIN DESCRIPTION - PAIN TYPE
TYPE: ACUTE PAIN

## 2019-04-17 ASSESSMENT — PAIN DESCRIPTION - ORIENTATION
ORIENTATION: RIGHT;LEFT
ORIENTATION: RIGHT
ORIENTATION: RIGHT;LEFT

## 2019-04-17 ASSESSMENT — PAIN - FUNCTIONAL ASSESSMENT
PAIN_FUNCTIONAL_ASSESSMENT: PREVENTS OR INTERFERES WITH ALL ACTIVE AND SOME PASSIVE ACTIVITIES
PAIN_FUNCTIONAL_ASSESSMENT: PREVENTS OR INTERFERES SOME ACTIVE ACTIVITIES AND ADLS

## 2019-04-17 ASSESSMENT — PAIN DESCRIPTION - DESCRIPTORS: DESCRIPTORS: ACHING;DISCOMFORT

## 2019-04-17 ASSESSMENT — PAIN DESCRIPTION - DIRECTION: RADIATING_TOWARDS: RIGHT TO LEFT

## 2019-04-17 ASSESSMENT — PAIN DESCRIPTION - FREQUENCY: FREQUENCY: CONTINUOUS

## 2019-04-17 ASSESSMENT — PAIN DESCRIPTION - ONSET: ONSET: ON-GOING

## 2019-04-17 NOTE — PROGRESS NOTES
Physical Therapy  Facility/Department: [facility]  Daily Treatment Note  NAME: Kevin Baker  : 1945  MRN: [id number]    Date of Service: 2019    Discharge Recommendations:  Continue to assess pending progress        Patient Diagnosis(es): There were no encounter diagnoses. has a past medical history of Allergic rhinitis, Anxiety, COPD (chronic obstructive pulmonary disease) ([facility] [address] Emphysema of lung ([facility] [address] GERD (gastroesophageal reflux disease), Hypercholesteremia, Hypertension, Lumbar radiculopathy, and PTSD (post-traumatic stress disorder). has no past surgical history on file. Restrictions  Lower Extremity Weight Bearing Restrictions  Right Lower Extremity Weight Bearing: Flat Foot Weight Bearing(toe touch)  Left Lower Extremity Weight Bearing: Weight Bearing As Tolerated  Position Activity Restriction  Other position/activity restrictions: flat foot Toe touch weightbearing on RLE, weightbearing as tolerated on LLE. Notify physician for pulse less than 50 or greater than 120, respiratory rate less than 12 or greater than 25,systolic BP less than 90 or greater than 857, diastolic BP less than 50 or greater than 100. Subjective  Pt reported pain  From 5-8.5/10 in groin throughout session less pain at rest and increased pain with activity . PT given cold pack for pain and repositioned as needed. Pt refused to don brief, underwear or soft pants in am stating he couldn't get to urinal fast enough with the clothing on. PT agrees to hospital pants only. During last session pt encouraged to wear and pt agreed to wear hospital gown over pants as pt unable to keep[  Pants up in standing  without intermittent assist.  General  Chart Reviewed:  Yes  Additional Pertinent Hx: s/p fall resulting in pelvic and sacral fractures (non-operative treatment)  Family / Caregiver Present: No  Referring Practitioner: Irean Cranker, MD  Subjective  Subjective: Pt agreeable to treatment, \"Patient   General sequence WB on UE and TDWB RLE  With foot flat   Sit<>stand WC with FWW SBA to CGA   With cues for WB and sequence 2/2 trials on 1 trial pt with mild lob and needed assist to manage pants as pt stpped on pant leg and pulled pants down. Sit<>sup cues/Supervision  for managing RLE with RUE  SupineLE therex with cues: Ankle PF resisted red band Left  and no resisted Right x30 BLE   SAQ x30  Heel slides x30 LLE  2x10 RLE partial range with pillow case on foot second set with increased range noted   WC propulsion 150 feet  SYL BUE   Gait in therapy gym 18 feet x2 with CGA cues for gait and demo to patient appropriate gait. Third Session:  Gait: Pt given cues with gait training with CGA  FWW cued across sessions to advance walker WB heavily on U, advance RLE forward TDWB foot flat, push heavily thru BUE and step to gait with LLE equal to RLE. Pt cued not to have feet past walker cross bar as pt engaging in this 50A% of the time unless cued. After walking 11 feet  SpO2 RA 88% HR 103bpm after 1 min plb SpO2 91%  bpm        After second bout of walking 11 feet  SpO2 RA 87% HR 97bpm after 1 min plb SpO2 90% HR 89 bpm   Transfer sit<>stand from chair/WC x2 each with FWW with CGA and cues for sequence with pt employing correct technique with cues. Patient tends to be impulsive and requires frequent cues for safety with transfer. Explained to pt reason for technique based on Fx location with pt verbalizing understanding. Assessment  Patient seen for 3 split PT sessions this am for [facility] endurance/UE strengthen for gait with FWW, transfer train, gait train and LE strengthen with education in mobility techniques and recommendation of ramp for home. .  PT eating breakfast during start of scheduled session, receiving meds and receiving respiratory tx. PT with pain from 5-8.5/10 in groin increased with activity. Pt continues to need CGA with gait and transfers level with FWW.  Pt given cues with gait training with CGA  FWW cued across sessions to advance walker WB heavily on UE, advance RLE forward TDWB foot flat, push heavily thru BUE and step to gait with LLE equal to RLE. Pt cued not to have feet past walker cross bar as pt engaging in this 50A% of the time unless cued. PT CGA for sit<>stand and gait  with cues for sequence. Supervision today with cues for LE management for sup<>sit. PT will need ramp for  1 step entry to home. Recommended pt to have ramp installed at entry to  Home. Goals  Short term goals  Time Frame for Short term goals: To be met by 4/18/19  Short term goal 1: Pt to perform supine to/from sit transfers at flat bed without rails with supervision. GOAL met 4/15/2019 Pt demo supervision/SBA for sup<>sit transfers. Short term goal 2: Pt to perform sit to/from stand at [facility] with CGA. GOAL met 4/17/2019 CGA with cues for sit<>stand with FWW from mat/chair/WC. Short term goal 3: Pt to ambulate 50 ft with LRAD with CGA for household mobility. Short term goal 4: Pt to propel wheelchair 50 ft on level surface with mod I for household mobility. GOAL MET 4/12/2019 Patient demo 205 feet SYL WC propulsion level and carpet with left/.right tuns and thru doorways  Short term goal 5: Pt to demonstrate car transfer with RW with mod A x 1. Long term goals  Time Frame for Long term goals : To be met by 4/25/19  Long term goal 1: Pt to perform supine to/from sit transfers at flat bed without rails with mod I.  Long term goal 2: Pt to perform sit to/from stand at [facility] with mod I.  Long term goal 3: Pt to ambulate 150 ft with LRAD with mod I for community mobility. Long term goal 4: Pt to manage 1 curb step with wheelchair or LRAD with mod I for home entry. Long term goal 5: Pt to demonstrate car transfer with RW with min A x 1.   Patient Goals   Patient goals : \"to be home and able to drive and do what I was doing\"    Plan    Plan  Times per week: 5/7 days week   Times per day: Daily  Current Treatment Recommendations: Strengthening, Functional Mobility Training, Wheelchair Mobility Training, Neuromuscular Re-education, Home Exercise Program, Equipment Evaluation, Education, & procurement, Safety Education & Training, Gait Training, Transfer Training, ROM, Balance Training, Endurance Training, Stair training, Pain Management, Patient/Caregiver Education & Training, Positioning  Safety Devices  Type of devices:  All fall risk precautions in place, Chair alarm in place, Left in chair, Call light within reach  Restraints  Initially in place: No     Therapy Time   Individual Concurrent Group Co-treatment   Time In 0745         Time Out 0830         Minutes 45         Timed Code Treatment Minutes: [address] Time:   Individual Concurrent Group Co-treatment   Time In 0930         Time Out 1005         Minutes 35           Timed Code Treatment Minutes:  [address] Time:   [address] [facility]   Time In [address] [facility]         Minutes 10           Timed Code Treatment Minutes:  10    Total Treatment Minutes:  90      Total Treatment Minutes:        Valencia Phalen, PT

## 2019-04-17 NOTE — PROGRESS NOTES
Occupational Therapy  [facility]  Daily Treatment Note  NAME: Peewee Prather  : 1945  MRN: [id number]    Date of Service: 2019    Discharge Recommendations:  Home with Home health OT, Home with assist PRN  OT Equipment Recommendations  Other: will likely need shower chair, possible RTS as needed    Assessment   Performance deficits / Impairments: Decreased functional mobility ; Decreased ADL status; Decreased strength;Decreased safe awareness;Decreased endurance;Decreased balance  Assessment: Pt pleasant and cooperative, completes transfers with CGA/min A and increased time. Pt able to complete grooming sitting at sink, toileting with increased time to complete. Pt tolerates B UE ex with 3# weights, to restroom multiple times during session. Patient Education: OT role, ADLs, transfers, WB status, AE for LB ADls  REQUIRES OT FOLLOW UP: Yes  Activity Tolerance  Activity Tolerance: Patient Tolerated treatment well;Patient limited by pain  Safety Devices  Safety Devices in place: Yes  Type of devices: Bed alarm in place;Call light within reach;Gait belt;Left in bed         Patient Diagnosis(es): There were no encounter diagnoses. has a past medical history of Allergic rhinitis, Anxiety, COPD (chronic obstructive pulmonary disease) ([facility] [address] Emphysema of lung ([facility] [address] GERD (gastroesophageal reflux disease), Hypercholesteremia, Hypertension, Lumbar radiculopathy, and PTSD (post-traumatic stress disorder). has no past surgical history on file. Restrictions  Lower Extremity Weight Bearing Restrictions  Right Lower Extremity Weight Bearing: Flat Foot Weight Bearing(toe touch)  Left Lower Extremity Weight Bearing: Weight Bearing As Tolerated  Position Activity Restriction  Other position/activity restrictions: flat foot Toe touch weightbearing on RLE, weightbearing as tolerated on LLE.   Notify physician for pulse less than 50 or greater than 120, respiratory rate less than 12 or greater than 25,systolic BP less than 90 or greater than 622, diastolic BP less than 50 or greater than 100. Subjective   General  Chart Reviewed: Yes  Patient assessed for rehabilitation services?: Yes  Additional Pertinent Hx: anxiety, COPD, emphysema, GERD, HTN, PTSD, lumbar radiculopathy, pelvic fx, sacral fx, fracture of R hip, pubic rami fx, laryngeal CA s/p sx, RCC s/p partial nephrectomy  Family / Caregiver Present: No  Referring Practitioner: Augustin Larios MD  Diagnosis: fall, s/p multiple fractures  Subjective  Subjective: Pt in w/c, agreeable  General Comment  Comments: RN cleared for treatment  Pain Assessment  Pain Level: 8  Pain Type: Acute pain  Pain Location: Pelvis;Groin  Pain Orientation: Right  Vital Signs  Patient Currently in Pain: Yes   Orientation  Orientation  Overall Orientation Status: Within Functional Limits  Objective    ADL  Feeding: Independent  Grooming: Setup; Increased time to complete  Toileting: Minimal assistance(clothing mgmt)        Balance  Sitting Balance: Supervision  Standing Balance: Contact guard assistance  Standing Balance  Time: <1 min x 4  Activity: transfers  Sit to stand: Contact guard assistance  Stand to sit: Contact guard assistance  Comment: grab bars  Functional Mobility  Functional - Mobility Device: Wheelchair  Activity: To/from bathroom; Other  Assist Level: Supervision  Toilet Transfers  Toilet - Technique: Stand pivot  Equipment Used: Standard toilet(grab bars)  Toilet Transfer: Contact guard assistance     Transfers  Sit to stand: Contact guard assistance  Stand to sit: Contact guard assistance                       Cognition  Overall Cognitive Status: Exceptions  Arousal/Alertness: Appropriate responses to stimuli  Following Commands: Follows one step commands with repetition; Follows one step commands with increased time  Attention Span: Attends with cues to redirect  Memory: Decreased short term memory  Safety Judgement: Decreased awareness of need for assistance  Problem Solving: Assistance required to identify errors made  Insights: Decreased awareness of deficits  Initiation: Requires cues for some  Sequencing: Requires cues for some  Cognition Comment: pt very talkative, needs redirection throughout session                    Type of ROM/Therapeutic Exercise  Type of ROM/Therapeutic Exercise: AROM; Free weights  Comment: 3#  Exercises  Scapular Protraction: 2 sets x15  Scapular Retraction: 2 sets x15  Shoulder Flexion: 2 sets x15  Shoulder ABduction: 2 sets x15  Shoulder ADduction: 2 sets x15  Horizontal ABduction: 2 sets x15  Horizontal ADduction: 2 sets x15  Elbow Flexion: 2 sets x15  Supination: 2 sets x15  Pronation: 2 sets x15  Wrist Flexion: 2 sets x15  Wrist Extension: 2 sets x15                    Plan   Plan  Times per week: 5/7 days/wk  Current Treatment Recommendations: Strengthening, Balance Training, Functional Mobility Training, Endurance Training, Patient/Caregiver Education & Training, Equipment Evaluation, Education, & procurement, Self-Care / ADL    Goals  Short term goals  Time Frame for Short term goals: to be met by 4/18/19  Short term goal 1: Pt will complete LB ADLs with min A with AE as needed  Short term goal 2: Pt will complete functional transfers with Min A-met 4/15  Short term goal 3: Pt will complete toileting with min A or less-met 4/17  Short term goal 4: pt will adhere to WB status with all transfers and mobility  Long term goals  Time Frame for Long term goals : to be met 4/25/19  Long term goal 1: Pt will complete ADLs Mod I with AE as needed  Long term goal 2: Pt will complete all fucntional transfers Mod I  Long term goal 3: Pt will complete toileting Mod I  Long term goal 4: Pt will tolerate standing at sink for grooming tasks >5 min w/o fatigue or LOB   Long term goal 5: Pt will complete light home mgmt/meal prep tasks Mod I  Patient Goals   Patient goals : \"go home, take care of myself\"        Therapy Time   Individual

## 2019-04-17 NOTE — CARE COORDINATION
Spoke with Kayode Zaidi OT who requested writer speak with aMrk PT on concerns expressed from Leon Yuri, patient's friend on 4-16-19. Will discuss with Mark before talking to patient about possible SNF's as Leon Aminahjohn stated patient ideally needs to be not at a wheelchair level when he discharges.      Terrance Jason MSW, LSW

## 2019-04-17 NOTE — PROGRESS NOTES
[facility]  4/17/2019  [id number]    Chief Complaint: Debility    Subjective:   No acute events overnight. No new c/o. Therapy going well. ROS: No cp, sob, n/v  Objective:  Patient Vitals for the past 24 hrs:   BP Temp Temp src Pulse Resp SpO2 Weight   04/17/19 0745 121/66 98.2 °F (36.8 °C) Oral 89 18 95 % --   04/17/19 0740 -- -- -- -- -- 93 % --   04/17/19 0620 -- -- -- -- -- -- 181 lb 10.5 oz (82.4 kg)   04/16/19 2143 -- -- -- -- 18 92 % --   04/16/19 2100 113/65 98.2 °F (36.8 °C) Oral 94 19 91 % --     Gen: No distress, pleasant. HEENT: Normocephalic, atraumatic. CV: Regular rate and rhythm. Resp: No respiratory distress. Abd: Soft, nontender   Ext: No edema. Neuro: Alert, oriented, appropriately interactive. Wt Readings from Last 3 Encounters:   04/17/19 181 lb 10.5 oz (82.4 kg)       Laboratory data:   Lab Results   Component Value Date    WBC 10.1 04/15/2019    HGB 11.7 (L) 04/15/2019    HCT 33.9 (L) 04/15/2019    MCV 92.0 04/15/2019     04/15/2019       Lab Results   Component Value Date     04/15/2019    K 4.5 04/15/2019    CL 97 04/15/2019    CO2 30 04/15/2019    BUN 15 04/15/2019    CREATININE 0.9 04/15/2019    GLUCOSE 113 04/15/2019    CALCIUM 8.4 04/15/2019        Therapy progress:  PT  Position Activity Restriction  Other position/activity restrictions: flat foot Toe touch weightbearing on RLE, weightbearing as tolerated on LLE. Notify physician for pulse less than 50 or greater than 120, respiratory rate less than 12 or greater than 25,systolic BP less than 90 or greater than 971, diastolic BP less than 50 or greater than 100. Objective     Sit to Stand: Contact guard assistance, Stand by assistance  Stand to sit: Moderate Assistance, Contact guard assistance  Bed to Chair: Moderate assistance, Maximum assistance(Mod sit to stand and mod>max stand to sit )  Stand Pivot Transfers:  Moderate Assistance  Device: Rolling Walker  Assistance: Contact guard assistance  Distance:

## 2019-04-17 NOTE — PROGRESS NOTES
Writer called  Ortho, spoke to Tariq Martinez explained the appointment is tomorrow 4/18/19 and we could not get transportation set up that quickly. Dorothea cancelled appt. And stated patient can call post rehab if needed.

## 2019-04-18 LAB
ANION GAP SERPL CALCULATED.3IONS-SCNC: 12 MMOL/L (ref 3–16)
BASOPHILS ABSOLUTE: 0.1 K/UL (ref 0–0.2)
BASOPHILS RELATIVE PERCENT: 0.8 %
BUN BLDV-MCNC: 21 MG/DL (ref 7–20)
CALCIUM SERPL-MCNC: 8.6 MG/DL (ref 8.3–10.6)
CHLORIDE BLD-SCNC: 99 MMOL/L (ref 99–110)
CO2: 28 MMOL/L (ref 21–32)
CREAT SERPL-MCNC: 1 MG/DL (ref 0.8–1.3)
EOSINOPHILS ABSOLUTE: 0.3 K/UL (ref 0–0.6)
EOSINOPHILS RELATIVE PERCENT: 3.2 %
GFR AFRICAN AMERICAN: >60
GFR NON-AFRICAN AMERICAN: >60
GLUCOSE BLD-MCNC: 105 MG/DL (ref 70–99)
HCT VFR BLD CALC: 34.9 % (ref 40.5–52.5)
HEMOGLOBIN: 12 G/DL (ref 13.5–17.5)
LYMPHOCYTES ABSOLUTE: 1 K/UL (ref 1–5.1)
LYMPHOCYTES RELATIVE PERCENT: 9.6 %
MCH RBC QN AUTO: 31.6 PG (ref 26–34)
MCHC RBC AUTO-ENTMCNC: 34.3 G/DL (ref 31–36)
MCV RBC AUTO: 92.3 FL (ref 80–100)
MONOCYTES ABSOLUTE: 0.8 K/UL (ref 0–1.3)
MONOCYTES RELATIVE PERCENT: 7.7 %
NEUTROPHILS ABSOLUTE: 8.1 K/UL (ref 1.7–7.7)
NEUTROPHILS RELATIVE PERCENT: 78.7 %
PDW BLD-RTO: 13.6 % (ref 12.4–15.4)
PLATELET # BLD: 511 K/UL (ref 135–450)
PMV BLD AUTO: 7 FL (ref 5–10.5)
POTASSIUM REFLEX MAGNESIUM: 4.7 MMOL/L (ref 3.5–5.1)
RBC # BLD: 3.78 M/UL (ref 4.2–5.9)
SODIUM BLD-SCNC: 139 MMOL/L (ref 136–145)
WBC # BLD: 10.2 K/UL (ref 4–11)

## 2019-04-18 PROCEDURE — 97116 GAIT TRAINING THERAPY: CPT

## 2019-04-18 PROCEDURE — 1280000000 HC REHAB R&B

## 2019-04-18 PROCEDURE — 6370000000 HC RX 637 (ALT 250 FOR IP): Performed by: PHYSICAL MEDICINE & REHABILITATION

## 2019-04-18 PROCEDURE — 97110 THERAPEUTIC EXERCISES: CPT

## 2019-04-18 PROCEDURE — 36415 COLL VENOUS BLD VENIPUNCTURE: CPT

## 2019-04-18 PROCEDURE — 94640 AIRWAY INHALATION TREATMENT: CPT

## 2019-04-18 PROCEDURE — 85025 COMPLETE CBC W/AUTO DIFF WBC: CPT

## 2019-04-18 PROCEDURE — 6360000002 HC RX W HCPCS: Performed by: PHYSICAL MEDICINE & REHABILITATION

## 2019-04-18 PROCEDURE — 97535 SELF CARE MNGMENT TRAINING: CPT

## 2019-04-18 PROCEDURE — 80048 BASIC METABOLIC PNL TOTAL CA: CPT

## 2019-04-18 PROCEDURE — 97530 THERAPEUTIC ACTIVITIES: CPT

## 2019-04-18 RX ORDER — CLOBETASOL PROPIONATE 0.5 MG/G
OINTMENT TOPICAL 2 TIMES DAILY PRN
Status: DISCONTINUED | OUTPATIENT
Start: 2019-04-18 | End: 2019-05-02 | Stop reason: HOSPADM

## 2019-04-18 RX ADMIN — AMLODIPINE BESYLATE 5 MG: 5 TABLET ORAL at 09:16

## 2019-04-18 RX ADMIN — OXYCODONE HYDROCHLORIDE 10 MG: 5 TABLET ORAL at 07:11

## 2019-04-18 RX ADMIN — SENNOSIDES AND DOCUSATE SODIUM 1 TABLET: 8.6; 5 TABLET ORAL at 09:16

## 2019-04-18 RX ADMIN — GUAIFENESIN 600 MG: 600 TABLET, EXTENDED RELEASE ORAL at 09:16

## 2019-04-18 RX ADMIN — ASPIRIN 81 MG 81 MG: 81 TABLET ORAL at 09:16

## 2019-04-18 RX ADMIN — ACETAMINOPHEN 650 MG: 325 TABLET ORAL at 16:53

## 2019-04-18 RX ADMIN — VITAMIN D, TAB 1000IU (100/BT) 1000 UNITS: 25 TAB at 09:16

## 2019-04-18 RX ADMIN — DIPHENHYDRAMINE HCL 25 MG: 25 TABLET ORAL at 21:26

## 2019-04-18 RX ADMIN — ACETAMINOPHEN 650 MG: 325 TABLET ORAL at 07:11

## 2019-04-18 RX ADMIN — MAGNESIUM HYDROXIDE 30 ML: 400 SUSPENSION ORAL at 09:25

## 2019-04-18 RX ADMIN — TAMSULOSIN HYDROCHLORIDE 0.4 MG: 0.4 CAPSULE ORAL at 09:16

## 2019-04-18 RX ADMIN — PRAVASTATIN SODIUM 40 MG: 40 TABLET ORAL at 21:27

## 2019-04-18 RX ADMIN — BACLOFEN 10 MG: 10 TABLET ORAL at 09:16

## 2019-04-18 RX ADMIN — GABAPENTIN 1200 MG: 300 CAPSULE ORAL at 09:16

## 2019-04-18 RX ADMIN — BACLOFEN 10 MG: 10 TABLET ORAL at 21:27

## 2019-04-18 RX ADMIN — Medication 2 PUFF: at 20:34

## 2019-04-18 RX ADMIN — OXYCODONE HYDROCHLORIDE 10 MG: 5 TABLET ORAL at 21:26

## 2019-04-18 RX ADMIN — OXYCODONE HYDROCHLORIDE 10 MG: 5 TABLET ORAL at 16:53

## 2019-04-18 RX ADMIN — GABAPENTIN 1200 MG: 300 CAPSULE ORAL at 21:26

## 2019-04-18 RX ADMIN — ENOXAPARIN SODIUM 40 MG: 40 INJECTION SUBCUTANEOUS at 09:16

## 2019-04-18 RX ADMIN — TIOTROPIUM BROMIDE 18 MCG: 18 CAPSULE ORAL; RESPIRATORY (INHALATION) at 07:42

## 2019-04-18 RX ADMIN — SENNOSIDES AND DOCUSATE SODIUM 1 TABLET: 8.6; 5 TABLET ORAL at 21:27

## 2019-04-18 RX ADMIN — GABAPENTIN 1200 MG: 300 CAPSULE ORAL at 12:29

## 2019-04-18 RX ADMIN — BACLOFEN 10 MG: 10 TABLET ORAL at 12:29

## 2019-04-18 RX ADMIN — GUAIFENESIN 600 MG: 600 TABLET, EXTENDED RELEASE ORAL at 16:53

## 2019-04-18 RX ADMIN — Medication 2 PUFF: at 07:42

## 2019-04-18 RX ADMIN — OXYCODONE HYDROCHLORIDE 10 MG: 5 TABLET ORAL at 12:28

## 2019-04-18 RX ADMIN — MAGNESIUM GLUCONATE 500 MG ORAL TABLET 400 MG: 500 TABLET ORAL at 09:16

## 2019-04-18 RX ADMIN — PANTOPRAZOLE SODIUM 40 MG: 40 TABLET, DELAYED RELEASE ORAL at 06:02

## 2019-04-18 ASSESSMENT — PAIN SCALES - GENERAL
PAINLEVEL_OUTOF10: 7
PAINLEVEL_OUTOF10: 8
PAINLEVEL_OUTOF10: 7
PAINLEVEL_OUTOF10: 6
PAINLEVEL_OUTOF10: 7
PAINLEVEL_OUTOF10: 6
PAINLEVEL_OUTOF10: 7
PAINLEVEL_OUTOF10: 7
PAINLEVEL_OUTOF10: 6
PAINLEVEL_OUTOF10: 8
PAINLEVEL_OUTOF10: 7

## 2019-04-18 ASSESSMENT — PAIN DESCRIPTION - FREQUENCY: FREQUENCY: CONTINUOUS

## 2019-04-18 ASSESSMENT — PAIN DESCRIPTION - PROGRESSION
CLINICAL_PROGRESSION: NOT CHANGED
CLINICAL_PROGRESSION: NOT CHANGED

## 2019-04-18 ASSESSMENT — PAIN DESCRIPTION - ORIENTATION
ORIENTATION: RIGHT

## 2019-04-18 ASSESSMENT — PAIN DESCRIPTION - LOCATION
LOCATION: HIP;GROIN

## 2019-04-18 ASSESSMENT — PAIN DESCRIPTION - PAIN TYPE
TYPE: ACUTE PAIN
TYPE: ACUTE PAIN

## 2019-04-18 ASSESSMENT — PAIN - FUNCTIONAL ASSESSMENT
PAIN_FUNCTIONAL_ASSESSMENT: PREVENTS OR INTERFERES SOME ACTIVE ACTIVITIES AND ADLS
PAIN_FUNCTIONAL_ASSESSMENT: PREVENTS OR INTERFERES SOME ACTIVE ACTIVITIES AND ADLS

## 2019-04-18 ASSESSMENT — PAIN DESCRIPTION - ONSET: ONSET: ON-GOING

## 2019-04-18 ASSESSMENT — PAIN DESCRIPTION - DESCRIPTORS: DESCRIPTORS: ACHING;RADIATING

## 2019-04-18 NOTE — PLAN OF CARE
Pain is currently being managed with PO medications, see MAR. Staff assist with repositioning when needed and pillow support is provided for comfort. Patient is satisfied with current pain interventions.

## 2019-04-18 NOTE — PROGRESS NOTES
Physical Therapy  Facility/Department: [facility]  Daily Treatment Note  NAME: Louis Salazar  : 1945  MRN: [id number]    Date of Service: 2019    Discharge Recommendations:  Continue to assess pending progress        Patient Diagnosis(es): There were no encounter diagnoses. has a past medical history of Allergic rhinitis, Anxiety, COPD (chronic obstructive pulmonary disease) ([facility] [address] Emphysema of lung ([facility] [address] GERD (gastroesophageal reflux disease), Hypercholesteremia, Hypertension, Lumbar radiculopathy, and PTSD (post-traumatic stress disorder). has no past surgical history on file. Restrictions  Lower Extremity Weight Bearing Restrictions  Right Lower Extremity Weight Bearing: Flat Foot Weight Bearing(toe touch)  Left Lower Extremity Weight Bearing: Weight Bearing As Tolerated  Position Activity Restriction  Other position/activity restrictions: flat foot Toe touch weightbearing on RLE, weightbearing as tolerated on LLE. Notify physician for pulse less than 50 or greater than 120, respiratory rate less than 12 or greater than 25,systolic BP less than 90 or greater than 591, diastolic BP less than 50 or greater than 100. Subjective   General  Chart Reviewed: Yes  Additional Pertinent Hx: s/p fall resulting in pelvic and sacral fractures (non-operative treatment)  Family / Caregiver Present: No  Referring Practitioner: Sree Urbano MD  Subjective  Subjective: PT reports 7/10 pain groin and right thigh per pt with pt reporting deep bruise on thigh since yesterday.    General Comment  Comments: Pt finishing breakfast.   Pain Screening  Patient Currently in Pain: Yes  Pain Assessment  Pain Assessment: 0-10  Pain Level: 7  Pain Type: Acute pain  Pain Location: Hip;Groin  Pain Orientation: Right  Pain Descriptors: Aching;Radiating  Pain Frequency: Continuous  Pain Onset: On-going  Clinical Progression: Not changed  Functional Pain Assessment: Prevents or interferes some active activities and ADLs  Non-Pharmaceutical Pain Intervention(s): Repositioned; Emotional support  Vital Signs  Pulse: 81  Heart Rate Source: Monitor  BP Location: Right upper arm  BP Upper/Lower: Upper  Patient Position: Semi fowlers  Level of Consciousness: Alert  Patient Currently in Pain: Yes  Oxygen Therapy  SpO2: 90 %(88% before 1 minute of PLB )  Pulse Oximeter Device Mode: Intermittent  Pulse Oximeter Device Location: Right;Finger  O2 Device: None (Room air)       Orientation  Orientation  Overall Orientation Status: Within Normal Limits  Cognition      Objective   Bed mobility  Supine to Sit: Independent  Transfers  Sit to Stand: Contact guard assistance from bed with FWW x1, WC x5  Stand to sit: Moderate Assistance;Contact guard assistance FWW WC x5  Bed to Chair: Contact guard assistance FWW  Ambulation  Ambulation?: Yes  WB Status: Foot flat touch down weight bearing RLE, WBAT LLE  More Ambulation?: Yes  Ambulation 1  Surface: level tile  Device: Rolling Walker  Assistance: Contact guard assistance  Quality of Gait: Needed cues for foot flat TDWB RLE with pt cued for advance RLE and then L but pt continues to advance BLE at same time with NWB RLE   Distance: 16', 18', 18'  Ambulation 2  Device 2: Parallel Bars  Assistance 2: Contact guard assistance  Quality of Gait 2: step to gait with cues for RLE advance with foot flat TDWB, Pt needs frequent cues to sequence  after demo by PT   Distance: 12', 12'  Comments: needed seated rest after each bout with SpO2 between 87% to89% immediate post walk with PLB SpO2 90% or greater. WC propulsion 150 feet x2 with left/right turns indep. Exercises  Knee Long Arc Quad: x30 BLE                         Assessment   Body structures, Functions, Activity limitations: Decreased functional mobility ; Decreased strength;Decreased endurance;Decreased safe awareness;Decreased ROM; Decreased balance; Increased Pain  Assessment: Pt seen for gait train, transfer train, LE therex, bed mobility and WC propulsion endurance. Pt seen this day for gait with FWW and in parallel bars with pt in parallel bars with cues with greater success for  TDWB  foot flat vs hopping on LLE in FWW. Observed reported ecchymosis right posterior hip and thigh with nursing notified. PT with bed>WC CGA with sup to sit indep and bed>chair with FWW  CGA. Pt with multiple sit to stand transfers with inermittent cues needed for appropriate positioning of RLE  and UE with pt with LOB x1 across trials with stand to sit with FWW to chair requiring mod A. All sit to stand with cues with FWW CGA. PT MOID for WC propulsion ramp  to parallel bars and level 150 feetx2. Or sat monitored post activity on RA with range from 87% to 89% immediate post activity with pt needing seated rest with PLB from 1-3 minutes to increase SpO2 on RA to 90% or greater. PT coughing and expectorating green purulent material throughout session. Activity Tolerance  Activity Tolerance: Pt reports pain 7.5/10 right groin and thigh and notes he has had deep bruising in right thigh since yesterday. Ronnie Kumar Pt ntoes he has had deep brusiing in groin since Fx. No known new injuries or trauma reported. Goals  Short term goals  Time Frame for Short term goals: To be met by 4/18/19  Short term goal 1: Pt to perform supine to/from sit transfers at flat bed without rails with supervision. GOAL met 4/15/2019 Pt demo supervision/SBA for sup<>sit transfers. Short term goal 2: Pt to perform sit to/from stand at [facility] with CGA. GOAL met 4/17/2019 CGA with cues for sit<>stand with FWW from mat/chair/WC. Short term goal 3: Pt to ambulate 50 ft with LRAD with CGA for household mobility. Short term goal 4: Pt to propel wheelchair 50 ft on level surface with mod I for household mobility.  GOAL MET 4/12/2019 Patient demo 205 feet SYL WC propulsion level and carpet with left/.right tuns and thru doorways  Short term goal 5: Pt to demonstrate car transfer with RW with mod A x 1. Long term goals  Time Frame for Long term goals : To be met by 4/25/19  Long term goal 1: Pt to perform supine to/from sit transfers at flat bed without rails with mod I.  Long term goal 2: Pt to perform sit to/from stand at [address] [place] [address] Street with mod I.  Long term goal 3: Pt to ambulate 150 ft with LRAD with mod I for community mobility. Long term goal 4: Pt to manage 1 curb step with wheelchair or LRAD with mod I for home entry. Long term goal 5: Pt to demonstrate car transfer with RW with min A x 1. Patient Goals   Patient goals : \"to be home and able to drive and do what I was doing\"    Plan    Plan  Times per week: 5/7 days week   Times per day: Daily  Current Treatment Recommendations: Strengthening, Functional Mobility Training, Wheelchair Mobility Training, Neuromuscular Re-education, Home Exercise Program, Equipment Evaluation, Education, & procurement, Safety Education & Training, Gait Training, Transfer Training, ROM, Balance Training, Endurance Training, Stair training, Pain Management, Patient/Caregiver Education & Training, Positioning  Safety Devices  Type of devices:  All fall risk precautions in place, Chair alarm in place, Left in chair, Call light within reach  Restraints  Initially in place: No     Therapy Time   Individual Concurrent Group Co-treatment   Time In 0730         Time Out 0900         Minutes 90         Timed Code Treatment Minutes: 90 Minutes       Valencia Phalen, PT

## 2019-04-18 NOTE — PLAN OF CARE
Patient's bed alarm is on with call light, belongings, and bedside table within reach. Patient call out with any concerns/needs.  Electronically signed by Jass Condon RN on 4/18/2019 at 10:33 AM

## 2019-04-18 NOTE — CARE COORDINATION
Spoke with Sudha Mckeon, PT who stated patient will not need SNF as he will be Modified Independent with his wheelchair and will be non-weight bearing for about 12 weeks.      Renay Lynne MSW, LSW

## 2019-04-18 NOTE — PROGRESS NOTES
Made Dr. Rocío Gupta aware of patient's request for clobetasol 0.05% cream ordered BID PRN. Clobetasol ordered per Dr. Rocío Gupta.   Electronically signed by Gabriel Rizo RN on 4/18/2019 at 12:57 PM  '

## 2019-04-18 NOTE — PROGRESS NOTES
Tyrone Flair  4/18/2019  [id number]    Chief Complaint: Debility    Subjective:   No acute events overnight. Patient seen this afternoon resting in bed. He reports having some pain in right thigh, however states current medication regimen is keeping it controlled. ROS: No cp, sob, n/v  Objective:  Patient Vitals for the past 24 hrs:   BP Temp Temp src Pulse Resp SpO2 Weight   04/18/19 0916 (!) 121/50 -- -- -- -- -- --   04/18/19 0744 -- -- -- -- -- 92 % --   04/18/19 0737 -- -- -- 81 -- 90 % --   04/18/19 0708 (!) 118/51 98 °F (36.7 °C) Oral 75 18 90 % --   04/18/19 0625 -- -- -- -- -- -- 178 lb 4.8 oz (80.9 kg)   04/17/19 2116 -- -- -- -- 18 92 % --   04/17/19 2115 (!) 119/49 98.7 °F (37.1 °C) Oral 75 18 91 % --     Gen: No distress, pleasant. HEENT: Normocephalic, atraumatic. CV: Regular rate and rhythm. Resp: No respiratory distress. Abd: Soft, nontender   Ext: No edema. Neuro: Alert, oriented, appropriately interactive. Wt Readings from Last 3 Encounters:   04/18/19 178 lb 4.8 oz (80.9 kg)       Laboratory data:   Lab Results   Component Value Date    WBC 10.2 04/18/2019    HGB 12.0 (L) 04/18/2019    HCT 34.9 (L) 04/18/2019    MCV 92.3 04/18/2019     (H) 04/18/2019       Lab Results   Component Value Date     04/18/2019    K 4.7 04/18/2019    CL 99 04/18/2019    CO2 28 04/18/2019    BUN 21 04/18/2019    CREATININE 1.0 04/18/2019    GLUCOSE 105 04/18/2019    CALCIUM 8.6 04/18/2019        Therapy progress:  PT  Position Activity Restriction  Other position/activity restrictions: flat foot Toe touch weightbearing on RLE, weightbearing as tolerated on LLE. Notify physician for pulse less than 50 or greater than 120, respiratory rate less than 12 or greater than 25,systolic BP less than 90 or greater than 266, diastolic BP less than 50 or greater than 100.   Objective     Sit to Stand: Contact guard assistance  Stand to sit: Moderate Assistance, Contact guard assistance  Bed to Chair: Contact guard assistance  Stand Pivot Transfers: Moderate Assistance  Device: Rolling Walker  Assistance: Contact guard assistance  Distance: 16', 18', 18'  OT  PT Equipment Recommendations  Equipment Needed: Yes  Mobility Devices: Wheelchair  Reva Minium: Rolling  Wheelchair: Standard  Other: will continue to assess  Toilet - Technique: Stand pivot  Equipment Used: Standard toilet(grab bars)  Assessment        SLP                Body mass index is 24.18 kg/m². Rehabilitation Diagnosis:   Orthopedic, 8.11, Unilateral Hip Fracture        Assessment and Plan:     Right acetabulum, inferior/superior pubic rami, and sacral fractures - Managed non-operatively. Pain control. TTWB RLE and WBAT LLE. PT/OT.     Intramuscular hematoma (piriformis, obturator) - Monitor Hgb.       COPD exacerbation - Completed steroids. Dulera, spiriva, mucinex, prn duonebs.       HTN - amlodipine     HLD - pravastatin     GERD - pantoprazole     PTSD - Restarted lorazepam at lower dose as he has been off of this for >1 week and is now also on oxycodone. Pending response consider increasing to home dose of 2mg qhs. Doign well so far.      Bladder - high risk retention - Monitor PVRs, SC prn >300cc. Flomax.      Bowel - high risk constipation - colace=senna BID, PRN MoM. follow bowel movements. Enema or suppository if needed.      Pain control - Gabapentin (for chronic neuropathy), baclofen, prn oxycodone     DVT PPx - lovenox    Anticipated dispo:  Services: HH PT, OT, RN  DME: wheelchair, ramp, RW for transfers, shower chair  ELOS: 4/26    Janeth Pacer.  Shikha Cheatham MD 4/18/2019, 1:27 PM

## 2019-04-18 NOTE — PROGRESS NOTES
Occupational Therapy  Facility/Department: [facility]  Daily Treatment Note  NAME: Marlon Wong  : 1945  MRN: [id number]    Date of Service: 2019    Discharge Recommendations:  Home with Home health OT, Home with assist PRN  OT Equipment Recommendations  Other: will likely need shower chair, possible RTS as needed    Assessment   Performance deficits / Impairments: Decreased functional mobility ; Decreased ADL status; Decreased strength;Decreased safe awareness;Decreased endurance;Decreased balance  Assessment: Pt pleasant and cooperative, transfers CGA/SBA, heavy use of grab bars with toilet transfers. Pt to restroom multiple times during both sessions. Pt completes grooming sitting at sink in w/c wiht increased time to complete. Pt with good tolerance to B UE ex, talkative and needs redirection. Patient Education: OT role, ADLs, transfers, WB status, AE for LB ADls  Activity Tolerance  Activity Tolerance: Patient Tolerated treatment well;Patient limited by pain  Activity Tolerance: increased pain with coughing  Safety Devices  Safety Devices in place: Yes  Type of devices: Bed alarm in place;Call light within reach;Gait belt;Left in bed(left in w/c with needs after 1st session)         Patient Diagnosis(es): There were no encounter diagnoses. has a past medical history of Allergic rhinitis, Anxiety, COPD (chronic obstructive pulmonary disease) ([address] Emphysema of lung ([address] GERD (gastroesophageal reflux disease), Hypercholesteremia, Hypertension, Lumbar radiculopathy, and PTSD (post-traumatic stress disorder). has no past surgical history on file. Restrictions  Lower Extremity Weight Bearing Restrictions  Right Lower Extremity Weight Bearing: Flat Foot Weight Bearing(toe touch)  Left Lower Extremity Weight Bearing: Weight Bearing As Tolerated  Position Activity Restriction  Other position/activity restrictions: flat foot Toe touch weightbearing on RLE, weightbearing as tolerated on LLE.   Notify physician for pulse less than 50 or greater than 120, respiratory rate less than 12 or greater than 25,systolic BP less than 90 or greater than 746, diastolic BP less than 50 or greater than 100. Subjective   General  Chart Reviewed: Yes  Patient assessed for rehabilitation services?: Yes  Additional Pertinent Hx: anxiety, COPD, emphysema, GERD, HTN, PTSD, lumbar radiculopathy, pelvic fx, sacral fx, fracture of R hip, pubic rami fx, laryngeal CA s/p sx, RCC s/p partial nephrectomy  Family / Caregiver Present: No  Referring Practitioner: Austin Griffith MD  Diagnosis: fall, s/p multiple fractures  Subjective  Subjective: Pt in w/c, agreeable  General Comment  Comments: RN cleared for treatment  Pain Assessment  Pain Level: 6  Pain Type: Acute pain  Pain Location: Hip;Groin  Pain Orientation: Right  Non-Pharmaceutical Pain Intervention(s): Emotional support;Distraction  Vital Signs  Patient Currently in Pain: Yes   Orientation  Orientation  Overall Orientation Status: Within Functional Limits  Objective    ADL  Grooming: Setup; Increased time to complete  Toileting: Minimal assistance(clothing mgmt)        Balance  Sitting Balance: Supervision  Standing Balance: Contact guard assistance  Standing Balance  Time: <1 min x 4  Activity: transfers  Sit to stand: Contact guard assistance  Stand to sit: Contact guard assistance  Comment: grab bars  Functional Mobility  Functional - Mobility Device: Wheelchair  Activity: To/from bathroom; Other  Assist Level: Supervision  Toilet Transfers  Toilet - Technique: Stand pivot  Equipment Used: Standard toilet(grab bars)  Toilet Transfer: Contact guard assistance     Transfers  Stand Pivot Transfers: Contact guard assistance  Sit to stand: Contact guard assistance  Stand to sit: Contact guard assistance                       Cognition  Overall Cognitive Status: Exceptions  Arousal/Alertness: Appropriate responses to stimuli  Following Commands:  Follows one step commands with repetition; Follows one step commands with increased time  Attention Span: Attends with cues to redirect  Memory: Decreased short term memory  Safety Judgement: Decreased awareness of need for assistance  Problem Solving: Assistance required to identify errors made  Insights: Decreased awareness of deficits  Initiation: Requires cues for some  Sequencing: Requires cues for some  Cognition Comment: pt very talkative, needs redirection throughout session                    Type of ROM/Therapeutic Exercise  Type of ROM/Therapeutic Exercise: AROM; Free weights  Comment: 3#  Exercises  Scapular Protraction: 2 sets x15  Scapular Retraction: 2 sets x15  Shoulder Flexion: 2 sets x15  Shoulder ABduction: 2 sets x15  Shoulder ADduction: 2 sets x15  Horizontal ABduction: 2 sets x15  Horizontal ADduction: 2 sets x15  Elbow Flexion: 2 sets x15  Elbow Extension: 2 sets x15  Supination: 2 sets x15  Pronation: 2 sets x15  Wrist Flexion: 2 sets x15  Wrist Extension: 2 sets x15                    Plan   Plan  Times per week: 5/7 days/wk  Current Treatment Recommendations: Strengthening, Balance Training, Functional Mobility Training, Endurance Training, Patient/Caregiver Education & Training, Equipment Evaluation, Education, & procurement, Self-Care / ADL    Goals  Short term goals  Time Frame for Short term goals: to be met by 4/18/19  Short term goal 1: Pt will complete LB ADLs with min A with AE as needed  Short term goal 2: Pt will complete functional transfers with Min A-met 4/15  Short term goal 3: Pt will complete toileting with min A or less-met 4/17  Short term goal 4: pt will adhere to WB status with all transfers and mobility-met 4/18  Long term goals  Time Frame for Long term goals : to be met 4/25/19  Long term goal 1: Pt will complete ADLs Mod I with AE as needed  Long term goal 2: Pt will complete all fucntional transfers Mod I  Long term goal 3: Pt will complete toileting Mod I  Long term goal 4: Pt will tolerate

## 2019-04-19 PROCEDURE — 97116 GAIT TRAINING THERAPY: CPT

## 2019-04-19 PROCEDURE — 6370000000 HC RX 637 (ALT 250 FOR IP): Performed by: PHYSICAL MEDICINE & REHABILITATION

## 2019-04-19 PROCEDURE — 97535 SELF CARE MNGMENT TRAINING: CPT

## 2019-04-19 PROCEDURE — 1280000000 HC REHAB R&B

## 2019-04-19 PROCEDURE — 97530 THERAPEUTIC ACTIVITIES: CPT

## 2019-04-19 PROCEDURE — 94640 AIRWAY INHALATION TREATMENT: CPT

## 2019-04-19 PROCEDURE — 6360000002 HC RX W HCPCS: Performed by: PHYSICAL MEDICINE & REHABILITATION

## 2019-04-19 PROCEDURE — 97110 THERAPEUTIC EXERCISES: CPT

## 2019-04-19 RX ADMIN — SENNOSIDES AND DOCUSATE SODIUM 1 TABLET: 8.6; 5 TABLET ORAL at 19:39

## 2019-04-19 RX ADMIN — OXYCODONE HYDROCHLORIDE 10 MG: 5 TABLET ORAL at 20:59

## 2019-04-19 RX ADMIN — PRAVASTATIN SODIUM 40 MG: 40 TABLET ORAL at 19:39

## 2019-04-19 RX ADMIN — GABAPENTIN 1200 MG: 300 CAPSULE ORAL at 07:56

## 2019-04-19 RX ADMIN — GUAIFENESIN 600 MG: 600 TABLET, EXTENDED RELEASE ORAL at 07:56

## 2019-04-19 RX ADMIN — TAMSULOSIN HYDROCHLORIDE 0.4 MG: 0.4 CAPSULE ORAL at 07:56

## 2019-04-19 RX ADMIN — MAGNESIUM GLUCONATE 500 MG ORAL TABLET 400 MG: 500 TABLET ORAL at 07:56

## 2019-04-19 RX ADMIN — Medication 2 PUFF: at 10:19

## 2019-04-19 RX ADMIN — OXYCODONE HYDROCHLORIDE 5 MG: 5 TABLET ORAL at 05:59

## 2019-04-19 RX ADMIN — CLOBETASOL PROPIONATE: 0.5 OINTMENT TOPICAL at 22:36

## 2019-04-19 RX ADMIN — SENNOSIDES AND DOCUSATE SODIUM 1 TABLET: 8.6; 5 TABLET ORAL at 07:56

## 2019-04-19 RX ADMIN — GABAPENTIN 1200 MG: 300 CAPSULE ORAL at 13:14

## 2019-04-19 RX ADMIN — ENOXAPARIN SODIUM 40 MG: 40 INJECTION SUBCUTANEOUS at 07:56

## 2019-04-19 RX ADMIN — BACLOFEN 10 MG: 10 TABLET ORAL at 19:39

## 2019-04-19 RX ADMIN — OXYCODONE HYDROCHLORIDE 10 MG: 5 TABLET ORAL at 17:19

## 2019-04-19 RX ADMIN — ASPIRIN 81 MG 81 MG: 81 TABLET ORAL at 07:56

## 2019-04-19 RX ADMIN — TIOTROPIUM BROMIDE 18 MCG: 18 CAPSULE ORAL; RESPIRATORY (INHALATION) at 10:18

## 2019-04-19 RX ADMIN — ACETAMINOPHEN 650 MG: 325 TABLET ORAL at 17:19

## 2019-04-19 RX ADMIN — BACLOFEN 10 MG: 10 TABLET ORAL at 07:56

## 2019-04-19 RX ADMIN — GABAPENTIN 1200 MG: 300 CAPSULE ORAL at 19:39

## 2019-04-19 RX ADMIN — GUAIFENESIN 600 MG: 600 TABLET, EXTENDED RELEASE ORAL at 17:19

## 2019-04-19 RX ADMIN — VITAMIN D, TAB 1000IU (100/BT) 1000 UNITS: 25 TAB at 07:56

## 2019-04-19 RX ADMIN — OXYCODONE HYDROCHLORIDE 10 MG: 5 TABLET ORAL at 13:14

## 2019-04-19 RX ADMIN — LORAZEPAM 1 MG: 1 TABLET ORAL at 19:39

## 2019-04-19 RX ADMIN — Medication 2 PUFF: at 19:51

## 2019-04-19 RX ADMIN — AMLODIPINE BESYLATE 5 MG: 5 TABLET ORAL at 07:56

## 2019-04-19 RX ADMIN — PANTOPRAZOLE SODIUM 40 MG: 40 TABLET, DELAYED RELEASE ORAL at 05:59

## 2019-04-19 RX ADMIN — BACLOFEN 10 MG: 10 TABLET ORAL at 13:14

## 2019-04-19 ASSESSMENT — PAIN SCALES - GENERAL
PAINLEVEL_OUTOF10: 7
PAINLEVEL_OUTOF10: 4
PAINLEVEL_OUTOF10: 7
PAINLEVEL_OUTOF10: 9
PAINLEVEL_OUTOF10: 4
PAINLEVEL_OUTOF10: 8
PAINLEVEL_OUTOF10: 6
PAINLEVEL_OUTOF10: 5
PAINLEVEL_OUTOF10: 2
PAINLEVEL_OUTOF10: 8
PAINLEVEL_OUTOF10: 6

## 2019-04-19 ASSESSMENT — PAIN DESCRIPTION - PAIN TYPE: TYPE: ACUTE PAIN

## 2019-04-19 ASSESSMENT — PAIN DESCRIPTION - LOCATION: LOCATION: HIP;GROIN

## 2019-04-19 NOTE — PROGRESS NOTES
Department of [facility]  Dr. Scottie Apple Progress Note    Patient Identification:  Luann Meza  [id number]  : 1945  Admit date: 4/10/2019      Diagnosis:   Patient Active Problem List   Diagnosis    Debility           Subjective: Pt seen this AM. Patient working therapies on his limited weight-bearing status and improving his safety with transfers and self-care activities. His pain is under control with his current medication. Patient's progress in therapies will be limited by his COPD and respiratory status. Repeat labs yesterday look good and are stable. Patient will not need SNF as he will be Modified Independent with his wheelchair and will be non-weight bearing for about 12 weeks. /62   Pulse 67   Temp 98.1 °F (36.7 °C) (Oral)   Resp 16   Ht 6' (1.829 m)   Wt 177 lb 14.6 oz (80.7 kg)   SpO2 94%   BMI 24.13 kg/m²     Last 24 hour lab  No results found for this or any previous visit (from the past 24 hour(s)). Therapy progress:  PT  Position Activity Restriction  Other position/activity restrictions: flat foot Toe touch weightbearing on RLE, weightbearing as tolerated on LLE. Notify physician for pulse less than 50 or greater than 120, respiratory rate less than 12 or greater than 25,systolic BP less than 90 or greater than 719, diastolic BP less than 50 or greater than 100. Objective     Sit to Stand: Supervision  Stand to sit: Supervision  Bed to Chair: Contact guard assistance  Stand Pivot Transfers:  Moderate Assistance  Device: Rolling Walker  Assistance: Contact guard assistance  Distance: 16', 18', 18'  OT  PT Equipment Recommendations  Equipment Needed: Yes  Mobility Devices: Wheelchair  Bari Don: Rolling  Wheelchair: Standard  Other: will continue to assess  Toilet - Technique: Stand pivot  Equipment Used: Standard toilet(grab bars)                    Assessment and Plan:     Right acetabulum, inferior/superior pubic rami, and sacral fractures - Managed non-operatively. Pain control. TTWB RLE and WBAT LLE. PT/OT.     Intramuscular hematoma (piriformis, obturator) - Monitor Hgb.       COPD exacerbation - Completed steroids. Dulera, spiriva, mucinex, prn duonebs.       HTN - amlodipine     HLD - pravastatin     GERD - pantoprazole     PTSD - Restart lorazepam at lower dose as he has been off of this for >1 week and is now also on oxycodone. Pending response consider increasing to home dose of 2mg qhs.      Bladder - high risk retention - Monitor PVRs, SC prn >300cc. Flomax.      Bowel - high risk constipation - colace=senna BID, PRN MoM. follow bowel movements.  Enema or suppository if needed.      Pain control - Gabapentin (for chronic neuropathy), baclofen, prn oxycodone     DVT PPx - lovenox       Liz Rose MD, 4/19/2019, 1:06 PM

## 2019-04-19 NOTE — CARE COORDINATION
Placed call to the [place] at [phone] and left a message for patient's  Hannah Akins (ext [phone]) to follow up on the home care/DME needed for discharge.      Zoe Acevesms MSW, LSW

## 2019-04-19 NOTE — PROGRESS NOTES
Agree with head to toe assessment completed by Deep Wilks RN. Patient resting comfortably in bed. Will continue to monitor.  Electronically signed by Rell Verduzco RN on 4/19/2019 at 5:04 PM

## 2019-04-19 NOTE — PROGRESS NOTES
Physical Therapy  Facility/Department: [facility]  Daily Treatment Note  NAME: Gokul Mccrary  : 1945  MRN: [id number]    Date of Service: 2019    Discharge Recommendations:  Continue to assess pending progress        Patient Diagnosis(es): There were no encounter diagnoses. has a past medical history of Allergic rhinitis, Anxiety, COPD (chronic obstructive pulmonary disease) ([facility] [address] Emphysema of lung ([facility] [address] GERD (gastroesophageal reflux disease), Hypercholesteremia, Hypertension, Lumbar radiculopathy, and PTSD (post-traumatic stress disorder). has no past surgical history on file. Restrictions  Lower Extremity Weight Bearing Restrictions  Right Lower Extremity Weight Bearing: Flat Foot Weight Bearing(toe touch)  Left Lower Extremity Weight Bearing: Weight Bearing As Tolerated  Position Activity Restriction  Other position/activity restrictions: flat foot Toe touch weightbearing on RLE, weightbearing as tolerated on LLE. Notify physician for pulse less than 50 or greater than 120, respiratory rate less than 12 or greater than 25,systolic BP less than 90 or greater than 691, diastolic BP less than 50 or greater than 100. Subjective   Subjective  Subjective: Pt reports 6/10 pain groin with patient reporting his right buttock feels like he is sitting on something and it hurst.  changed pt to ROho Cushion with Roho inflated to allow good support in sitting in wider WC  20\" ( pt optimum WC would be 18\")  and Nursing notified and dressed area at end of second tx. Orientation: person, place      Cognition needs review and redirection for gait techniques today     Objective   Bed mobility  Bridging: Independent  Supine to Sit: Independent  Sit to Supine: Independent  Scooting: Independent  Comment: Pt takes extra time with bed mobility today due to pain in buttock and groin. notified nursing of pt with buttock pain with noted approximate  1 inch x1/8 inch open area on right buttock.   Nursing dressed area after end of second treatment and patient given roho cushion for chair after first tx. Transfers  Sit to Stand: (FWW)  Stand to sit: (FWW)  Bed to Chair: Supervision(x3 with FWW and x2 with bed rail WB on UE and LLE no AD )  Car Transfer: (on return demo pt superivsion with car transfer with car transfer simulator  with FWW TDWB foot flat RLE  to/from [facility])  Comment: During first session pt with SpO2 after transfers and WC propulsion as low as 86% with PLB able to rebound in 1-2 mintues to 90% or greater on RA. During second session after respiratory tx pt with SpO2 post activity 90% or greater. Requested Respiratory x2 during tx   Ambulation  Ambulation?: Yes  More Ambulation?: No  Ambulation 1  Surface: level tile  Device: Rolling Walker  Assistance: Contact guard assistance  Quality of Gait: Needs cues for advanceing wallker then RLE foot flat TDWB and then LLE equal to RLE WB on UE otherwise pt hops on LLE with RLE held in flexion and under crossbar with decreased safety when walking distances greater than transfer  Distance: 18 feetx2      WC propulsion 150ft, 260 feet, 200 feet on level and up to 10 feet of carpet SYL with indep in brakes management. Adjusted WC with now ROho cushion 20 inch chair to accomodate Roho cushion  With leg rests adjusted druing session to patient comfort. Exercises  Comments: Patient given copy of written hep of LE in bed and seated exercises: gluteal and Quad isometrics, ankle pumps, heel slides in range of comfort, SAQ, and LAQ. Assessment  Patient declined first session as patient received breakfast late. PT noted to be sob during first session more easily than usual with sPO2 86% post activity with rebound to 90% or greater with PLB as noted Repsiratory called x2. During second session after respiratory tx received sPO2 on RA at 90% or4 greater post activity. Pt perseverates on concerns about going home.   However, when asked about specific concerns pt more vague about not being able to get around home in [facility]. Offered possibility of home evaluation if pt has transport to home. Pt sounded agreeable. Recommended to pt he would need to have someone grocery shop for him and help him with community transport as well as ramp recommended for entry to home from garage. PT progressed to S only for transfers, indep bed mobility and continues to require cues for gait sequence with up to  CGA and indep with WC propulsion household distances. PT noted pain in right buttock, hip and groin with impaired intact skin noted right buttock with nursing notified and pt put on roho cushion. See updated goals. Activity Tolerance  Activity Tolerance: Patient Tolerated treatment well;Patient limited by endurance  Activity Tolerance: Pt mild sob after self propulsion to gym 150 feet RA 91%          Goals  Short term goals  Time Frame for Short term goals: To be met by 4/18/19  Short term goal 1: Pt to perform supine to/from sit transfers at flat bed without rails with supervision. GOAL met 4/15/2019 Pt demo supervision/SBA for sup<>sit transfers. Short term goal 2: Pt to perform sit to/from stand at [facility] with CGA. GOAL met 4/17/2019 CGA with cues for sit<>stand with FWW from mat/chair/WC. Short term goal 3: Pt to ambulate 50 ft with LRAD with CGA for household mobility. Short term goal 4: Pt to propel wheelchair 50 ft on level surface with mod I for household mobility. GOAL MET 4/12/2019 Patient demo 205 feet SYL WC propulsion level and carpet with left/.right tuns and thru doorways  Short term goal 5: Pt to demonstrate car transfer with RW with mod A x 1. GOAL met 4/19/2019 on return demo pt superivsion with car transfer with car transfer simulator  with FWW TDWB foot flat RLE  to/from WC. Long term goals  Time Frame for Long term goals :  To be met by 4/25/19  Long term goal 1: Pt to perform supine to/from sit transfers at flat bed without rails with mod I.  Long term goal 2: Pt to perform sit to/from stand at [address] [facility] [address] with mod I.  Long term goal 3: Pt to ambulate 150 ft with LRAD with mod I for community mobility. Long term goal 4: Pt to manage 1 curb step with wheelchair or LRAD with mod I for home entry. Long term goal 5: Pt to demonstrate car transfer with RW with min A x 1. GOAL met 4/19/2019 on return demo pt superivsion with car transfer with car transfer simulator  with FWW TDWB foot flat RLE  to/from WC. Patient Goals   Patient goals : \"to be home and able to drive and do what I was doing\"    Plan    Plan  Times per week: 5/7 days week   Times per day: Daily  Current Treatment Recommendations: Strengthening, Functional Mobility Training, Wheelchair Mobility Training, Neuromuscular Re-education, Home Exercise Program, Equipment Evaluation, Education, & procurement, Safety Education & Training, Gait Training, Transfer Training, ROM, Balance Training, Endurance Training, Stair training, Pain Management, Patient/Caregiver Education & Training, Positioning  Safety Devices  Type of devices:  All fall risk precautions in place, Chair alarm in place, Left in chair, Call light within reach  Restraints  Initially in place: No     Therapy Time   Individual Concurrent Group Co-treatment   Time In 0900         Time Out 1000         Minutes 60         Timed Code Treatment Minutes: [address] Time:   Individual Concurrent Group Co-treatment   Time In 1130         Time Out 1210         Minutes 40           Timed Code Treatment Minutes:  40    Total Treatment Minutes:  100         Jaren Coombs, PT

## 2019-04-19 NOTE — PROGRESS NOTES
Occupational Therapy  Facility/Department: [facility]  Daily Treatment Note  NAME: Maday Armenta  : 1945  MRN: [id number]    Date of Service: 2019    Discharge Recommendations:  Home with Home health OT, Home with assist PRN  OT Equipment Recommendations  Other: will likely need shower chair, possible RTS as needed    Assessment   Performance deficits / Impairments: Decreased functional mobility ; Decreased ADL status; Decreased strength;Decreased safe awareness;Decreased endurance;Decreased balance  Assessment: Pt pleasant and cooperative, transfers SBA, heavy use of grab bars for toileting/toilet transfers. Pt initially limited by respiratory status, RT to give breathing tx during session. Pt then coughing frequently during session and coughing up green phlegm. Pt with good tolerance to B UE ex with 3# weights and theraband. Cont POC. Patient Education: OT role, ADLs, transfers, WB status, AE for LB ADls (reluctant to use)  REQUIRES OT FOLLOW UP: Yes  Activity Tolerance  Activity Tolerance: Patient Tolerated treatment well;Patient limited by pain  Safety Devices  Safety Devices in place: Yes  Type of devices: (left with PT)         Patient Diagnosis(es): There were no encounter diagnoses. has a past medical history of Allergic rhinitis, Anxiety, COPD (chronic obstructive pulmonary disease) ([address] Emphysema of lung ([address] GERD (gastroesophageal reflux disease), Hypercholesteremia, Hypertension, Lumbar radiculopathy, and PTSD (post-traumatic stress disorder). has no past surgical history on file. Restrictions  Lower Extremity Weight Bearing Restrictions  Right Lower Extremity Weight Bearing: Flat Foot Weight Bearing(toe touch)  Left Lower Extremity Weight Bearing: Weight Bearing As Tolerated  Position Activity Restriction  Other position/activity restrictions:  AVOID sheer to buttocks, trialling ROHO due to nonintact buttock skin.   Flat foot Toe touch weightbearing on RLE, weightbearing as tolerated on LLE. Notify physician for pulse less than 50 or greater than 120, respiratory rate less than 12 or greater than 25,systolic BP less than 90 or greater than 255, diastolic BP less than 50 or greater than 100. Subjective   General  Chart Reviewed: Yes  Patient assessed for rehabilitation services?: Yes  Additional Pertinent Hx: anxiety, COPD, emphysema, GERD, HTN, PTSD, lumbar radiculopathy, pelvic fx, sacral fx, fracture of R hip, pubic rami fx, laryngeal CA s/p sx, RCC s/p partial nephrectomy  Family / Caregiver Present: No  Referring Practitioner: Evelyn Olivas MD  Diagnosis: fall, s/p multiple fractures  Subjective  Subjective: Pt in w/c, agreeable  General Comment  Comments: RN cleared for treatment  Pain Assessment  Pain Level: 6  Pain Type: Acute pain  Pain Location: Hip;Groin  Vital Signs  Patient Currently in Pain: Yes   Orientation  Orientation  Overall Orientation Status: Within Functional Limits  Objective    ADL  Grooming: Setup; Increased time to complete  Toileting: Stand by assistance  Additional Comments: pt declines scheduled shower this date d/t desats and not receiving am breathing tx        Balance  Sitting Balance: Supervision  Standing Balance: Stand by assistance  Standing Balance  Time: <1 min x 4  Activity: transfers  Sit to stand: Stand by assistance  Stand to sit: Stand by assistance  Comment: grab bars  Functional Mobility  Functional - Mobility Device: Wheelchair  Activity: To/from bathroom; Other  Assist Level: Supervision  Toilet Transfers  Toilet - Technique: Stand pivot  Equipment Used: Standard toilet(grab bars)  Toilet Transfer: Stand by assistance     Transfers  Stand Pivot Transfers: Stand by assistance  Sit to stand: Stand by assistance  Stand to sit: Stand by assistance  Transfer Comments: pt able to adhere to WB status with SPTs and sit to stands                       Cognition  Overall Cognitive Status: Exceptions  Arousal/Alertness: Appropriate responses to I  Long term goal 3: Pt will complete toileting Mod I  Long term goal 4: Pt will tolerate standing at sink for grooming tasks >5 min w/o fatigue or LOB   Long term goal 5: Pt will complete light home mgmt/meal prep tasks Mod I  Patient Goals   Patient goals : \"go home, take care of myself\"       Therapy Time   Individual Concurrent Group Co-treatment   Time In 1000         Time Out 1130         Minutes 90         Timed Code Treatment Minutes: Bobby Reyna OT

## 2019-04-20 PROCEDURE — 94640 AIRWAY INHALATION TREATMENT: CPT

## 2019-04-20 PROCEDURE — 6360000002 HC RX W HCPCS: Performed by: PHYSICAL MEDICINE & REHABILITATION

## 2019-04-20 PROCEDURE — 6370000000 HC RX 637 (ALT 250 FOR IP): Performed by: PHYSICAL MEDICINE & REHABILITATION

## 2019-04-20 PROCEDURE — 1280000000 HC REHAB R&B

## 2019-04-20 RX ADMIN — OXYCODONE HYDROCHLORIDE 10 MG: 5 TABLET ORAL at 21:27

## 2019-04-20 RX ADMIN — VITAMIN D, TAB 1000IU (100/BT) 1000 UNITS: 25 TAB at 09:36

## 2019-04-20 RX ADMIN — OXYCODONE HYDROCHLORIDE 5 MG: 5 TABLET ORAL at 08:15

## 2019-04-20 RX ADMIN — Medication 2 PUFF: at 07:49

## 2019-04-20 RX ADMIN — OXYCODONE HYDROCHLORIDE 10 MG: 5 TABLET ORAL at 12:25

## 2019-04-20 RX ADMIN — PANTOPRAZOLE SODIUM 40 MG: 40 TABLET, DELAYED RELEASE ORAL at 06:30

## 2019-04-20 RX ADMIN — SENNOSIDES AND DOCUSATE SODIUM 1 TABLET: 8.6; 5 TABLET ORAL at 21:26

## 2019-04-20 RX ADMIN — BACLOFEN 10 MG: 10 TABLET ORAL at 08:23

## 2019-04-20 RX ADMIN — GABAPENTIN 1200 MG: 300 CAPSULE ORAL at 13:31

## 2019-04-20 RX ADMIN — ENOXAPARIN SODIUM 40 MG: 40 INJECTION SUBCUTANEOUS at 08:23

## 2019-04-20 RX ADMIN — OXYCODONE HYDROCHLORIDE 10 MG: 5 TABLET ORAL at 16:33

## 2019-04-20 RX ADMIN — GUAIFENESIN 600 MG: 600 TABLET, EXTENDED RELEASE ORAL at 08:23

## 2019-04-20 RX ADMIN — SENNOSIDES AND DOCUSATE SODIUM 1 TABLET: 8.6; 5 TABLET ORAL at 08:22

## 2019-04-20 RX ADMIN — TIOTROPIUM BROMIDE 18 MCG: 18 CAPSULE ORAL; RESPIRATORY (INHALATION) at 07:49

## 2019-04-20 RX ADMIN — Medication 2 PUFF: at 19:26

## 2019-04-20 RX ADMIN — MAGNESIUM GLUCONATE 500 MG ORAL TABLET 400 MG: 500 TABLET ORAL at 08:22

## 2019-04-20 RX ADMIN — ASPIRIN 81 MG 81 MG: 81 TABLET ORAL at 08:23

## 2019-04-20 RX ADMIN — BACLOFEN 10 MG: 10 TABLET ORAL at 21:27

## 2019-04-20 RX ADMIN — PRAVASTATIN SODIUM 40 MG: 40 TABLET ORAL at 21:26

## 2019-04-20 RX ADMIN — BACLOFEN 10 MG: 10 TABLET ORAL at 13:31

## 2019-04-20 RX ADMIN — GABAPENTIN 1200 MG: 300 CAPSULE ORAL at 21:27

## 2019-04-20 RX ADMIN — TAMSULOSIN HYDROCHLORIDE 0.4 MG: 0.4 CAPSULE ORAL at 08:23

## 2019-04-20 RX ADMIN — GUAIFENESIN 600 MG: 600 TABLET, EXTENDED RELEASE ORAL at 16:33

## 2019-04-20 RX ADMIN — OXYCODONE HYDROCHLORIDE 5 MG: 5 TABLET ORAL at 08:22

## 2019-04-20 RX ADMIN — GABAPENTIN 1200 MG: 300 CAPSULE ORAL at 09:36

## 2019-04-20 RX ADMIN — AMLODIPINE BESYLATE 5 MG: 5 TABLET ORAL at 08:23

## 2019-04-20 ASSESSMENT — PAIN SCALES - GENERAL
PAINLEVEL_OUTOF10: 0
PAINLEVEL_OUTOF10: 8
PAINLEVEL_OUTOF10: 8
PAINLEVEL_OUTOF10: 3
PAINLEVEL_OUTOF10: 8
PAINLEVEL_OUTOF10: 4

## 2019-04-20 ASSESSMENT — PAIN DESCRIPTION - FREQUENCY: FREQUENCY: INTERMITTENT

## 2019-04-20 ASSESSMENT — PAIN DESCRIPTION - DESCRIPTORS: DESCRIPTORS: ACHING

## 2019-04-20 ASSESSMENT — PAIN DESCRIPTION - PAIN TYPE: TYPE: ACUTE PAIN

## 2019-04-20 ASSESSMENT — PAIN DESCRIPTION - LOCATION: LOCATION: HIP;GROIN

## 2019-04-20 ASSESSMENT — PAIN DESCRIPTION - ORIENTATION: ORIENTATION: RIGHT

## 2019-04-21 PROCEDURE — 6370000000 HC RX 637 (ALT 250 FOR IP): Performed by: PHYSICAL MEDICINE & REHABILITATION

## 2019-04-21 PROCEDURE — 6360000002 HC RX W HCPCS: Performed by: PHYSICAL MEDICINE & REHABILITATION

## 2019-04-21 PROCEDURE — 94640 AIRWAY INHALATION TREATMENT: CPT

## 2019-04-21 PROCEDURE — 1280000000 HC REHAB R&B

## 2019-04-21 RX ADMIN — BACLOFEN 10 MG: 10 TABLET ORAL at 21:28

## 2019-04-21 RX ADMIN — VITAMIN D, TAB 1000IU (100/BT) 1000 UNITS: 25 TAB at 07:33

## 2019-04-21 RX ADMIN — PANTOPRAZOLE SODIUM 40 MG: 40 TABLET, DELAYED RELEASE ORAL at 07:27

## 2019-04-21 RX ADMIN — ASPIRIN 81 MG 81 MG: 81 TABLET ORAL at 07:33

## 2019-04-21 RX ADMIN — Medication 2 PUFF: at 09:15

## 2019-04-21 RX ADMIN — SENNOSIDES AND DOCUSATE SODIUM 1 TABLET: 8.6; 5 TABLET ORAL at 21:28

## 2019-04-21 RX ADMIN — GABAPENTIN 1200 MG: 300 CAPSULE ORAL at 13:46

## 2019-04-21 RX ADMIN — BACLOFEN 10 MG: 10 TABLET ORAL at 07:32

## 2019-04-21 RX ADMIN — GUAIFENESIN 600 MG: 600 TABLET, EXTENDED RELEASE ORAL at 07:31

## 2019-04-21 RX ADMIN — GABAPENTIN 1200 MG: 300 CAPSULE ORAL at 07:31

## 2019-04-21 RX ADMIN — AMLODIPINE BESYLATE 5 MG: 5 TABLET ORAL at 07:32

## 2019-04-21 RX ADMIN — Medication 2 PUFF: at 20:36

## 2019-04-21 RX ADMIN — PRAVASTATIN SODIUM 40 MG: 40 TABLET ORAL at 21:28

## 2019-04-21 RX ADMIN — OXYCODONE HYDROCHLORIDE 10 MG: 5 TABLET ORAL at 19:36

## 2019-04-21 RX ADMIN — GUAIFENESIN 600 MG: 600 TABLET, EXTENDED RELEASE ORAL at 17:28

## 2019-04-21 RX ADMIN — TAMSULOSIN HYDROCHLORIDE 0.4 MG: 0.4 CAPSULE ORAL at 07:32

## 2019-04-21 RX ADMIN — MAGNESIUM GLUCONATE 500 MG ORAL TABLET 400 MG: 500 TABLET ORAL at 07:32

## 2019-04-21 RX ADMIN — SENNOSIDES AND DOCUSATE SODIUM 1 TABLET: 8.6; 5 TABLET ORAL at 07:32

## 2019-04-21 RX ADMIN — ENOXAPARIN SODIUM 40 MG: 40 INJECTION SUBCUTANEOUS at 07:33

## 2019-04-21 RX ADMIN — GABAPENTIN 1200 MG: 300 CAPSULE ORAL at 21:28

## 2019-04-21 RX ADMIN — OXYCODONE HYDROCHLORIDE 10 MG: 5 TABLET ORAL at 13:46

## 2019-04-21 RX ADMIN — BACLOFEN 10 MG: 10 TABLET ORAL at 13:46

## 2019-04-21 RX ADMIN — OXYCODONE HYDROCHLORIDE 10 MG: 5 TABLET ORAL at 07:32

## 2019-04-21 RX ADMIN — TIOTROPIUM BROMIDE 18 MCG: 18 CAPSULE ORAL; RESPIRATORY (INHALATION) at 09:15

## 2019-04-21 ASSESSMENT — PAIN SCALES - GENERAL
PAINLEVEL_OUTOF10: 8
PAINLEVEL_OUTOF10: 4
PAINLEVEL_OUTOF10: 8
PAINLEVEL_OUTOF10: 8
PAINLEVEL_OUTOF10: 7
PAINLEVEL_OUTOF10: 0
PAINLEVEL_OUTOF10: 7
PAINLEVEL_OUTOF10: 3
PAINLEVEL_OUTOF10: 2

## 2019-04-21 ASSESSMENT — PAIN DESCRIPTION - LOCATION
LOCATION: HIP
LOCATION: HIP

## 2019-04-21 ASSESSMENT — PAIN DESCRIPTION - PAIN TYPE
TYPE: ACUTE PAIN
TYPE: ACUTE PAIN

## 2019-04-21 ASSESSMENT — PAIN DESCRIPTION - ORIENTATION
ORIENTATION: RIGHT
ORIENTATION: RIGHT

## 2019-04-21 ASSESSMENT — PAIN DESCRIPTION - DESCRIPTORS
DESCRIPTORS: ACHING
DESCRIPTORS: ACHING

## 2019-04-21 ASSESSMENT — PAIN DESCRIPTION - FREQUENCY: FREQUENCY: INTERMITTENT

## 2019-04-21 NOTE — PROGRESS NOTES
Took over patient care, patient resting in his bed with family visiting. Patient has no wants or needs at this time.

## 2019-04-22 LAB
ANION GAP SERPL CALCULATED.3IONS-SCNC: 10 MMOL/L (ref 3–16)
BASOPHILS ABSOLUTE: 0.1 K/UL (ref 0–0.2)
BASOPHILS RELATIVE PERCENT: 1.1 %
BUN BLDV-MCNC: 19 MG/DL (ref 7–20)
CALCIUM SERPL-MCNC: 8.5 MG/DL (ref 8.3–10.6)
CHLORIDE BLD-SCNC: 100 MMOL/L (ref 99–110)
CO2: 29 MMOL/L (ref 21–32)
CREAT SERPL-MCNC: 1 MG/DL (ref 0.8–1.3)
EOSINOPHILS ABSOLUTE: 0.3 K/UL (ref 0–0.6)
EOSINOPHILS RELATIVE PERCENT: 3.9 %
GFR AFRICAN AMERICAN: >60
GFR NON-AFRICAN AMERICAN: >60
GLUCOSE BLD-MCNC: 107 MG/DL (ref 70–99)
HCT VFR BLD CALC: 34.9 % (ref 40.5–52.5)
HEMOGLOBIN: 12 G/DL (ref 13.5–17.5)
LYMPHOCYTES ABSOLUTE: 0.9 K/UL (ref 1–5.1)
LYMPHOCYTES RELATIVE PERCENT: 13.1 %
MCH RBC QN AUTO: 32.1 PG (ref 26–34)
MCHC RBC AUTO-ENTMCNC: 34.5 G/DL (ref 31–36)
MCV RBC AUTO: 93.2 FL (ref 80–100)
MONOCYTES ABSOLUTE: 0.5 K/UL (ref 0–1.3)
MONOCYTES RELATIVE PERCENT: 7.9 %
NEUTROPHILS ABSOLUTE: 5.1 K/UL (ref 1.7–7.7)
NEUTROPHILS RELATIVE PERCENT: 74 %
PDW BLD-RTO: 13.8 % (ref 12.4–15.4)
PLATELET # BLD: 483 K/UL (ref 135–450)
PMV BLD AUTO: 6.4 FL (ref 5–10.5)
POTASSIUM REFLEX MAGNESIUM: 4.5 MMOL/L (ref 3.5–5.1)
RBC # BLD: 3.75 M/UL (ref 4.2–5.9)
SODIUM BLD-SCNC: 139 MMOL/L (ref 136–145)
WBC # BLD: 6.8 K/UL (ref 4–11)

## 2019-04-22 PROCEDURE — 1280000000 HC REHAB R&B

## 2019-04-22 PROCEDURE — 6370000000 HC RX 637 (ALT 250 FOR IP): Performed by: PHYSICAL MEDICINE & REHABILITATION

## 2019-04-22 PROCEDURE — 97530 THERAPEUTIC ACTIVITIES: CPT

## 2019-04-22 PROCEDURE — 94640 AIRWAY INHALATION TREATMENT: CPT

## 2019-04-22 PROCEDURE — 97110 THERAPEUTIC EXERCISES: CPT

## 2019-04-22 PROCEDURE — 85025 COMPLETE CBC W/AUTO DIFF WBC: CPT

## 2019-04-22 PROCEDURE — 97535 SELF CARE MNGMENT TRAINING: CPT

## 2019-04-22 PROCEDURE — 80048 BASIC METABOLIC PNL TOTAL CA: CPT

## 2019-04-22 PROCEDURE — 36415 COLL VENOUS BLD VENIPUNCTURE: CPT

## 2019-04-22 PROCEDURE — 97116 GAIT TRAINING THERAPY: CPT

## 2019-04-22 RX ADMIN — GUAIFENESIN 600 MG: 600 TABLET, EXTENDED RELEASE ORAL at 17:06

## 2019-04-22 RX ADMIN — PRAVASTATIN SODIUM 40 MG: 40 TABLET ORAL at 22:05

## 2019-04-22 RX ADMIN — BACLOFEN 10 MG: 10 TABLET ORAL at 22:05

## 2019-04-22 RX ADMIN — GABAPENTIN 1200 MG: 300 CAPSULE ORAL at 22:05

## 2019-04-22 RX ADMIN — DIPHENHYDRAMINE HCL 25 MG: 25 TABLET ORAL at 22:05

## 2019-04-22 RX ADMIN — SENNOSIDES AND DOCUSATE SODIUM 1 TABLET: 8.6; 5 TABLET ORAL at 08:54

## 2019-04-22 RX ADMIN — GUAIFENESIN 600 MG: 600 TABLET, EXTENDED RELEASE ORAL at 08:54

## 2019-04-22 RX ADMIN — Medication 2 PUFF: at 19:27

## 2019-04-22 RX ADMIN — TIOTROPIUM BROMIDE 18 MCG: 18 CAPSULE ORAL; RESPIRATORY (INHALATION) at 07:46

## 2019-04-22 RX ADMIN — BACLOFEN 10 MG: 10 TABLET ORAL at 15:13

## 2019-04-22 RX ADMIN — OXYCODONE HYDROCHLORIDE 10 MG: 5 TABLET ORAL at 22:05

## 2019-04-22 RX ADMIN — PANTOPRAZOLE SODIUM 40 MG: 40 TABLET, DELAYED RELEASE ORAL at 05:59

## 2019-04-22 RX ADMIN — SENNOSIDES AND DOCUSATE SODIUM 1 TABLET: 8.6; 5 TABLET ORAL at 22:05

## 2019-04-22 RX ADMIN — DIPHENHYDRAMINE HCL 25 MG: 25 TABLET ORAL at 00:12

## 2019-04-22 RX ADMIN — OXYCODONE HYDROCHLORIDE 10 MG: 5 TABLET ORAL at 11:26

## 2019-04-22 RX ADMIN — BACLOFEN 10 MG: 10 TABLET ORAL at 08:55

## 2019-04-22 RX ADMIN — ASPIRIN 81 MG 81 MG: 81 TABLET ORAL at 08:55

## 2019-04-22 RX ADMIN — Medication 2 PUFF: at 07:46

## 2019-04-22 RX ADMIN — VITAMIN D, TAB 1000IU (100/BT) 1000 UNITS: 25 TAB at 08:54

## 2019-04-22 RX ADMIN — GABAPENTIN 1200 MG: 300 CAPSULE ORAL at 15:12

## 2019-04-22 RX ADMIN — OXYCODONE HYDROCHLORIDE 10 MG: 5 TABLET ORAL at 00:12

## 2019-04-22 RX ADMIN — TAMSULOSIN HYDROCHLORIDE 0.4 MG: 0.4 CAPSULE ORAL at 08:54

## 2019-04-22 RX ADMIN — GABAPENTIN 1200 MG: 300 CAPSULE ORAL at 08:53

## 2019-04-22 RX ADMIN — OXYCODONE HYDROCHLORIDE 10 MG: 5 TABLET ORAL at 17:06

## 2019-04-22 RX ADMIN — AMLODIPINE BESYLATE 5 MG: 5 TABLET ORAL at 08:54

## 2019-04-22 RX ADMIN — LORAZEPAM 1 MG: 1 TABLET ORAL at 23:00

## 2019-04-22 RX ADMIN — OXYCODONE HYDROCHLORIDE 10 MG: 5 TABLET ORAL at 06:03

## 2019-04-22 RX ADMIN — MAGNESIUM GLUCONATE 500 MG ORAL TABLET 400 MG: 500 TABLET ORAL at 08:53

## 2019-04-22 ASSESSMENT — PAIN DESCRIPTION - ORIENTATION: ORIENTATION: RIGHT

## 2019-04-22 ASSESSMENT — PAIN SCALES - GENERAL
PAINLEVEL_OUTOF10: 8
PAINLEVEL_OUTOF10: 7
PAINLEVEL_OUTOF10: 8
PAINLEVEL_OUTOF10: 5
PAINLEVEL_OUTOF10: 4
PAINLEVEL_OUTOF10: 8
PAINLEVEL_OUTOF10: 5
PAINLEVEL_OUTOF10: 8

## 2019-04-22 ASSESSMENT — PAIN DESCRIPTION - LOCATION: LOCATION: HIP;PELVIS

## 2019-04-22 ASSESSMENT — PAIN DESCRIPTION - PAIN TYPE: TYPE: ACUTE PAIN

## 2019-04-22 ASSESSMENT — PAIN DESCRIPTION - DESCRIPTORS: DESCRIPTORS: ACHING

## 2019-04-22 NOTE — PROGRESS NOTES
Physical Therapy  Facility/Department: [facility]  Daily Treatment Note  NAME: Sancho Ma  : 1945  MRN: [id number]    Date of Service: 2019    Discharge Recommendations:  Continue to assess pending progress        Patient Diagnosis(es): There were no encounter diagnoses. has a past medical history of Allergic rhinitis, Anxiety, COPD (chronic obstructive pulmonary disease) ([facility] [address] Emphysema of lung ([facility] [address] GERD (gastroesophageal reflux disease), Hypercholesteremia, Hypertension, Lumbar radiculopathy, and PTSD (post-traumatic stress disorder). has no past surgical history on file. Restrictions  Lower Extremity Weight Bearing Restrictions  Right Lower Extremity Weight Bearing: Flat Foot Weight Bearing(toe touch)  Left Lower Extremity Weight Bearing: Weight Bearing As Tolerated  Position Activity Restriction  Other position/activity restrictions:  AVOID sheer to buttocks, trialling ROHO due to nonintact buttock skin. Flat foot Toe touch weightbearing on RLE, weightbearing as tolerated on LLE. Notify physician for pulse less than 50 or greater than 120, respiratory rate less than 12 or greater than 25,systolic BP less than 90 or greater than 086, diastolic BP less than 50 or greater than 100. Subjective  Pt reports 7-8/10 right groin and thigh with mobility across sessions. General  Chart Reviewed: Yes  Additional Pertinent Hx: s/p fall resulting in pelvic and sacral fractures (non-operative treatment)  Family / Caregiver Present: No  Referring Practitioner: Arianna Wiley MD  Subjective  Subjective: PT rates pain at rest 2/10 in groing and right buttock and with mobility 8/10.   Vital Signs  Pulse: 100(irregular)  BP: (!) 138/52(manual RUE )  BP Location: Right upper arm  Patient Position: Sitting  Oxygen Therapy  SpO2: 92 %  Pulse Oximeter Device Mode: Intermittent  Pulse Oximeter Device Location: Right;Finger  O2 Device: None (Room air)   Pt gave informed consent to speak with  friend [place] with patient calling  her on his cell phone on speaker ([phone]). Pt's friend Benydavid Delgadojoan voices concern that pt will no be able to function at home at [facility] level. Suggested  home health aides to help for groceries, bathing and ramp to entrance to home. Jodi Amador notes sCase manager told her that request for ramp is with [address] noted, \"he may have to go to a SNF. \"  Suggested if pt wants to go to a SNF we should inform physician  of this change. Pt and his family friend requested we see how home visit goes if able. Will reach out to physician with requents for orders for home visit. Pt notes he cannot currently take care of his son with developmental needs if he is non-ambulatory. Pt's family friend Beny Vern and patient later during third session state they do no want home visit and plan to pursue SNF placement. Suggested pt that he could get Home health aides to help him with bathing and meal prep/shopping etc.  Pt declines stating he is unable to be home until he can walk as ne notes his son Mark Dior that is developmentally disabled \"could not handle it if I came home and was in a WC. \"        Orientation: alert and oriented to person, place ,day     Cognition follows 2 step instruction but needs intermittent redirection with walking sequencing for step to gait TDWB with foot flat. Objective    2x15 minute sessions:   Bed mobility  Supine to Sit: Independent  Transfers  Sit to Stand: Modified independent  Stand to sit: Modified independent  Bed to Chair: Modified independent  Comment: SYL transfer bed<>WC with use of bed rail and FWW and use of grab bar froom WC<>commode. Pt able to manage pants and toilet hygiene SYL with use of grab bars.        LE therex: ankle pumps x3  WC propulsion 160 feet x2 SYL      Session time missed mid session due to communication from family friend as noted above, make up minutes scheduled and rescheduled as during rescheduled session patinet filling out checks with family friend [facility].  Pt's family friend [place] had requested to speak with CM with  notified of concerns. At end of session returned to bed with bed alarm engaged       Second session:  Transfer:   Bed<>WC with bed rail SYL with footflat TDWB RLE  WC<>mat SYL with FWW with pt indep in managing LE and maintaining  footflat TDWB RLE  WC propulsion 160 feet x2 SYL with carpet and left right turns. Gait SBA18 feet x2 with FWW and cues for STEp to gait with pt preferring to NWB RLE and hop to gait. PT after walking and WC propulsion with SpO2 89% with rebound to 90% or greater with seated rest.   At end of session returned to bed with bed alarm engaged    Third Session:  LE therex in bed:  ankle pumps x30  SAQ x30 BLE  Gluteal sets x15 BLE   Bed mobility:  Sit>sup SYL with use of rail  Transfer:  Bed>WC SPT on LLE with bed rail SYL  Sit<>stand from [address] with FWW, SYL to seat without arm rest and mod assist with chair without arm rest.   WC propulsion 160 feet indep with left/right turns  Gait SBA with FWW  footflat TDWB RLE 12 feet SBA with cues for step to gait with pt hopping to with not employing TDWB RLE but NWB as advances RLE despite cues and explanation. Gait SBAin parallel bars x5 feet  footflat TDWB RLE with pt employing step to technique with cues for sequencing vs hopping. PT requested to sit immediately after 5 minutes stating fatigue with sPO2 92-96% immediate post walking and  bpm.   PT stated, \"I feel weak today. \"   Transfer:  Standing in parallel bars sit<>Stand x2 with bars SYL  footflat TDWB RLE  Standing in bars RLE hip flex to step forward TDWB foot flat  And return to neutral to neutral x15  At end of session OT with patient   Fourth Session:  WC propulsion 300 feet x2 and 10 feet ramp incline decline SYL with left right turns and doorways negotiated.   PT needed brief 1-2 minute seated PLB break due to sob with sPO2 89% with rebound to 91% or greater  Transfer bed<>WC SPT SYL footflat TDWB RLE  Gait SBA18 feet FWW and cues for Step to gait ( footflat TDWB RLE)  with pt preferring to NWB RLE and hop to gait. At end of session pt returned to bed with call light in reach and bed alarm engaged. Assessment   Assessment: Pt seen this date for practice endurance with transfers with pt today SYL for transfer bed<>WC with hospital bed rail  And WC<>mat with FWW MODIand FWW and WC<>toilet with grab bar . Pt demo indep 300 feet WC propulsion with BUE and up and down 10 foot ramp. Pt noted to have sob requiring seated rest with PLB after walking and WC propulsion. PT at end of session declined offered home evaluation and states he want to go to a SNF. Pt is currently  SYL at a WC level for gait and transfers from hospital bed with rail, mat, WC or toilet with grab bar. Pt is easily sob and requires brief rest after activity with PLB to recover. PT tolerated LE therex fro conditioning and required assist only with gait in parallel bars for transfers due to fatigue and with transfers from lower chair without arms. Continue to progress towards goals. Recommended to pt to have ramp installed in home. Pt notes he [facility]. G        Goals  Short term goals  Time Frame for Short term goals: To be met by 4/18/19  Short term goal 1: Pt to perform supine to/from sit transfers at flat bed without rails with supervision. GOAL met 4/15/2019 Pt demo supervision/SBA for sup<>sit transfers. Short term goal 2: Pt to perform sit to/from stand at [facility] with CGA. GOAL met 4/17/2019 CGA with cues for sit<>stand with FWW from mat/chair/WC. Short term goal 3: Pt to ambulate 50 ft with LRAD with CGA for household mobility. Short term goal 4: Pt to propel wheelchair 50 ft on level surface with mod I for household mobility.  GOAL MET 4/12/2019 Patient demo 205 feet SYL WC propulsion level and carpet with left/.right tuns and thru doorways  Short term goal 5: Pt to demonstrate car transfer with RW with mod A x 1. GOAL met 4/19/2019 on return demo pt superivsion with car transfer with car transfer simulator  with FWW TDWB foot flat RLE  to/from WC. Long term goals  Time Frame for Long term goals : To be met by 4/25/19  Long term goal 1: Pt to perform supine to/from sit transfers at flat bed without rails with mod I. GOAL MET 4/19/2019 Pt indep sup<>sit from [facility] flat without use of rails   Long term goal 2: Pt to perform sit to/from stand at LRAD with mod I.  Long term goal 3: Pt to propel WC  150 ft with LRAD with mod I for community mobility. 4/22/2019  GOAL met pt demo  feet propulsion with WC including 10 foot ramp left/right turn and carpet. Long term goal 4: Pt to manage 1 curb step with wheelchair or LRAD with mod I for home entry. Long term goal 5: Pt to demonstrate car transfer with RW with min A x 1. GOAL met 4/19/2019 on return demo pt superivsion with car transfer with car transfer simulator  with FWW TDWB foot flat RLE  to/from WC. Patient Goals   Patient goals : \"to be home and able to drive and do what I was doing\"    Plan    Plan  Times per week: 5/7 days week   Times per day: Daily  Current Treatment Recommendations: Strengthening, Functional Mobility Training, Wheelchair Mobility Training, Neuromuscular Re-education, Home Exercise Program, Equipment Evaluation, Education, & procurement, Safety Education & Training, Gait Training, Transfer Training, ROM, Balance Training, Endurance Training, Stair training, Pain Management, Patient/Caregiver Education & Training, Positioning  Safety Devices  Type of devices:  All fall risk precautions in place, Chair alarm in place, Left in chair, Call light within reach  Restraints  Initially in place: No     Therapy Time   Individual Concurrent Group Co-treatment   Time In 0730         Time Out 0745         Minutes 15         Timed Code Treatment Minutes: 15 Minutes     Individual Concurrent Group Co-treatment   Time In 0815         Time Out 0830         Minutes 15           Timed Code Treatment Minutes:  15   Second Session Therapy Time:   Individual Concurrent Group Co-treatment   Time In 0930         Time Out 1000         Minutes 30           Timed Code Treatment Minutes:  30        Third Therapy Time   Individual Concurrent Group Co-treatment   Time In 4761         Time Out 1100         Minutes 20           Timed Code Treatment Minutes: 20   Fourth Therapy time:    Individual Concurrent Group Co-treatment   Time In 5950         Time Out 0201         Minutes 20           Timed Code Treatment Minutes:  20    Total Treatment Minutes:  100               Robert Serra, PT

## 2019-04-22 NOTE — PROGRESS NOTES
Equipment Recommendations  Equipment Needed: Yes  Mobility Devices: Wheelchair  Walker: Rolling  Wheelchair: Standard  Other: will continue to assess  Toilet - Technique: Stand pivot  Equipment Used: Standard toilet(grab bars)  Assessment        SLP                Body mass index is 22.76 kg/m². Rehabilitation Diagnosis:   Orthopedic, 8.11, Unilateral Hip Fracture        Assessment and Plan:     Right acetabulum, inferior/superior pubic rami, and sacral fractures - Managed non-operatively. Pain control. TTWB RLE and WBAT LLE. PT/OT.     Intramuscular hematoma (piriformis, obturator) - Monitor Hgb.       COPD exacerbation - Completed steroids. Dulera, spiriva, mucinex, prn duonebs.       HTN - amlodipine     HLD - pravastatin     GERD - pantoprazole     PTSD - Restarted lorazepam at lower dose as he has been off of this for >1 week and is now also on oxycodone. Pending response consider increasing to home dose of 2mg qhs. Doign well so far.      Bladder - high risk retention - Monitor PVRs, SC prn >300cc. Flomax.      Bowel - high risk constipation - colace=senna BID, PRN MoM. follow bowel movements. Enema or suppository if needed.      Pain control - Gabapentin (for chronic neuropathy), baclofen, prn oxycodone     DVT PPx - lovenox    Anticipated dispo:  Services: MADISYN PT, OT, RN  DME: wheelchair, ramp, RW for transfers, shower chair  ELOS: 4/26    Bryan Curtis MD 4/22/2019, 1:07 PM

## 2019-04-22 NOTE — CARE COORDINATION
SW met with the patient, Taurus Linder (patient's friend) and her  at bedside per request of Mellissa Curran PT. Both patient and Taurus Linder feels that patient needs to go to a [facility] from here before returning home. Taurus Linder stated patient has 14 steps at home and no ramp at home. Provided them with a [facility] SNF list to review and explained writer can make referrals to see if facility/[facility] will approve patient to go to SNF. They requested writer make referrals to [facility]) and the [facility]. Placed call to Christi Gunn at [facility] and sent a facesheet to [phone]. Will wait for return call.      Elijah Turner MSW, LSW

## 2019-04-22 NOTE — CARE COORDINATION
Left voicemail for Nini Grimm,  for the [place] [facility] at [address] asking for a call back to determine if they have any open beds. Referral is pending at [facility]).      Marlon Age MSW, LSW

## 2019-04-22 NOTE — CARE COORDINATION
Received voicemail from Kiana at [facility] stating they can accept the patient on Friday and will hold the bed until then. He stated that he contacted the [place] for approval.    Placed call to Kiana who confirmed the [place] has APPROVED patient to admit to their facility. Will update patient when he is done working with therapy. Addendum at 3:10pm: Updated patient at bedside that [facility] accepted patient. He stated writer could update Bobby Eddy and he is agreeable to this plan. Updated Bobby Eddy, patient's friend via phone call.      Bonnie Guzman MSW, LSW

## 2019-04-22 NOTE — PATIENT CARE CONFERENCE
[address] [facility]  Inpatient Rehabilitation  Weekly Team Conference Note    Date:   Patient Name: Luann Meza        MRN: [id number]    : 1945  (78 y.o.)  Gender: male   Referring Practitioner: Fidelina Siddiqui MD  Diagnosis: Debility      Interventions to be utilized toward barriers to discharge, per discipline:  [address] observed barriers to dc: Pain  Nursing interventions:pain medications and alternatives to pain meds, repositioning   Family Education:   Fall Risk:  Yes      Physical therapy observed barriers to dc:    Baseline: indep  No AD was in pulmonary rehab per pt   Current level: SYL at [facility] level with FX preventing further functional progression with walking/elevations until pt with increased WB tolerance   Barriers to DC: Pt reports no assist at home and his disabled son cannot return to live with him until he is walking. Needs in order to achieve dc home/next level of care: intermittent assist from [facility] or caregiver  And ramp for 1 step entrance. Recommend 18 inch wide  WC with cushion  Elevating leg rest and FWW.    Ramped entrance       Physical therapy interventions:   Current Treatment Recommendations: Strengthening, Functional Mobility Training, Wheelchair Mobility Training, Neuromuscular Re-education, Home Exercise Program, Equipment Evaluation, Education, & procurement, Safety Education & Training, Gait Training, Transfer Training, ROM, Balance Training, Endurance Training, Stair training, Pain Management, Patient/Caregiver Education & Training, Positioning      PHYSICAL THERAPY  FIMs last conference:  [employer], Chair, Wheel Chair: 2 - Requires 50-74% assistance to transfer  Walk: 1 - Total Assistance Walks < 50 feet OR requires two or more people OR patient performs < 25% of locomotion effort  Distance Walked: 18'  Wheel Chair: 6 - [address] [place] Operates wheelchair at least 150 feet with an ambulatory device, orthosis or prosthesis OR requires extra amount of time OR there is concern for safety  Distance Traveled in Wheel Chair: 150 feet  Stairs: 0 - Activity Does not Occur ( 0 only for the admission assessment)        FIMS current conference:  Bed, Chair, Wheel Chair: 6 - Requires assistive device (slide rail)  Walk: 1 - Total Assistance Walks < 50 feet OR requires two or more people OR patient performs < 25% of locomotion effort  Distance Walked: 18'  Wheel Chair: 6 - [address] [place] Operates wheelchair at least 150 feet with an ambulatory device, orthosis or prosthesis OR requires extra amount of time OR there is concern for safety  Distance Traveled in Wheel Chair: 160'(with ramp  10 feet up and down )  Stairs: 0 - Activity Does not Occur ( 0 only for the admission assessment)    PT Equipment Recommendations  Equipment Needed: Yes  Mobility Devices: Wheelchair  Walker: Rolling  Wheelchair: Standard  Other: will continue to assess    Assessment: Pt seen this date for practice endruance with transfers with pt today SYL for transfer bed<>WC with hospital bed rail and FWW and WC<>toilet with grab bar .        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              Current level: grooming SPV, UB ADLs min A, LB ADLs max A, transfers min A              Barriers to DC: son unable to physically assist              Needs in order to achieve dc home/next level of care: SPV to mod I for ADLs and transfers         Occupational Therapy interventions:  Current Treatment Recommendations: Strengthening, Balance Training, Functional Mobility Training, Endurance Training, Patient/Caregiver Education & Training, Equipment Evaluation, Education, & procurement, Self-Care / ADL      OCCUPATIONAL THERAPY  FIMs last conference  Eatin - Patient feeds self  Groomin - Requires setup/cues to do all tasks  Bathin - Able to bathe 3-4 areas  Dressing-Upper: 4 - Requires assist with buttons/zippers only and/or requires assist with one arm only  Dressing-Lower: 2 - Requires assist with 4-5 parts of dressing  Toileting: 3 - Able to perform 2 tasks  Toilet Transfer: 2 - Requires 50-74% assist getting off toilet  Shower Transfer: 0 - Activity does not occur(pt refused)        FIMS: current conference  Eatin - Patient feeds self  Groomin - Requires setup/cues to do all tasks  Bathin - Able to bathe 8-9 areas  Dressing-Upper: 5 - Requires setup/supervision/cues and/or requires assist with presthesis/brace only  Dressing-Lower: 3 - Requires assist with 2-3 parts of dressing  Toileting: 3 - Able to perform 2 tasks  Toilet Transfer: 4 - Requires steadying assistance only < 25% assist  Shower Transfer: 4 - Minimal contact assistance, pt. expends 75% or more effort      Assessment: Pt pleasant and cooperative, transfers SBA, heavy use of grab bars for toileting/toilet transfers. Pt initially limited by respiratory status, RT to give breathing tx during session. Pt then coughing frequently during session and coughing up green phlegm. Pt with good tolerance to B UE ex with 3# weights and theraband. Cont POC. Speech therapy observed barriers to dc:    Baseline:    Current level:   Barriers to DC:   Needs in order to achieve dc home/next level of care:    Speech Therapy interventions:  Dysphagia:    Speech/Language/Cognition:        SPEECH THERAPY (intentionally left blank if not actively being seen by this service):       NUTRITION  Weight: 167 lb 12.8 oz (76.1 kg) / Body mass index is 22.76 kg/m². Diet Order: DIET GENERAL;  Dietary Nutrition Supplements: Standard High Calorie Oral Supplement  PO Meals Eaten (%): 76 - 100%  Education: Declined      CASE MANAGEMENT  Assessment: Patient has been accepted to [facility] under [place].         Interdisciplinary Goals:   1.)Pt will complete toileting Mod I  2.)Patient will have adequate pain management 3. )  4.)  5.)    Discharge Plan   Estimated discharge date: 4-   Destination: skilled nursing facility  Pass:No  Services at Discharge: SNF Physical Therapy, Occupational Therapy and Nursing Other pending eval  Equipment at Discharge: defer to SNF    Team Members Present at Conference:  : Kvng Hoover MSW, LSW     Occupational Therapist: Carlito Mike OT  Physical Therapist: [place], AYESHA  Speech Therapist: n/a  Nurse: Tylor Peck RN   Dietician: Lizette Buenrostro RDN, LD  : Erika Hendreson  Psychiatry: n/a    Family members present at conference: n/a      I led this team conference and I approve the established interdisciplinary plan of care as documented within the medical record of Ferny Hernandez MD: Ashwini Ely.  Rufino Grande MD 4/23/2019, 5:55 PM

## 2019-04-22 NOTE — PROGRESS NOTES
Occupational Therapy  Facility/Department: [facility] ARU  Daily Treatment Note  NAME: Mireya Sharp  : 1945  MRN: [id number]    Date of Service: 2019    Discharge Recommendations:  Home with Home health OT, Home with assist PRN  OT Equipment Recommendations  Other: will likely need shower chair, possible RTS as needed    Assessment   Performance deficits / Impairments: Decreased functional mobility ; Decreased ADL status; Decreased strength;Decreased safe awareness;Decreased endurance;Decreased balance  Assessment: Pt pleasant and cooperative, cont to be limited by pain. Pt performs transfers with SPV from all surfaces, tolerates UE ex well this date. Pt completes shower with SPV, min A to dry distal LEs. Pt able to complete grooming at sink with increased time, UB dressing with SPV, LB dressing with mod A , assist to pull up pants and larissa socks. Pt reluctant to use AE for LB ADLs. Cont POC. Patient Education: OT role, ADLs, transfers, WB status, AE for LB ADls (reluctant to use)  Activity Tolerance  Activity Tolerance: Patient Tolerated treatment well;Patient limited by pain  Safety Devices  Safety Devices in place: Yes  Type of devices: Bed alarm in place; Patient at risk for falls; Left in bed;Call light within reach         Patient Diagnosis(es): There were no encounter diagnoses. has a past medical history of Allergic rhinitis, Anxiety, COPD (chronic obstructive pulmonary disease) ([facility] [address] Emphysema of lung ([facility] [address] GERD (gastroesophageal reflux disease), Hypercholesteremia, Hypertension, Lumbar radiculopathy, and PTSD (post-traumatic stress disorder). has no past surgical history on file.     Restrictions  Lower Extremity Weight Bearing Restrictions  Right Lower Extremity Weight Bearing: Flat Foot Weight Bearing(toe touch)  Left Lower Extremity Weight Bearing: Weight Bearing As Tolerated  Position Activity Restriction  Other position/activity restrictions:  AVOID sheer to buttocks, trialling ROHO due to nonintact buttock skin. Flat foot Toe touch weightbearing on RLE, weightbearing as tolerated on LLE. Notify physician for pulse less than 50 or greater than 120, respiratory rate less than 12 or greater than 25,systolic BP less than 90 or greater than 912, diastolic BP less than 50 or greater than 100. Subjective   General  Chart Reviewed: Yes  Patient assessed for rehabilitation services?: Yes  Additional Pertinent Hx: anxiety, COPD, emphysema, GERD, HTN, PTSD, lumbar radiculopathy, pelvic fx, sacral fx, fracture of R hip, pubic rami fx, laryngeal CA s/p sx, RCC s/p partial nephrectomy  Family / Caregiver Present: No  Referring Practitioner: Argenis Sanchez MD  Diagnosis: fall, s/p multiple fractures  Subjective  Subjective: Pt in w/c, agreeable  General Comment  Comments: RN cleared for treatment  Pain Assessment  Pain Level: 8  Pain Type: Acute pain  Pain Location: Hip;Pelvis  Pain Orientation: Right  Pain Descriptors: Aching  Non-Pharmaceutical Pain Intervention(s): Shower;Repositioned; Emotional support  Vital Signs  Patient Currently in Pain: Yes   Orientation  Orientation  Overall Orientation Status: Within Functional Limits  Objective    ADL  Feeding: Independent  Grooming: Setup; Increased time to complete  UE Bathing: Supervision  LE Bathing: Minimal assistance(to dry distal LEs)  UE Dressing: Supervision  LE Dressing: Moderate assistance(pull up pants, larissa socks )  Toileting: Stand by assistance  Additional Comments: pt completes shower with increased time, reluctant to use AE for LB ADLs        Balance  Sitting Balance: Independent  Standing Balance: Supervision  Standing Balance  Time: <1 min x 6  Activity: transfers  Sit to stand: Supervision  Stand to sit: Supervision  Comment: garcia earl  Functional Mobility  Functional - Mobility Device: Wheelchair  Activity: To/from bathroom; Other  Assist Level: Supervision  Toilet Transfers  Toilet - Technique: Stand pivot  Equipment Used: Standard toilet(garcia will complete LB ADLs with min A with AE as needed  Short term goal 2: Pt will complete functional transfers with Min A-met 4/15  Short term goal 3: Pt will complete toileting with min A or less-met 4/17  Short term goal 4: pt will adhere to WB status with all transfers and mobility-met 4/18  Long term goals  Time Frame for Long term goals : to be met 4/25/19  Long term goal 1: Pt will complete ADLs Mod I with AE as needed  Long term goal 2: Pt will complete all fucntional transfers Mod I  Long term goal 3: Pt will complete toileting Mod I  Long term goal 4: Pt will tolerate standing at sink for grooming tasks >5 min w/o fatigue or LOB   Long term goal 5: Pt will complete light home mgmt/meal prep tasks Mod I  Patient Goals   Patient goals : \"go home, take care of myself\"         Therapy Time   Individual Concurrent Group Co-treatment   Time In 1100         Time Out 1130         Minutes 30         Timed Code Treatment Minutes: 30 Minutes    Therapy Time   Individual Concurrent Group Co-treatment   Time In 1410         Time Out 1520         Minutes 70         Timed Code Treatment Minutes: Dieudonne [address], OT

## 2019-04-23 PROCEDURE — 94640 AIRWAY INHALATION TREATMENT: CPT

## 2019-04-23 PROCEDURE — 97530 THERAPEUTIC ACTIVITIES: CPT

## 2019-04-23 PROCEDURE — 6370000000 HC RX 637 (ALT 250 FOR IP): Performed by: PHYSICAL MEDICINE & REHABILITATION

## 2019-04-23 PROCEDURE — 97535 SELF CARE MNGMENT TRAINING: CPT

## 2019-04-23 PROCEDURE — 97110 THERAPEUTIC EXERCISES: CPT

## 2019-04-23 PROCEDURE — 6360000002 HC RX W HCPCS: Performed by: PHYSICAL MEDICINE & REHABILITATION

## 2019-04-23 PROCEDURE — 1280000000 HC REHAB R&B

## 2019-04-23 PROCEDURE — 97116 GAIT TRAINING THERAPY: CPT

## 2019-04-23 RX ADMIN — MAGNESIUM GLUCONATE 500 MG ORAL TABLET 400 MG: 500 TABLET ORAL at 08:49

## 2019-04-23 RX ADMIN — LORAZEPAM 1 MG: 1 TABLET ORAL at 20:59

## 2019-04-23 RX ADMIN — ASPIRIN 81 MG 81 MG: 81 TABLET ORAL at 08:49

## 2019-04-23 RX ADMIN — Medication 2 PUFF: at 20:18

## 2019-04-23 RX ADMIN — GABAPENTIN 1200 MG: 300 CAPSULE ORAL at 20:58

## 2019-04-23 RX ADMIN — PANTOPRAZOLE SODIUM 40 MG: 40 TABLET, DELAYED RELEASE ORAL at 06:45

## 2019-04-23 RX ADMIN — PRAVASTATIN SODIUM 40 MG: 40 TABLET ORAL at 20:59

## 2019-04-23 RX ADMIN — OXYCODONE HYDROCHLORIDE 5 MG: 5 TABLET ORAL at 13:41

## 2019-04-23 RX ADMIN — OXYCODONE HYDROCHLORIDE 10 MG: 5 TABLET ORAL at 20:58

## 2019-04-23 RX ADMIN — TAMSULOSIN HYDROCHLORIDE 0.4 MG: 0.4 CAPSULE ORAL at 08:49

## 2019-04-23 RX ADMIN — BACLOFEN 10 MG: 10 TABLET ORAL at 20:59

## 2019-04-23 RX ADMIN — DIPHENHYDRAMINE HCL 25 MG: 25 TABLET ORAL at 20:59

## 2019-04-23 RX ADMIN — GUAIFENESIN 600 MG: 600 TABLET, EXTENDED RELEASE ORAL at 17:44

## 2019-04-23 RX ADMIN — BACLOFEN 10 MG: 10 TABLET ORAL at 13:41

## 2019-04-23 RX ADMIN — GABAPENTIN 1200 MG: 300 CAPSULE ORAL at 13:41

## 2019-04-23 RX ADMIN — SENNOSIDES AND DOCUSATE SODIUM 1 TABLET: 8.6; 5 TABLET ORAL at 08:50

## 2019-04-23 RX ADMIN — ENOXAPARIN SODIUM 40 MG: 40 INJECTION SUBCUTANEOUS at 08:52

## 2019-04-23 RX ADMIN — TIOTROPIUM BROMIDE 18 MCG: 18 CAPSULE ORAL; RESPIRATORY (INHALATION) at 07:43

## 2019-04-23 RX ADMIN — GUAIFENESIN 600 MG: 600 TABLET, EXTENDED RELEASE ORAL at 08:49

## 2019-04-23 RX ADMIN — SENNOSIDES AND DOCUSATE SODIUM 1 TABLET: 8.6; 5 TABLET ORAL at 20:59

## 2019-04-23 RX ADMIN — VITAMIN D, TAB 1000IU (100/BT) 1000 UNITS: 25 TAB at 08:50

## 2019-04-23 RX ADMIN — Medication 2 PUFF: at 07:42

## 2019-04-23 RX ADMIN — OXYCODONE HYDROCHLORIDE 5 MG: 5 TABLET ORAL at 13:47

## 2019-04-23 RX ADMIN — BACLOFEN 10 MG: 10 TABLET ORAL at 08:50

## 2019-04-23 RX ADMIN — GABAPENTIN 1200 MG: 300 CAPSULE ORAL at 08:49

## 2019-04-23 RX ADMIN — OXYCODONE HYDROCHLORIDE 10 MG: 5 TABLET ORAL at 08:50

## 2019-04-23 ASSESSMENT — PAIN DESCRIPTION - ORIENTATION
ORIENTATION: RIGHT
ORIENTATION: RIGHT

## 2019-04-23 ASSESSMENT — PAIN SCALES - GENERAL
PAINLEVEL_OUTOF10: 7
PAINLEVEL_OUTOF10: 8
PAINLEVEL_OUTOF10: 8
PAINLEVEL_OUTOF10: 7
PAINLEVEL_OUTOF10: 7

## 2019-04-23 ASSESSMENT — PAIN DESCRIPTION - LOCATION
LOCATION: PELVIS
LOCATION: HIP;PELVIS

## 2019-04-23 ASSESSMENT — PAIN DESCRIPTION - PAIN TYPE
TYPE: ACUTE PAIN
TYPE: ACUTE PAIN

## 2019-04-23 ASSESSMENT — PAIN DESCRIPTION - DESCRIPTORS: DESCRIPTORS: ACHING

## 2019-04-23 NOTE — PROGRESS NOTES
Louis Salazar  4/23/2019  [id number]    Chief Complaint: Debility    Subjective:   No acute events overnight. Patient seen this afternoon sitting up in room. He is frustrated and does not feel he can go home due to needing to care for his son with MRDD. ROS: No cp, sob, n/v  Objective:  Patient Vitals for the past 24 hrs:   BP Temp Temp src Pulse Resp SpO2   04/23/19 0930 -- -- -- -- -- 91 %   04/23/19 0845 (!) 111/52 97.6 °F (36.4 °C) Oral 80 18 95 %   04/22/19 1949 (!) 132/57 98.1 °F (36.7 °C) Oral 74 18 90 %   04/22/19 1927 -- -- -- -- -- 93 %     Gen: No distress, pleasant. HEENT: Normocephalic, atraumatic. CV: Regular rate and rhythm. Resp: No respiratory distress. Abd: Soft, nontender   Ext: No edema. Neuro: Alert, oriented, appropriately interactive. Wt Readings from Last 3 Encounters:   04/22/19 167 lb 12.8 oz (76.1 kg)       Laboratory data:   Lab Results   Component Value Date    WBC 6.8 04/22/2019    HGB 12.0 (L) 04/22/2019    HCT 34.9 (L) 04/22/2019    MCV 93.2 04/22/2019     (H) 04/22/2019       Lab Results   Component Value Date     04/22/2019    K 4.5 04/22/2019     04/22/2019    CO2 29 04/22/2019    BUN 19 04/22/2019    CREATININE 1.0 04/22/2019    GLUCOSE 107 04/22/2019    CALCIUM 8.5 04/22/2019        Therapy progress:  PT  Position Activity Restriction  Other position/activity restrictions:  AVOID sheer to buttocks, trialling ROHO due to nonintact buttock skin. Flat foot Toe touch weightbearing on RLE, weightbearing as tolerated on LLE. Notify physician for pulse less than 50 or greater than 120, respiratory rate less than 12 or greater than 25,systolic BP less than 90 or greater than 177, diastolic BP less than 50 or greater than 100. Objective     Sit to Stand: Modified independent  Stand to sit: Modified independent  Bed to Chair: Modified independent  Stand Pivot Transfers:  Moderate Assistance  Device: Rolling Walker  Assistance: Contact guard assistance  Distance: 19 feet x2   OT  PT Equipment Recommendations  Equipment Needed: Yes  Mobility Devices: Wheelchair  Lorrane Fruit: Rolling  Wheelchair: Standard  Other: will continue to assess  Toilet - Technique: Stand pivot  Equipment Used: Standard toilet(grab bars)  Assessment        SLP                Body mass index is 22.76 kg/m². Rehabilitation Diagnosis:   Orthopedic, 8.11, Unilateral Hip Fracture        Assessment and Plan:     Right acetabulum, inferior/superior pubic rami, and sacral fractures - Managed non-operatively. Pain control. TTWB RLE and WBAT LLE. PT/OT.     Intramuscular hematoma (piriformis, obturator) - Monitor Hgb.       COPD exacerbation - Completed steroids. Dulera, spiriva, mucinex, prn duonebs.       HTN - amlodipine     HLD - pravastatin     GERD - pantoprazole     PTSD - Restarted lorazepam at lower dose as he has been off of this for >1 week and is now also on oxycodone. Pending response consider increasing to home dose of 2mg qhs. Doign well so far.      Bladder - high risk retention - Monitor PVRs, SC prn >300cc. Flomax.      Bowel - high risk constipation - colace=senna BID, PRN MoM. follow bowel movements. Enema or suppository if needed.      Pain control - Gabapentin (for chronic neuropathy), baclofen, prn oxycodone     DVT PPx - lovenox    Interdisciplinary team conference was held today with entire rehab treatment team including PT, OT, Dietician, RN, and SW. Discussion focused on progress toward rehab goals and discharge planning. Patient now Mandy with wheelchair mobility, transfers, and ADLs. However patient requesting dc to SNF. Separate conference then held with patient, questions answered and concerns addressed. Total treatment time >35 min with greater than 50% spent in care coordination. Anticipated dispo: SNF if accepted  ELOS: when bed available    Florence Noel MD 4/23/2019, 11:19 AM

## 2019-04-23 NOTE — CARE COORDINATION
Received voicemail from Raj Bloom at [facility] stating they are NOT able to accept patient under the [place] or Medicare.     Dano Suárez MSW, LSW

## 2019-04-23 NOTE — PROGRESS NOTES
Physical Therapy  Facility/Department: [facility]  Daily Treatment Note  NAME: Rosette Hodgson  : 1945  MRN: [id number]    Date of Service: 2019    Discharge Recommendations:  Continue to assess pending progress        Patient Diagnosis(es): There were no encounter diagnoses. has a past medical history of Allergic rhinitis, Anxiety, COPD (chronic obstructive pulmonary disease) ([facility] [address] Emphysema of lung ([facility] [address] GERD (gastroesophageal reflux disease), Hypercholesteremia, Hypertension, Lumbar radiculopathy, and PTSD (post-traumatic stress disorder). has no past surgical history on file. Restrictions  Lower Extremity Weight Bearing Restrictions  Right Lower Extremity Weight Bearing: Flat Foot Weight Bearing(toe touch)  Left Lower Extremity Weight Bearing: Weight Bearing As Tolerated  Position Activity Restriction  Other position/activity restrictions:  AVOID sheer to buttocks, trialling ROHO due to nonintact buttock skin. Flat foot Toe touch weightbearing on RLE, weightbearing as tolerated on LLE. Notify physician for pulse less than 50 or greater than 120, respiratory rate less than 12 or greater than 25,systolic BP less than 90 or greater than 961, diastolic BP less than 50 or greater than 100. pain in pelvis and Right buttock across sessions  Subjective  Patient endorses pain in pelvis and Right buttock across sessions            Orientation: person, place, date. Cognition : follows 2 step instruction and participates throughout session but requires redirection at times as pt appears to  enjoy storytelling and needs intermittent redirection to task.   Employs safety precautions appropriately     Objective    Bed mobility: with pillows wedge,  Indep sup<>sit indep scooting      Transfers  Sit to Stand: Modified independent  Stand to sit: Modified independent  Bed to Chair: Modified independent  Car Transfer: Contact guard assistance(with FWW TDWB foot flat RLE on return demo after demo by therapist)  Ambulation  Ambulation?: Yes  WB Status: Foot flat touch down WB   More Ambulation?: Yes  Ambulation 1  Surface: level tile  Device: Rolling Walker  Assistance:SBA  Quality of Gait: Needs cues for step to gait as pt prefers hop to gait NWB RLE . Pt able to perform after initial cues. Distance: 19 feet x2   Comments: SpO2 87% HR 96bpm immediate post walking with 1 minute PLB SpO2 91% HR 105bpm.  After second walk SpO2 92-94% in first minute [id number].    WC propulsion with left/right turns over 10 feet of carpet on first pass 160 feet x2 SYL with left/right turns and navigating small spaces    Second Session:  Bed Mobility   sit<>sup and sit>sup  indep from WC, indep in scooting  Transfers: WC>Bed  SPT on LLE SYL  WC propulsion:  WC propulsion 500', 400 feet, 100 feet outdoors uneven and ramp up and down 10 feet SYL with BUE, indep in manage BUE. Seated LE therex:  LAQ x30 BLE and ankle pumps BLE x30. Patient  required brief rest after self propulsion with pt with SpO2 92% or greater post activity. LE therex: LAQ x30 BLE, ankle pumps x30 BLE seated in WC indep   Assessment  Patient seen for split treatment for transfer train, WC propulsion endurance and LE therex. See updated goals for progress. Patient  now SYL at [facility] level with bed mobility, gait transfer distances, transfers with use of FWW from/to bed and WC and CGA with FWW for car transfers. Continue therapy towards SYL with all goals and to increase endurance with mobility as PLOF was community independent without AD. Activity Tolerance  Activity Tolerance: manual BP /42 HR 80bpm, SpO2 93% PT rates pain in right groin and buttock as 8/10             Goals  Short term goals  Time Frame for Short term goals: To be met by 4/18/19  Short term goal 1: Pt to perform supine to/from sit transfers at flat bed without rails with supervision. GOAL met 4/15/2019 Pt demo supervision/SBA for sup<>sit transfers.    Short term goal 2: Pt to perform sit to/from stand at [facility] with CGA. GOAL met 4/17/2019 CGA with cues for sit<>stand with FWW from mat/chair/WC. Short term goal 3: Pt to ambulate 50 ft with LRAD with CGA for household mobility. Short term goal 4: Pt to propel wheelchair 50 ft on level surface with mod I for household mobility. GOAL MET 4/12/2019 Patient demo 205 feet SYL WC propulsion level and carpet with left/.right tuns and thru doorways  Short term goal 5: Pt to demonstrate car transfer with RW with mod A x 1. GOAL met 4/19/2019 on return demo pt superivsion with car transfer with car transfer simulator  with FWW TDWB foot flat RLE  to/from WC. Long term goals  Time Frame for Long term goals : To be met by 4/25/19  Long term goal 1: Pt to perform supine to/from sit transfers at flat bed without rails with mod I. GOAL MET 4/19/2019 Pt indep sup<>sit from [facility] flat without use of rails   Long term goal 2: Pt to perform sit to/from stand at LRAD with mod I.  Long term goal 3: Pt to propel WC  150 ft with LRAD with mod I for community mobility. 4/22/2019  GOAL met pt demo  feet propulsion with WC including 10 foot ramp left/right turn and carpet. Long term goal 4: Pt to manage 1 curb step with wheelchair or LRAD with mod I for home entry. Long term goal 5: Pt to demonstrate car transfer with RW with min A x 1. GOAL met 4/19/2019 on return demo pt superivsion with car transfer with car transfer simulator  with FWW TDWB foot flat RLE  to/from WC.    Patient Goals   Patient goals : \"to be home and able to drive and do what I was doing\"    Plan    Plan  Times per week: 5/7 days week   Times per day: Daily  Current Treatment Recommendations: Strengthening, Functional Mobility Training, Wheelchair Mobility Training, Neuromuscular Re-education, Home Exercise Program, Equipment Evaluation, Education, & procurement, Safety Education & Training, Gait Training, Transfer Training, ROM, Balance Training, Endurance Training, Stair training, Pain Management, Patient/Caregiver Education & Training, Positioning  Safety Devices  Type of devices:  All fall risk precautions in place, Chair alarm in place, Left in chair, Call light within reach  Restraints  Initially in place: No     Therapy Time   Individual Concurrent Group Co-treatment   Time In 0830         Time Out 0930         Minutes 60         Timed Code Treatment Minutes: [address] Time:   [address] [facility]   Time In 1015         Time Out 1050         Minutes 35           Timed Code Treatment Minutes:  95    Total Treatment Minutes:  95      Magnus Wolff, PT

## 2019-04-23 NOTE — CARE COORDINATION
Spoke with patient at bedside who stated that who he knows will talk to the [address] tomorrow. He still requested writer not update Rebecca Mustafa, his friend until we know for sure what will happen. He is aware that writer [facility], ramp and other DME needed if home is the only option.     Lynne Connelly at [facility] updated    Lacey Guardado MSW, LSW

## 2019-04-23 NOTE — PROGRESS NOTES
VSS - afebrile. Pt is alert and oriented x 4 with history of falls. Assessment completed as charted. Bed is in lowest position with 2/4 bed rails raised, bed alarm turned on, wheels locked and call light within reach - patient wearing non-skid socks and verbalizes understanding to call out for assistance. No further requests at this time. Will continue to monitor.      Vitals:    04/22/19 1949   BP: (!) 132/57   Pulse: 74   Resp: 18   Temp: 98.1 °F (36.7 °C)   SpO2: 90%

## 2019-04-23 NOTE — PROGRESS NOTES
Nutrition Assessment    Type and Reason for Visit: Reassess    Nutrition Recommendations:   · Continue general diet  · Continue Ensure ONS  · Monitor po intakes, nutrition adequacy, weights, pertinent labs, BMs    Nutrition Assessment: Follow up: Pt stable from a nutritional standpoint AEB continued po intakes of % per EMR. Pt reports that his appetite is good and he is tolerating his diet well. Pt voiced no nutrition questions/concerns. Will continue general diet and ONS. Malnutrition Assessment:  · Malnutrition Status: At risk for malnutrition  · Context: Acute illness or injury  · Findings of the 6 clinical characteristics of malnutrition (Minimum of 2 out of 6 clinical characteristics is required to make the diagnosis of moderate or severe Protein Calorie Malnutrition based on AND/ASPEN Guidelines):  1. Energy Intake-(>75% EER PTA per pt report), Greater than or equal to 5 days    2. Weight Loss-Unable to assess, unable to assess  3. Fat Loss-No significant subcutaneous fat loss,    4. Muscle Loss-No significant muscle mass loss,    5. Fluid Accumulation-No significant fluid accumulation,    6.  Strength-Not measured    Nutrition Risk Level: Low    Nutrient Needs:  · Estimated Daily Total Kcal: 5469-2180  · Estimated Daily Protein (g): 79-95  · Estimated Daily Total Fluid (ml/day): 1 ml/kcal or per MD    Nutrition Diagnosis:   · Problem: Inadequate oral intake  · Etiology: related to Early satiety     Signs and symptoms:  as evidenced by Patient report of, Other (Comment)(decreased appetite)    Objective Information:  · Nutrition-Focused Physical Findings: BM x1 on 4/22.  trace BLE edema  · Wound Type: None  · Current Nutrition Therapies:  · Oral Diet Orders: General   · Oral Diet intake: %  · Oral Nutrition Supplement (ONS) Orders: Standard High Calorie Oral Supplement  · ONS intake: 51-75%, %  · Anthropometric Measures:  · Ht: 6' (182.9 cm)   · Current Body Wt: 167 lb 12.8 oz (76.1 kg)  · % Weight Change:  ,  Weight loss possibly fluid related.  Pt -2.5 L since admission  · Ideal Body Wt: 178 lb (80.7 kg),  · BMI Classification: BMI 18.5 - 24.9 Normal Weight    Nutrition Interventions:   Continue current diet, Continue current ONS  Continued Inpatient Monitoring    Nutrition Evaluation:   · Evaluation: Progressing toward goals   · Goals: Pt will have PO intakes of 50% or greater during ARU stay    · Monitoring: Meal Intake, Supplement Intake, Diet Tolerance, Weight, Pertinent Labs      Electronically signed by Han Proctor RD, LD on 4/23/19 at 3:07 PM    Contact Number: Office: [phone]; [address]: [facility]

## 2019-04-23 NOTE — CARE COORDINATION
Writer still has not received a call back from Gillian Lewis at the [place] for home health care. Left another message for Gillian Lewis at [phone] asking for a call back.      Chiki Brooks MSW, LSW

## 2019-04-23 NOTE — CARE COORDINATION
Received call from Lio Agrawal at [facility]) stating that the \"[facility] just now reviewed the notes he sent over yesterday as they were out in the field yesterday and are NOT approving him to go to SNF as notes are stating he can go home\". Inquired if he could go under his Medicare Benefits. He stated he would need to review and let the writer know.      Hakeem Heck MSW, LSW

## 2019-04-23 NOTE — CARE COORDINATION
Received call from Yrn scales at the [facility] SNF stating they have no open beds.      Irma Mancini MSW, LSW

## 2019-04-23 NOTE — PROGRESS NOTES
Occupational Therapy  Facility/Department: [facility] ARU  Daily Treatment Note  NAME: Maday Armenta  : 1945  MRN: [id number]    Date of Service: 2019    Discharge Recommendations:  Home with Home health OT, Home with assist PRN  OT Equipment Recommendations  Other: will likely need shower chair, possible RTS as needed    Assessment   Performance deficits / Impairments: Decreased functional mobility ; Decreased ADL status; Decreased strength;Decreased safe awareness;Decreased endurance;Decreased balance  Assessment: Pt pleasant and cooperative, completes transfers, toileting, grooming MOd I this date. Pt also mod I for w/c mgmt room<>healing garden >200'. Pt remains SOB with min exertion requiring increased rest breaks, SPO2 on RA >92%, pt coughing up thich green sputum during both sessions. COnt POC. Patient Education: OT role, ADLs, transfers, WB status, AE for LB ADls (reluctant to use)  Activity Tolerance  Activity Tolerance: Patient Tolerated treatment well  Activity Tolerance: increased pain with coughing  Safety Devices  Safety Devices in place: Yes  Type of devices: Bed alarm in place; Patient at risk for falls; Left in bed;Call light within reach         Patient Diagnosis(es): There were no encounter diagnoses. has a past medical history of Allergic rhinitis, Anxiety, COPD (chronic obstructive pulmonary disease) ([address] Emphysema of lung ([address] GERD (gastroesophageal reflux disease), Hypercholesteremia, Hypertension, Lumbar radiculopathy, and PTSD (post-traumatic stress disorder). has no past surgical history on file. Restrictions  Lower Extremity Weight Bearing Restrictions  Right Lower Extremity Weight Bearing: Flat Foot Weight Bearing(toe touch)  Left Lower Extremity Weight Bearing: Weight Bearing As Tolerated  Position Activity Restriction  Other position/activity restrictions:  AVOID sheer to buttocks, trialling ROHO due to nonintact buttock skin.   Flat foot Toe touch weightbearing on RLE, weightbearing as tolerated on LLE. Notify physician for pulse less than 50 or greater than 120, respiratory rate less than 12 or greater than 25,systolic BP less than 90 or greater than 471, diastolic BP less than 50 or greater than 100. Subjective   General  Chart Reviewed: Yes  Patient assessed for rehabilitation services?: Yes  Additional Pertinent Hx: anxiety, COPD, emphysema, GERD, HTN, PTSD, lumbar radiculopathy, pelvic fx, sacral fx, fracture of R hip, pubic rami fx, laryngeal CA s/p sx, RCC s/p partial nephrectomy  Family / Caregiver Present: No  Referring Practitioner: Josue Flores MD  Diagnosis: fall, s/p multiple fractures  Subjective  Subjective: Pt EOB, agreeable  General Comment  Comments: RN cleared for treatment  Pain Assessment  Pain Level: 7  Pain Type: Acute pain  Pain Location: Hip;Pelvis  Pain Orientation: Right  Pain Descriptors: Aching  Non-Pharmaceutical Pain Intervention(s): Emotional support;Distraction  Vital Signs  Patient Currently in Pain: Yes   Orientation  Orientation  Overall Orientation Status: Within Functional Limits  Objective    ADL  Feeding: Independent  Grooming: Modified independent (seated at sink)  Toileting: Modified independent         Balance  Sitting Balance: Independent  Standing Balance: Modified independent   Standing Balance  Time: <1 min x 6  Activity: transfers  Sit to stand: Modified independent  Stand to sit: Modified independent  Comment: grab bars  Functional Mobility  Functional - Mobility Device: Wheelchair  Activity: To/from bathroom; Other  Assist Level: Modified independent   Toilet Transfers  Toilet - Technique: Stand pivot  Equipment Used: Standard toilet(grab bars)  Toilet Transfer: Modified independent     Transfers  Stand Pivot Transfers: Modified independent  Sit to stand: Modified independent  Stand to sit: Modified independent  Transfer Comments: pt able to adhere to WB status with SPTs and sit to stands Individual Concurrent Group Co-treatment   Time In 1230         Time Out 1340         Minutes 70         Timed Code Treatment Minutes: Dieudonne 49, OT

## 2019-04-24 PROCEDURE — 97110 THERAPEUTIC EXERCISES: CPT

## 2019-04-24 PROCEDURE — 6370000000 HC RX 637 (ALT 250 FOR IP): Performed by: PHYSICAL MEDICINE & REHABILITATION

## 2019-04-24 PROCEDURE — 94640 AIRWAY INHALATION TREATMENT: CPT

## 2019-04-24 PROCEDURE — 97530 THERAPEUTIC ACTIVITIES: CPT

## 2019-04-24 PROCEDURE — 1280000000 HC REHAB R&B

## 2019-04-24 PROCEDURE — 6360000002 HC RX W HCPCS: Performed by: PHYSICAL MEDICINE & REHABILITATION

## 2019-04-24 PROCEDURE — 97116 GAIT TRAINING THERAPY: CPT

## 2019-04-24 PROCEDURE — 97535 SELF CARE MNGMENT TRAINING: CPT

## 2019-04-24 RX ADMIN — GUAIFENESIN 600 MG: 600 TABLET, EXTENDED RELEASE ORAL at 17:44

## 2019-04-24 RX ADMIN — VITAMIN D, TAB 1000IU (100/BT) 1000 UNITS: 25 TAB at 08:04

## 2019-04-24 RX ADMIN — SENNOSIDES AND DOCUSATE SODIUM 1 TABLET: 8.6; 5 TABLET ORAL at 21:52

## 2019-04-24 RX ADMIN — BACLOFEN 10 MG: 10 TABLET ORAL at 21:52

## 2019-04-24 RX ADMIN — Medication 2 PUFF: at 07:31

## 2019-04-24 RX ADMIN — ENOXAPARIN SODIUM 40 MG: 40 INJECTION SUBCUTANEOUS at 08:03

## 2019-04-24 RX ADMIN — ASPIRIN 81 MG 81 MG: 81 TABLET ORAL at 08:04

## 2019-04-24 RX ADMIN — Medication 2 PUFF: at 20:17

## 2019-04-24 RX ADMIN — BACLOFEN 10 MG: 10 TABLET ORAL at 08:04

## 2019-04-24 RX ADMIN — OXYCODONE HYDROCHLORIDE 10 MG: 5 TABLET ORAL at 21:53

## 2019-04-24 RX ADMIN — DIPHENHYDRAMINE HCL 25 MG: 25 TABLET ORAL at 21:52

## 2019-04-24 RX ADMIN — PANTOPRAZOLE SODIUM 40 MG: 40 TABLET, DELAYED RELEASE ORAL at 06:38

## 2019-04-24 RX ADMIN — TIOTROPIUM BROMIDE 18 MCG: 18 CAPSULE ORAL; RESPIRATORY (INHALATION) at 07:31

## 2019-04-24 RX ADMIN — OXYCODONE HYDROCHLORIDE 10 MG: 5 TABLET ORAL at 08:03

## 2019-04-24 RX ADMIN — OXYCODONE HYDROCHLORIDE 10 MG: 5 TABLET ORAL at 17:44

## 2019-04-24 RX ADMIN — SENNOSIDES AND DOCUSATE SODIUM 1 TABLET: 8.6; 5 TABLET ORAL at 08:04

## 2019-04-24 RX ADMIN — GABAPENTIN 1200 MG: 300 CAPSULE ORAL at 21:52

## 2019-04-24 RX ADMIN — TAMSULOSIN HYDROCHLORIDE 0.4 MG: 0.4 CAPSULE ORAL at 08:04

## 2019-04-24 RX ADMIN — GABAPENTIN 1200 MG: 300 CAPSULE ORAL at 08:03

## 2019-04-24 RX ADMIN — AMLODIPINE BESYLATE 5 MG: 5 TABLET ORAL at 08:04

## 2019-04-24 RX ADMIN — BACLOFEN 10 MG: 10 TABLET ORAL at 12:42

## 2019-04-24 RX ADMIN — PRAVASTATIN SODIUM 40 MG: 40 TABLET ORAL at 21:52

## 2019-04-24 RX ADMIN — GABAPENTIN 1200 MG: 300 CAPSULE ORAL at 12:42

## 2019-04-24 RX ADMIN — MAGNESIUM GLUCONATE 500 MG ORAL TABLET 400 MG: 500 TABLET ORAL at 08:03

## 2019-04-24 RX ADMIN — LORAZEPAM 1 MG: 1 TABLET ORAL at 21:52

## 2019-04-24 RX ADMIN — OXYCODONE HYDROCHLORIDE 10 MG: 5 TABLET ORAL at 12:42

## 2019-04-24 RX ADMIN — GUAIFENESIN 600 MG: 600 TABLET, EXTENDED RELEASE ORAL at 08:03

## 2019-04-24 ASSESSMENT — PAIN DESCRIPTION - PROGRESSION
CLINICAL_PROGRESSION: GRADUALLY WORSENING

## 2019-04-24 ASSESSMENT — PAIN DESCRIPTION - PAIN TYPE
TYPE: ACUTE PAIN

## 2019-04-24 ASSESSMENT — PAIN SCALES - GENERAL
PAINLEVEL_OUTOF10: 8
PAINLEVEL_OUTOF10: 9
PAINLEVEL_OUTOF10: 5
PAINLEVEL_OUTOF10: 8
PAINLEVEL_OUTOF10: 9
PAINLEVEL_OUTOF10: 8
PAINLEVEL_OUTOF10: 4
PAINLEVEL_OUTOF10: 6

## 2019-04-24 ASSESSMENT — PAIN DESCRIPTION - FREQUENCY
FREQUENCY: CONTINUOUS

## 2019-04-24 ASSESSMENT — PAIN - FUNCTIONAL ASSESSMENT
PAIN_FUNCTIONAL_ASSESSMENT: PREVENTS OR INTERFERES SOME ACTIVE ACTIVITIES AND ADLS
PAIN_FUNCTIONAL_ASSESSMENT: ACTIVITIES ARE NOT PREVENTED
PAIN_FUNCTIONAL_ASSESSMENT: PREVENTS OR INTERFERES SOME ACTIVE ACTIVITIES AND ADLS

## 2019-04-24 ASSESSMENT — PAIN DESCRIPTION - ONSET
ONSET: ON-GOING

## 2019-04-24 ASSESSMENT — PAIN DESCRIPTION - LOCATION
LOCATION: HIP
LOCATION: PELVIS;HIP
LOCATION: HIP;PELVIS
LOCATION: PELVIS;HIP
LOCATION: HIP

## 2019-04-24 ASSESSMENT — PAIN DESCRIPTION - DESCRIPTORS
DESCRIPTORS: ACHING

## 2019-04-24 ASSESSMENT — PAIN DESCRIPTION - ORIENTATION
ORIENTATION: RIGHT

## 2019-04-24 NOTE — CARE COORDINATION
[facility] [employer]. Placed call back to her at [phone] asking for a call back to follow up on the ramp, wheelchair with swing back arms and elevated leg rest, Rolling Walker, Shower Chair and home health care.      Tabitha Sanchez MSW, LSW

## 2019-04-24 NOTE — CARE COORDINATION
SW has not received call back from [facility] with the [employer] and 3 messages have been left. Placed call to the [place] [facility] at [phone] and spoke with Carrol Pascual. He stated he received the message left on 4-23-19 and forwarded it to the nurse manager Haleigh (unsure of spelling). Carrol Pascual transferred writer to [employer], Nurse Manager. Left a message asking for a call back. Barrier: Hearing back from the [place] in regards to the Ramp, [address] and wheelchair with swing back arms and elevated leg rest, Rolling Walker and Shower Chair.     Aman Zavala MSW, LSW

## 2019-04-24 NOTE — CARE COORDINATION
SW spoke with patient at bedside to notify him that writer made contact with Julio Harmon,  with his [facility] PCP and the Ramp will take longer to install. Provided him with information on [employer] has he needs to see if he could get one. Encouraged him to let writer know ASAP about the ramp as it is needed for home. Patient stated he was going to make a call to the Outpatient Advocate at the [facility]. Left message for Julio Harmon at the [facility] to confirm she received the fax from the [facility].      Aneta Brought MSW, LSW

## 2019-04-24 NOTE — CARE COORDINATION
Received voicemail from Jv altman (unsure of spelling), [facility] Manager who stated she spoke with Cara Jackson (patient's ) and she will be calling writer back within the hour to discuss what is needed for the patient.  Jv altman stated if no call received, to call her back at [facility] [place] [facility]\""

## 2019-04-24 NOTE — PROGRESS NOTES
Occupational Therapy  Facility/Department: [facility] ARU  Daily Treatment Note  NAME: Katja Caal  : 1945  MRN: [id number]    Date of Service: 2019    Discharge Recommendations:  Home with Home health OT, Home with assist PRN(\"friend\" wants pt to discharge to SNF)       Assessment   Performance deficits / Impairments: Decreased functional mobility ; Decreased ADL status; Decreased strength;Decreased safe awareness;Decreased endurance;Decreased balance  Assessment: Pt cooperative, friend present during 1st session and reports to therapist \"Don't you know his true baseline, you need to be documenting on it, he's clearly not there. If Carmelo Hendrickson (pt's sone with DS) knows he is home, he will want to be there and Josh Garcia can't care for him right now. There is a potential that he could fall at home\". Son is currently staying with friends. Pt distracted but able to redirect to perform some B UE ex with red theraband. Educated friend that pt is mod I at w/c level and will require w/c, as he will likely not have upgraded WB for a while. Friend stated \"I know\", but cont to press for pt to discharge to SNF. Second session: Pt supine, agreeable. Pt complete EOB<>w/c <>toilet transfers mod I with grab bars, performs grooming seated at sink in w/c. Pt completes toileting mod I, able to doff B socks, doff/larissa B slip on shoes and larissa L sock, assist for R sock d/t pt declining to use AE for LB ADLs. Pt left in bed at EOS. COnt POC. Patient Education: OT role, ADLs, transfers, WB status, AE for LB ADls (reluctant to use)  REQUIRES OT FOLLOW UP: Yes  Activity Tolerance  Activity Tolerance: Patient Tolerated treatment well  Activity Tolerance: increased pain with coughing         Patient Diagnosis(es): There were no encounter diagnoses.       has a past medical history of Allergic rhinitis, Anxiety, COPD (chronic obstructive pulmonary disease) ([address] Emphysema of lung ([address] GERD (gastroesophageal reflux disease), Hypercholesteremia, Hypertension, Lumbar radiculopathy, and PTSD (post-traumatic stress disorder). has no past surgical history on file. Restrictions  Lower Extremity Weight Bearing Restrictions  Right Lower Extremity Weight Bearing: Flat Foot Weight Bearing(toe touch)  Left Lower Extremity Weight Bearing: Weight Bearing As Tolerated  Position Activity Restriction  Other position/activity restrictions:  AVOID sheer to buttocks, trialling ROHO due to nonintact buttock skin. Flat foot Toe touch weightbearing on RLE, weightbearing as tolerated on LLE. Notify physician for pulse less than 50 or greater than 120, respiratory rate less than 12 or greater than 25,systolic BP less than 90 or greater than 669, diastolic BP less than 50 or greater than 100. Subjective   General  Chart Reviewed: Yes  Patient assessed for rehabilitation services?: Yes  Additional Pertinent Hx: anxiety, COPD, emphysema, GERD, HTN, PTSD, lumbar radiculopathy, pelvic fx, sacral fx, fracture of R hip, pubic rami fx, laryngeal CA s/p sx, RCC s/p partial nephrectomy  Family / Caregiver Present: No  Referring Practitioner: Marvin Contreras MD  Diagnosis: fall, s/p multiple fractures  Subjective  Subjective: Pt supine, agreeable   General Comment  Comments: RN cleared for treatment  Pain Assessment  Pain Level: 8  Pain Type: Acute pain  Pain Location: Pelvis; Hip  Vital Signs  Patient Currently in Pain: Yes   Orientation  Orientation  Overall Orientation Status: Within Functional Limits  Objective    ADL  Feeding: Independent  Grooming: Modified independent (seated in w/c at sink)  LE Dressing: Minimal assistance(R sock, able to doff B socks, larissa/doff B slip on shoes)  Toileting: Modified independent   Additional Comments: pt cont to decline to use AE for LB ADLs        Balance  Sitting Balance: Independent  Standing Balance: Modified independent   Standing Balance  Time: <1 min x 6  Activity: transfers  Sit to stand: Modified independent  Stand to sit: Modified independent  Comment: grab bars  Functional Mobility  Functional - Mobility Device: Wheelchair  Activity: To/from bathroom; Other  Assist Level: Modified independent   Toilet Transfers  Toilet - Technique: Stand pivot  Equipment Used: Standard toilet(grab bars)  Toilet Transfer: Modified independent                             Cognition  Memory: Decreased short term memory  Cognition Comment: pt very talkative, needs redirection throughout session                    Type of ROM/Therapeutic Exercise  Type of ROM/Therapeutic Exercise: AROM  Comment: red theraband  Exercises  Scapular Protraction: x20  Scapular Retraction: x20  Shoulder Flexion: x20  Shoulder ABduction: x20  Shoulder ADduction: x20  Horizontal ABduction: x20  Horizontal ADduction: x20  Elbow Flexion: x20  Elbow Extension: x20                    Plan   Plan  Times per week: 5/7 days/wk  Current Treatment Recommendations: Strengthening, Balance Training, Functional Mobility Training, Endurance Training, Patient/Caregiver Education & Training, Equipment Evaluation, Education, & procurement, Self-Care / ADL  G-Code    Goals  Short term goals  Time Frame for Short term goals: to be met by 4/18/19  Short term goal 1: Pt will complete LB ADLs with min A with AE as needed  Short term goal 2: Pt will complete functional transfers with Min A-met 4/15  Short term goal 3: Pt will complete toileting with min A or less-met 4/17  Short term goal 4: pt will adhere to WB status with all transfers and mobility-met 4/18  Long term goals  Time Frame for Long term goals : to be met 4/25/19  Long term goal 1: Pt will complete ADLs Mod I with AE as needed  Long term goal 2: Pt will complete all fucntional transfers Mod I-met 4/23  Long term goal 3: Pt will complete toileting Mod I-met 4/23  Long term goal 4: Pt will tolerate standing at sink for grooming tasks >5 min w/o fatigue or LOB   Long term goal 5: Pt will complete light home mgmt/meal prep tasks Mod I  Patient Goals Patient goals : \"go home, take care of myself\"         Therapy Time   Individual Concurrent Group Co-treatment   Time In 1100         Time Out 1130         Minutes 30         Timed Code Treatment Minutes: 30 Minutes    Therapy Time   Individual Concurrent Group Co-treatment   Time In 1230         Time Out 1340         Minutes 70         Timed Code Treatment Minutes: Dieudonne 49, OT

## 2019-04-24 NOTE — PROGRESS NOTES
Physical Therapy  Facility/Department: [facility] ARU  Daily Treatment Note  NAME: Keaton Mancia  : 1945  MRN: [id number]    Date of Service: 2019    Discharge Recommendations:  Continue to assess pending progress        Patient Diagnosis(es): There were no encounter diagnoses. has a past medical history of Allergic rhinitis, Anxiety, COPD (chronic obstructive pulmonary disease) ([facility] [address] Emphysema of lung ([facility] [address] GERD (gastroesophageal reflux disease), Hypercholesteremia, Hypertension, Lumbar radiculopathy, and PTSD (post-traumatic stress disorder). has no past surgical history on file. Restrictions  Lower Extremity Weight Bearing Restrictions  Right Lower Extremity Weight Bearing: Flat Foot Weight Bearing(toe touch)  Left Lower Extremity Weight Bearing: Weight Bearing As Tolerated  Position Activity Restriction  Other position/activity restrictions:  AVOID sheer to buttocks, trialling ROHO due to nonintact buttock skin. Flat foot Toe touch weightbearing on RLE, weightbearing as tolerated on LLE. Notify physician for pulse less than 50 or greater than 120, respiratory rate less than 12 or greater than 25,systolic BP less than 90 or greater than 513, diastolic BP less than 50 or greater than 100. Subjective    Pt reports pain with sitting and walking 8-8.5/10 in right buttock and pelvis. PT offered encouragement and water throughout session. Pt coughing and expectorating phlegm throughout session. Orientation To person, place and date. Cognition/Affect: Pt appears agitated/upset during session repeating, \"The [facility] denied my transfer to a SNF because of the therapy notes . \"      Objective    Sup<>sit from mat with pillows and wedge indep  Sup<>sit with HOBE and rail SYL     Transfers  Sit to Stand: Modified independent across trials x5 from chair/WC/mat  Stand to sit: Modified independent  across trials x5 from chair/WC/mat  Bed to Chair: Modified independent  Comment: SYL transfers bed>WC slides x5  As toelrated with pain limiting reps and ROM, LLE heel slides x15. PT with written hep of LE therex. Assist needed for SAQ set up only. Assessment  Pt seen for session for increased endurance with gait, transfers, WC propulsion, bed mobility and LE therex. Pt continues to be supervision for greater than transfer distances with walking with pt reporting today \"Left knee feels like its going to give out. \"  Pt demonstrates SYL with WC propulsion, transfers and indep bed mobility as noted. Pt appears agitated about [facility] non approval of transfer to SNF. Pt swearing during session due to pain in Right pelvis/buttock with pt reporting pain during session with mobility or sitting to be 8-8.5/10. Pt reports left knee \"feeling of giving out with walking\" limits walking distance. However, Patient  employing appropriate technique for advancing of RLE TDWB today vs hopping with pt needing cues only for not walking past walker cross bar. Continue with current tx to increase functional distances with gait and overall endurance as tolerated. PT ion bed at end of session with bed alarm engaged and call light in reach. Goals  Short term goals  Time Frame for Short term goals: To be met by 4/18/19  Short term goal 1: Pt to perform supine to/from sit transfers at flat bed without rails with supervision. GOAL met 4/15/2019 Pt demo supervision/SBA for sup<>sit transfers. Short term goal 2: Pt to perform sit to/from stand at [facility] with CGA. GOAL met 4/17/2019 CGA with cues for sit<>stand with FWW from mat/chair/WC. Short term goal 3: Pt to ambulate 50 ft with LRAD with CGA for household mobility. GOAL not 4/23/2019 met PT SYL for transfer distance walking but requires supervision/SBA for gait greater distances walks 20 feet wtih FWW RLE footflat TDWB LLE WBAT with further distances limited by Fx (pelvis and acetabulum)  associated pain, WB limitation and limited endurance.    Short term goal 4: Pt to propel wheelchair 50 ft on level surface with mod I for household mobility. GOAL MET 4/12/2019 Patient demo 205 feet SYL WC propulsion level and carpet with left/.right tuns and thru doorways  Short term goal 5: Pt to demonstrate car transfer with RW with mod A x 1. GOAL met 4/19/2019 on return demo pt superivsion with car transfer with car transfer simulator  with FWW TDWB foot flat RLE  to/from WC. Long term goals  Time Frame for Long term goals : To be met by 4/25/19  Long term goal 1: Pt to perform supine to/from sit transfers at flat bed without rails with mod I. GOAL MET 4/19/2019 Pt indep sup<>sit from [facility] flat without use of rails   Long term goal 2: Pt to perform sit to/from stand at LRAD with mod I. GOAL met 4/23/2019 pt demo  SYL with sit<.stand with FWW and WBAT LLE and TDWB RLE. Long term goal 3: Pt to propel WC  150 ft with LRAD with mod I for community mobility. 4/22/2019  GOAL met pt demo  feet propulsion with WC including 10 foot ramp left/right turn and carpet. Long term goal 4: Pt to manage 1 curb step with wheelchair or LRAD with mod I for home entry. DC this goal4/23/2019 and recommend ramped entrance as suggested to patient  as pt not appriate for steps from safety perspective due to pelvic Fx acetabular Fx with WBAT LLE and TDWB RLE with foot flat. Long term goal 5: /  Patient Goals   Patient goals : \"to be home and able to drive and do what I was doing\"    Plan    Plan  Times per week: 5/7 days week   Times per day: Daily  Current Treatment Recommendations: Strengthening, Functional Mobility Training, Wheelchair Mobility Training, Neuromuscular Re-education, Home Exercise Program, Equipment Evaluation, Education, & procurement, Safety Education & Training, Gait Training, Transfer Training, ROM, Balance Training, Endurance Training, Stair training, Pain Management, Patient/Caregiver Education & Training, Positioning  Safety Devices  Type of devices:  All fall risk precautions in place, Chair alarm in place, Left in chair, Call light within reach  Restraints  Initially in place: No     Therapy Time   Individual Concurrent Group Co-treatment   Time In 0830         Time Out 1000         Minutes 90         Timed Code Treatment Minutes: 90 Minutes       Apryl Lerner PT

## 2019-04-24 NOTE — CARE COORDINATION
Received call from Cipriano Colbert at the [facility] (patient's PCP) office. She is aware of patient's admitting diagnosis and that he has fractures per H&P from Dr. Dheeraj Donato on 4-. MARIBEL explained that patient needs a Ramp to enter the house, wheelchair with swing back arms and elevated leg rest, Rolling Walker and Shower Chair    She requested the DME orders be faxed to her at [phone]. She needs an MD note from today stating why patient needs home Nursing. Cipriano Colbert stated that she will reach out to the contacts on the Ramp to determine how long it will take to arrange the Ramp. Explained to her that writer has been attempting to make contact with her for several days. MARIBEL faxed DME orders to Cipriano Colbert at [phone]    At 1:00pm: MARIBEL fax did not send to Cipriano Colbert. Placed call to Cipriano Colbert who requested writer send orders to [phone].  Sent orders to this fax number    Zoe Rivas MSW, LSW

## 2019-04-24 NOTE — CARE COORDINATION
Met with patient and his friend [place] per request of James Griffin OT. He was working with James Griffin at this time. Explained to patient that [facility] talks directly to the [place] on approval or denial. He requested to know who [facility] spoke with. Placed call to Cole Michael, admissions at [facility] who stated he spoke with Kwabena Mckenzie. Explained patient was wanting to know as he is speaking with an individual at the [place]. Will update Cole Michael at [facility] once patient speaks with his contact at the [place].      Lucy Goel MSW, LSW

## 2019-04-25 LAB
ANION GAP SERPL CALCULATED.3IONS-SCNC: 10 MMOL/L (ref 3–16)
BASOPHILS ABSOLUTE: 0.1 K/UL (ref 0–0.2)
BASOPHILS RELATIVE PERCENT: 0.8 %
BUN BLDV-MCNC: 16 MG/DL (ref 7–20)
CALCIUM SERPL-MCNC: 8.5 MG/DL (ref 8.3–10.6)
CHLORIDE BLD-SCNC: 100 MMOL/L (ref 99–110)
CO2: 30 MMOL/L (ref 21–32)
CREAT SERPL-MCNC: 0.8 MG/DL (ref 0.8–1.3)
EOSINOPHILS ABSOLUTE: 0.4 K/UL (ref 0–0.6)
EOSINOPHILS RELATIVE PERCENT: 5.5 %
GFR AFRICAN AMERICAN: >60
GFR NON-AFRICAN AMERICAN: >60
GLUCOSE BLD-MCNC: 114 MG/DL (ref 70–99)
HCT VFR BLD CALC: 36.6 % (ref 40.5–52.5)
HEMOGLOBIN: 12.5 G/DL (ref 13.5–17.5)
LYMPHOCYTES ABSOLUTE: 1.1 K/UL (ref 1–5.1)
LYMPHOCYTES RELATIVE PERCENT: 16.9 %
MCH RBC QN AUTO: 31.7 PG (ref 26–34)
MCHC RBC AUTO-ENTMCNC: 34.1 G/DL (ref 31–36)
MCV RBC AUTO: 92.8 FL (ref 80–100)
MONOCYTES ABSOLUTE: 0.6 K/UL (ref 0–1.3)
MONOCYTES RELATIVE PERCENT: 9.8 %
NEUTROPHILS ABSOLUTE: 4.4 K/UL (ref 1.7–7.7)
NEUTROPHILS RELATIVE PERCENT: 67 %
PDW BLD-RTO: 14.4 % (ref 12.4–15.4)
PLATELET # BLD: 456 K/UL (ref 135–450)
PMV BLD AUTO: 7.4 FL (ref 5–10.5)
POTASSIUM REFLEX MAGNESIUM: 4.2 MMOL/L (ref 3.5–5.1)
RBC # BLD: 3.94 M/UL (ref 4.2–5.9)
SODIUM BLD-SCNC: 140 MMOL/L (ref 136–145)
WBC # BLD: 6.5 K/UL (ref 4–11)

## 2019-04-25 PROCEDURE — 6360000002 HC RX W HCPCS: Performed by: PHYSICAL MEDICINE & REHABILITATION

## 2019-04-25 PROCEDURE — 1280000000 HC REHAB R&B

## 2019-04-25 PROCEDURE — 85025 COMPLETE CBC W/AUTO DIFF WBC: CPT

## 2019-04-25 PROCEDURE — 36415 COLL VENOUS BLD VENIPUNCTURE: CPT

## 2019-04-25 PROCEDURE — 97530 THERAPEUTIC ACTIVITIES: CPT

## 2019-04-25 PROCEDURE — 6370000000 HC RX 637 (ALT 250 FOR IP): Performed by: PHYSICAL MEDICINE & REHABILITATION

## 2019-04-25 PROCEDURE — 97116 GAIT TRAINING THERAPY: CPT

## 2019-04-25 PROCEDURE — 97535 SELF CARE MNGMENT TRAINING: CPT

## 2019-04-25 PROCEDURE — 94640 AIRWAY INHALATION TREATMENT: CPT

## 2019-04-25 PROCEDURE — 80048 BASIC METABOLIC PNL TOTAL CA: CPT

## 2019-04-25 RX ADMIN — OXYCODONE HYDROCHLORIDE 10 MG: 5 TABLET ORAL at 17:14

## 2019-04-25 RX ADMIN — GABAPENTIN 1200 MG: 300 CAPSULE ORAL at 21:32

## 2019-04-25 RX ADMIN — ENOXAPARIN SODIUM 40 MG: 40 INJECTION SUBCUTANEOUS at 08:31

## 2019-04-25 RX ADMIN — OXYCODONE HYDROCHLORIDE 10 MG: 5 TABLET ORAL at 08:35

## 2019-04-25 RX ADMIN — MAGNESIUM GLUCONATE 500 MG ORAL TABLET 400 MG: 500 TABLET ORAL at 08:34

## 2019-04-25 RX ADMIN — OXYCODONE HYDROCHLORIDE 10 MG: 5 TABLET ORAL at 21:32

## 2019-04-25 RX ADMIN — GUAIFENESIN 600 MG: 600 TABLET, EXTENDED RELEASE ORAL at 08:33

## 2019-04-25 RX ADMIN — OXYCODONE HYDROCHLORIDE 10 MG: 5 TABLET ORAL at 12:32

## 2019-04-25 RX ADMIN — Medication 2 PUFF: at 20:54

## 2019-04-25 RX ADMIN — TAMSULOSIN HYDROCHLORIDE 0.4 MG: 0.4 CAPSULE ORAL at 08:34

## 2019-04-25 RX ADMIN — BACLOFEN 10 MG: 10 TABLET ORAL at 13:35

## 2019-04-25 RX ADMIN — MAGNESIUM HYDROXIDE 30 ML: 400 SUSPENSION ORAL at 08:00

## 2019-04-25 RX ADMIN — Medication 2 PUFF: at 07:35

## 2019-04-25 RX ADMIN — TIOTROPIUM BROMIDE 18 MCG: 18 CAPSULE ORAL; RESPIRATORY (INHALATION) at 07:35

## 2019-04-25 RX ADMIN — PRAVASTATIN SODIUM 40 MG: 40 TABLET ORAL at 21:32

## 2019-04-25 RX ADMIN — PANTOPRAZOLE SODIUM 40 MG: 40 TABLET, DELAYED RELEASE ORAL at 05:46

## 2019-04-25 RX ADMIN — SENNOSIDES AND DOCUSATE SODIUM 1 TABLET: 8.6; 5 TABLET ORAL at 08:34

## 2019-04-25 RX ADMIN — LORAZEPAM 1 MG: 1 TABLET ORAL at 21:32

## 2019-04-25 RX ADMIN — ASPIRIN 81 MG 81 MG: 81 TABLET ORAL at 08:35

## 2019-04-25 RX ADMIN — BACLOFEN 10 MG: 10 TABLET ORAL at 08:33

## 2019-04-25 RX ADMIN — AMLODIPINE BESYLATE 5 MG: 5 TABLET ORAL at 08:34

## 2019-04-25 RX ADMIN — DIPHENHYDRAMINE HCL 25 MG: 25 TABLET ORAL at 21:32

## 2019-04-25 RX ADMIN — BACLOFEN 10 MG: 10 TABLET ORAL at 21:32

## 2019-04-25 RX ADMIN — GABAPENTIN 1200 MG: 300 CAPSULE ORAL at 13:35

## 2019-04-25 RX ADMIN — VITAMIN D, TAB 1000IU (100/BT) 1000 UNITS: 25 TAB at 08:33

## 2019-04-25 RX ADMIN — GUAIFENESIN 600 MG: 600 TABLET, EXTENDED RELEASE ORAL at 17:13

## 2019-04-25 RX ADMIN — SENNOSIDES AND DOCUSATE SODIUM 1 TABLET: 8.6; 5 TABLET ORAL at 21:32

## 2019-04-25 ASSESSMENT — PAIN DESCRIPTION - PAIN TYPE
TYPE: ACUTE PAIN
TYPE: ACUTE PAIN

## 2019-04-25 ASSESSMENT — PAIN SCALES - GENERAL
PAINLEVEL_OUTOF10: 8

## 2019-04-25 ASSESSMENT — PAIN DESCRIPTION - LOCATION
LOCATION: HIP;PELVIS
LOCATION: PELVIS;HIP

## 2019-04-25 ASSESSMENT — PAIN DESCRIPTION - ORIENTATION: ORIENTATION: RIGHT

## 2019-04-25 ASSESSMENT — PAIN DESCRIPTION - DESCRIPTORS: DESCRIPTORS: ACHING

## 2019-04-25 NOTE — CARE COORDINATION
Met with patient to go over plans. He again stated that there is no one that can  his DME items from the [place] and they will need to be shipped. Inquired if he could rent or purchase a ramp from [employer] until the [place] comes out to his house to do an evaluation. He stated that someone would need to measure. Patient appeared frustrated as the recommendations are for home. Explained to him they recommend what they feel patient needs. He expressed concerns about his son Nasrin Sim and the amount of stairs there are to get to his bedroom. He stated that he spoke with the Patient Advocate at the [place] and they told him that if the recommendations are for home, then they won't approve him. SW even discussed self paying for SNF or if he could stay at another location. He stated that there is no one he can stay with as their stairs, etc. He was open to writer finding out the cost to self pay for SNF but likely won't be an option. SW did explain that eventually he will not qualify for the ARU and insurance will not continue to cover his stay. He stated understanding and \"Then I will be in the parking lot\". Abdias's patient son and Duc Rashid patient's friend arrived to patient's room. Patient requested writer come back after they leave the ARU. SW offered supportive listening throughout the conversation. Spoke with Brandie Gunter at [facility] who stated it is 250 dollars a day and they would need 30 days up front. He would be charged for MD visits, medications and therapy if VA doesn't approve Outpatient visits. Will update patient once his visitors leave. Tahira Iglesias PT at the [facility] ([address]) that the items will need to be shipped to the patient. Francisco Meke stated that the Shower Chair has been ordered and will go to his house. Francisco Meek stated that the Wheelchair and Augie Lancaster will be placed today but will be delivered at the earliest by Monday.  Spoke with Taz Stinson PT who stated patient only needs foot petals and not elevating leg rest (updated Eddie). He stated someone will need to be home to get the DME. Inquired if items could be delivered to his [address] [facility] and he stated yes. Provided him with Address to [facility], Room Number, writers contact number and weekend number in case it would come over the weekend. Jose Hernandez again stated he would place an order for a Ramp at home and is aware he has one step to enter. Someone from the [place] will need to see this at home.      Jody Batista MSW, LSW

## 2019-04-25 NOTE — PROGRESS NOTES
[facility]  4/25/2019  [id number]    Chief Complaint: Debility    Subjective:   No acute events overnight. Patient seen this afternoon sitting up in room, finishing OT session. He remains very frustrated by discharge planning. Feels he is unable to go home as he will not be able to care for his adult son with MRDD. He is requesting that therapists change their documentation so that he can go to SNF. I discussed with therapists who report patient is Mandy from wheelchair level. Informed patient that therapists cannot commit fraud and falsify their documentation. He became angry with me and did not want to discuss further. SW and I did bring up alternative options such as son staying with friends (where he has been since patient has been hospitalized) or patient private paying for SNF.     ROS: No cp, sob, n/v  Objective:  Patient Vitals for the past 24 hrs:   BP Temp Temp src Pulse Resp SpO2 Weight   04/25/19 0815 133/62 98.1 °F (36.7 °C) Oral 84 16 90 % --   04/25/19 0158 -- -- -- -- -- -- 166 lb 8 oz (75.5 kg)   04/24/19 2130 (!) 108/56 97.9 °F (36.6 °C) Oral 67 16 93 % --   04/24/19 2017 -- -- -- -- -- 91 % --     Gen: No distress, pleasant. HEENT: Normocephalic, atraumatic. CV: Regular rate and rhythm. Resp: No respiratory distress. Abd: Soft, nontender   Ext: No edema. Neuro: Alert, oriented, appropriately interactive.    Wt Readings from Last 3 Encounters:   04/25/19 166 lb 8 oz (75.5 kg)       Laboratory data:   Lab Results   Component Value Date    WBC 6.5 04/25/2019    HGB 12.5 (L) 04/25/2019    HCT 36.6 (L) 04/25/2019    MCV 92.8 04/25/2019     (H) 04/25/2019       Lab Results   Component Value Date     04/25/2019    K 4.2 04/25/2019     04/25/2019    CO2 30 04/25/2019    BUN 16 04/25/2019    CREATININE 0.8 04/25/2019    GLUCOSE 114 04/25/2019    CALCIUM 8.5 04/25/2019        Therapy progress:  PT  Position Activity Restriction  Other position/activity restrictions:  AVOID sheer to buttocks, trialling ROHO due to nonintact buttock skin. Flat foot Toe touch weightbearing on RLE, weightbearing as tolerated on LLE. Notify physician for pulse less than 50 or greater than 120, respiratory rate less than 12 or greater than 25,systolic BP less than 90 or greater than 538, diastolic BP less than 50 or greater than 100. Objective     Sit to Stand: Modified independent  Stand to sit: Modified independent  Bed to Chair: Modified independent  Stand Pivot Transfers: Modified independent(with use of bed rail from  bed>WC)  Device: Rolling Walker  Assistance: Stand by assistance  Distance: 25 feet  OT  PT Equipment Recommendations  Equipment Needed: Yes  Mobility Devices: Wheelchair  Walker: Rolling  Wheelchair: Standard  Other: will continue to assess  Toilet - Technique: Stand pivot  Equipment Used: Standard toilet(grab bars)  Assessment        SLP                Body mass index is 22.58 kg/m². Rehabilitation Diagnosis:   Orthopedic, 8.11, Unilateral Hip Fracture        Assessment and Plan:     Right acetabulum, inferior/superior pubic rami, and sacral fractures - Managed non-operatively. Pain control. TTWB RLE and WBAT LLE. PT/OT.     Intramuscular hematoma (piriformis, obturator) - Monitor Hgb.       COPD exacerbation - Completed steroids. Dulera, spiriva, mucinex, prn duonebs.       HTN - amlodipine     HLD - pravastatin     GERD - pantoprazole     PTSD - Restarted lorazepam at lower dose as he has been off of this for >1 week and is now also on oxycodone. Doing well on this dose and does not feel he needs home dose of 2mg.      Bladder - high risk retention - Monitor PVRs, SC prn >300cc. Flomax.      Bowel - high risk constipation - colace=senna BID, PRN MoM. follow bowel movements. Enema or suppository if needed.      Pain control - Gabapentin (for chronic neuropathy), baclofen, prn oxycodone     DVT PPx - lovenox      Anticipated dispo: Home.  Patient requesting SNF but not accepted due to functional level. Patient discussing this with patient advocate at [facility]. Was scheduled for DC 4/26 however his equipment has not been obtained from the [facility]. Our SW has been contacting [facility] frequently for >2 weeks and equipment has still not been arranged. This is the only reason for delay in discharge, unsafe to dc home without necessary equipment. Anticipating equipment will not be ready until next week. Georgi Justice.  Berkley Rinne, MD 4/25/2019, 12:02 PM

## 2019-04-25 NOTE — CARE COORDINATION
Updated patient of cost to self pay for SNF at [facility] and that his Wheelchair/Rolling walker will be delivered to his room at the earliest of Monday. He stated \"if I go home, Jono Bowden will have to get me up over the step\" when talking about the ramp. Also explained to patient that Angela Rivera PT at the [place] asked about Di Ravindra and Louise Orr stated he was safe but did not state who he was staying with. Explained that the [place] SNF has no beds when he inquired.      Lance Barlcay MSW, LSW

## 2019-04-25 NOTE — PROGRESS NOTES
First session   Bed mobility  Rolling to Left: Independent  Supine to Sit: Independent  Sit to Supine: Independent  Scooting: Independent  Comment: Patient requests use of WC  for tranport into bathroom and HOBE with bed rail for trasnfer without use of FWW. However pt agreable tin gym to transfer to mat (low) with FWW to from [address]. PT notes he has a hospital bed at home. Transfers  Sit to Stand: Modified independent  Stand to sit: Modified independent  Bed to Chair: Modified independent  Stand Pivot Transfers: Modified independent(with use of bed rail from  bed>WC)   WC propulsion BUE 20 feet up and down ramp and 650 feet with WC propulsion SYL with carpet /left right turns and doorways indep. Car transfer Set up only to open and close car door and retrieve FWW otherwise SYL for trasnfer WC<>passenger seat with pt able to indep slide to drivers side nd back to passenger side. [id number] BLE   Post transfer SpO2 88% with brief 1 minute rest break SpO2 PLB up to 90% and after 2min PLB 95%. Pt with break in am session due to call from [address] on equipment during session. Pt  Taking extensive time transferring in and out of bathroom this date due to coughing, expectorating phlegm and brushing teeth and rinsing mouth (independently)  Second Session:  WC propulsion 160 feet SYL with left/right turns  SpO2 88% immediate post propulsion with need for brief rest break of 1 minute for sPO2 90% or greater with PLB  Transfer bed<>WC with hobe and rail SYL, transfer mat<>WC SYL with FWW RLE foot flat TTWB. Gait 25 feet x2 with cues for first trial only for advancing walker far enough so RLE is not past cross bar with SBA FWW RLE foot flat TTWB for greater than transfer distances. PT required seated rest psot walking due to sob. Sit<>stand from WC/Mat SYL     Assessment  Pt seen for split session this date with gait train, and activities to increase functional endurance.   Pt coughing, expectorating during tx with brief periods of SpO2 less than 90% with rebound to 90% or greater with PLB as noted. .  Pt with c/o this date of left rib pain as well as pelvic pain. PT with improved technique with gait this date with fewer cue with FWW for TDWB foot flat RLE and WBAT LLE. PT continues to require supervision for distances greater than transfers, is SYL for transfer to [facility] with [address] or with pivot transfer with bed rail and WC. Pt indep in WC propulsion carpet, ramp and level as noted. Pt stated during treatment that \"I have perspective on things that they could be worse. \"  PT will need ramp to enter 1 step to home, recommend home health PT upon advancement of WB orders from ortho and home health aides to assist with higher level ADL's. PT indep in household mobility at a WC level. Goals  Short term goals  Time Frame for Short term goals: To be met by 4/18/19  Short term goal 1: Pt to perform supine to/from sit transfers at flat bed without rails with supervision. GOAL met 4/15/2019 Pt demo supervision/SBA for sup<>sit transfers. Short term goal 2: Pt to perform sit to/from stand at [facility] with CGA. GOAL met 4/17/2019 CGA with cues for sit<>stand with FWW from mat/chair/WC. Short term goal 3: Pt to ambulate 50 ft with LRAD with CGA for household mobility. GOAL not 4/23/2019 met PT SYL for transfer distance walking but requires supervision/SBA for gait greater distances walks 20 feet wtih FWW RLE footflat TDWB LLE WBAT with further distances limited by Fx (pelvis and acetabulum)  associated pain, WB limitation and limited endurance. Short term goal 4: Pt to propel wheelchair 50 ft on level surface with mod I for household mobility. GOAL MET 4/12/2019 Patient demo 205 feet SYL WC propulsion level and carpet with left/.right tuns and thru doorways  Short term goal 5: Pt to demonstrate car transfer with RW with mod A x 1.   GOAL met 4/19/2019 on return demo pt superivsion with car transfer with car transfer simulator  with FWW TDWB foot flat RLE  to/from WC. Long term goals  Time Frame for Long term goals : To be met by 4/25/19  Long term goal 1: Pt to perform supine to/from sit transfers at flat bed without rails with mod I. GOAL MET 4/19/2019 Pt indep sup<>sit from [facility] flat without use of rails   Long term goal 2: Pt to perform sit to/from stand at LRAD with mod I. GOAL met 4/23/2019 pt demo  SYL with sit<.stand with FWW and WBAT LLE and TDWB RLE. Long term goal 3: Pt to propel WC  150 ft with LRAD with mod I for community mobility. 4/22/2019  GOAL met pt demo  feet propulsion with WC including 10 foot ramp left/right turn and carpet. Long term goal 4: Pt to manage 1 curb step with wheelchair or LRAD with mod I for home entry. DC this goal4/23/2019 and recommend ramped entrance as suggested to patient  as pt not appriate for steps from safety perspective due to pelvic Fx acetabular Fx with WBAT LLE and TDWB RLE with foot flat. Long term goal 5: /  Patient Goals   Patient goals : \"to be home and able to drive and do what I was doing\"    Plan    Plan  Times per week: 5/7 days week   Times per day: Daily  Current Treatment Recommendations: Strengthening, Functional Mobility Training, Wheelchair Mobility Training, Neuromuscular Re-education, Home Exercise Program, Equipment Evaluation, Education, & procurement, Safety Education & Training, Gait Training, Transfer Training, ROM, Balance Training, Endurance Training, Stair training, Pain Management, Patient/Caregiver Education & Training, Positioning  Safety Devices  Type of devices:  All fall risk precautions in place, Chair alarm in place, Left in chair, Call light within reach  Restraints  Initially in place: No     Therapy Time   Individual Concurrent Group Co-treatment   Time In 0830         Time Out 0845         Minutes 15         Timed Code Treatment Minutes: 15 Minutes  Second Session Therapy Time:   Individual Concurrent Group Co-treatment   Time In 0900         Time Out 0930         Minutes 30           Timed Code Treatment Minutes:  30  Second Session Therapy Time:   Individual Concurrent Group Co-treatment   Time In 1000         Time Out 1050         Minutes 50           Timed Code Treatment Minutes:  50    Total Treatment Minutes:  95             Fabio Love PT

## 2019-04-25 NOTE — CARE COORDINATION
Met with patient to go over discharge plans for tomorrow. He stated that the Ramp will NOT be installed as he hasn't discussed this with anyone and doesn't see how it would be possible. Explained that Deborah Alegria is waiting to hear back from the [address] on home health care, DME and Ramp. He stated understanding. Writer provided him with his IMM letter for Medicare as he has Medicare Secondary. Placed call to Kailyn Cooper,  at the [place] at [phone]. No answer, message left to follow up on where we stand for needed DME.      Burgess Garland PT made aware    Mora Merlos MSW, LSW

## 2019-04-25 NOTE — CARE COORDINATION
SW with patient and Shireen Moya PT at bedside. He stated that there is no one that could  his DME items from the [place] and they would need to ship them. Explained that it will take 3-5 days to arrive and that they wont do a Ramp until someone comes to their house from the [place]. He asked \"did you hear anything else\". Explained that was nothing mentioned about SNF.  He stated that he will make another call to the [place] [id number] MSW, [facility] [place]

## 2019-04-25 NOTE — PROGRESS NOTES
Occupational Therapy  Facility/Department: [facility]  Daily Treatment Note  NAME: Farooq Younger  : 1945  MRN: [id number]    Date of Service: 2019    Discharge Recommendations:  Home with Home health OT, Home with assist PRN(pt/friend want to discharge to SNF until WB status increased, report concerns over pt caring for son with Down's Syndrome)  OT Equipment Recommendations  Other: will likely need shower chair, possible RTS as needed    Assessment   Assessment: Pt cooperative, agreeable to shower this date. Pt able to complete bed mobility, transfers EOB<>w/c<>toilet<>w/c<>shower chair mod I. Pt able complete bathing mod I seated on shower chair, UB dressing, grooming mod I. Pt able to thread LEs into pants and pull up w/o assist, able to larissa L sock w/o assist, requires assist to larissa R sock, declines use of sock aide. Pt requires increased time to complete all ADLs, but able to complete at w/c level with assist only for one sock d/t refusal to use AE for LB ADLs. Pt verb concern over caring for his son with Down's Syndrome at home, states \"If he knows I'm home, he will have to be there\". Cont POC. Patient Education: OT role, ADLs, transfers, WB status, AE for LB ADls (reluctant to use)  Activity Tolerance  Activity Tolerance: Patient Tolerated treatment well  Safety Devices  Safety Devices in place: Yes  Type of devices: Bed alarm in place; Patient at risk for falls; Left in bed;Call light within reach         Patient Diagnosis(es): There were no encounter diagnoses. has a past medical history of Allergic rhinitis, Anxiety, COPD (chronic obstructive pulmonary disease) ([address] Emphysema of lung ([address] GERD (gastroesophageal reflux disease), Hypercholesteremia, Hypertension, Lumbar radiculopathy, and PTSD (post-traumatic stress disorder). has no past surgical history on file.     Restrictions  Lower Extremity Weight Bearing Restrictions  Right Lower Extremity Weight Bearing: Flat Foot Weight Bearing(toe touch)  Left Lower Extremity Weight Bearing: Weight Bearing As Tolerated  Position Activity Restriction  Other position/activity restrictions:  AVOID sheer to buttocks, trialling ROHO due to nonintact buttock skin. Flat foot Toe touch weightbearing on RLE, weightbearing as tolerated on LLE. Notify physician for pulse less than 50 or greater than 120, respiratory rate less than 12 or greater than 25,systolic BP less than 90 or greater than 683, diastolic BP less than 50 or greater than 100. Subjective   General  Chart Reviewed: Yes  Patient assessed for rehabilitation services?: Yes  Additional Pertinent Hx: anxiety, COPD, emphysema, GERD, HTN, PTSD, lumbar radiculopathy, pelvic fx, sacral fx, fracture of R hip, pubic rami fx, laryngeal CA s/p sx, RCC s/p partial nephrectomy  Family / Caregiver Present: No  Referring Practitioner: Keysha Sanford MD  Diagnosis: fall, s/p multiple fractures  Subjective  Subjective: Pt supine, agreeable   General Comment  Comments: RN cleared for treatment  Pain Assessment  Pain Level: 8  Pain Type: Acute pain  Pain Location: Pelvis; Hip  Pain Orientation: Right  Pain Descriptors: Aching  Non-Pharmaceutical Pain Intervention(s): Shower; Emotional support  Vital Signs  Patient Currently in Pain: Yes   Orientation  Orientation  Overall Orientation Status: Within Functional Limits  Objective    ADL  Feeding: Independent  Grooming: Modified independent (seated in w/c at sink)  UE Bathing: Modified independent   LE Bathing: Modified independent   UE Dressing: Modified independent   LE Dressing: Minimal assistance(to larissa R sock only because pt refused to utilize sock aide)  Toileting: Modified independent   Additional Comments: pt cont to decline to use AE for LB ADLs        Balance  Sitting Balance: Independent  Standing Balance: Modified independent   Standing Balance  Time: <1 min x 6  Activity: transfers  Sit to stand: Modified independent  Stand to sit: Modified independent  Comment: grab bars  Functional Mobility  Functional - Mobility Device: Wheelchair  Activity: To/from bathroom; Other  Assist Level: Modified independent   Toilet Transfers  Toilet - Technique: Stand pivot  Equipment Used: Standard toilet(grab bars)  Toilet Transfer: Modified independent  Shower Transfers  Shower - Transfer Type: To and From  Shower - Transfer To:  Transfer tub bench  Shower - Technique: Stand pivot  Shower Transfers: Modified independence  Shower Transfers Comments: pt will need shower chair d/t WB status     Transfers  Stand Pivot Transfers: Modified independent  Sit to stand: Modified independent  Stand to sit: Modified independent                       Cognition  Memory: Decreased short term memory        Plan   Plan  Times per week: 5/7 days/wk  Current Treatment Recommendations: Strengthening, Balance Training, Functional Mobility Training, Endurance Training, Patient/Caregiver Education & Training, Equipment Evaluation, Education, & procurement, Self-Care / ADL    Goals  Short term goals  Time Frame for Short term goals: to be met by 4/18/19  Short term goal 1: Pt will complete LB ADLs with min A with AE as needed-met 4/25  Short term goal 2: Pt will complete functional transfers with Min A-met 4/15  Short term goal 3: Pt will complete toileting with min A or less-met 4/17  Short term goal 4: pt will adhere to WB status with all transfers and mobility-met 4/18  Long term goals  Time Frame for Long term goals : to be met 4/25/19  Long term goal 1: Pt will complete ADLs Mod I with AE as needed  Long term goal 2: Pt will complete all fucntional transfers Mod I-met 4/23  Long term goal 3: Pt will complete toileting Mod I-met 4/23  Long term goal 4: Pt will tolerate standing at sink for grooming tasks >5 min w/o fatigue or LOB   Long term goal 5: Pt will complete light home mgmt/meal prep tasks Mod I  Patient Goals   Patient goals : \"go home, take care of myself\"       Therapy Time Individual Concurrent Group Co-treatment   Time In 1230         Time Out 1400         Minutes 90         Timed Code Treatment Minutes: Bobby Reyna OT

## 2019-04-26 PROCEDURE — 6370000000 HC RX 637 (ALT 250 FOR IP): Performed by: PHYSICAL MEDICINE & REHABILITATION

## 2019-04-26 PROCEDURE — 97535 SELF CARE MNGMENT TRAINING: CPT

## 2019-04-26 PROCEDURE — 6360000002 HC RX W HCPCS: Performed by: PHYSICAL MEDICINE & REHABILITATION

## 2019-04-26 PROCEDURE — 97530 THERAPEUTIC ACTIVITIES: CPT

## 2019-04-26 PROCEDURE — 97116 GAIT TRAINING THERAPY: CPT

## 2019-04-26 PROCEDURE — 97110 THERAPEUTIC EXERCISES: CPT

## 2019-04-26 PROCEDURE — 94640 AIRWAY INHALATION TREATMENT: CPT

## 2019-04-26 PROCEDURE — 1280000000 HC REHAB R&B

## 2019-04-26 RX ADMIN — TIOTROPIUM BROMIDE 18 MCG: 18 CAPSULE ORAL; RESPIRATORY (INHALATION) at 07:31

## 2019-04-26 RX ADMIN — PRAVASTATIN SODIUM 40 MG: 40 TABLET ORAL at 20:24

## 2019-04-26 RX ADMIN — GUAIFENESIN 600 MG: 600 TABLET, EXTENDED RELEASE ORAL at 08:42

## 2019-04-26 RX ADMIN — GABAPENTIN 1200 MG: 300 CAPSULE ORAL at 14:34

## 2019-04-26 RX ADMIN — SENNOSIDES AND DOCUSATE SODIUM 1 TABLET: 8.6; 5 TABLET ORAL at 08:42

## 2019-04-26 RX ADMIN — BACLOFEN 10 MG: 10 TABLET ORAL at 08:43

## 2019-04-26 RX ADMIN — ENOXAPARIN SODIUM 40 MG: 40 INJECTION SUBCUTANEOUS at 09:53

## 2019-04-26 RX ADMIN — LORAZEPAM 1 MG: 1 TABLET ORAL at 20:24

## 2019-04-26 RX ADMIN — AMLODIPINE BESYLATE 5 MG: 5 TABLET ORAL at 08:43

## 2019-04-26 RX ADMIN — Medication 2 PUFF: at 19:49

## 2019-04-26 RX ADMIN — ASPIRIN 81 MG 81 MG: 81 TABLET ORAL at 08:43

## 2019-04-26 RX ADMIN — GABAPENTIN 1200 MG: 300 CAPSULE ORAL at 08:42

## 2019-04-26 RX ADMIN — MAGNESIUM GLUCONATE 500 MG ORAL TABLET 400 MG: 500 TABLET ORAL at 08:42

## 2019-04-26 RX ADMIN — PANTOPRAZOLE SODIUM 40 MG: 40 TABLET, DELAYED RELEASE ORAL at 06:12

## 2019-04-26 RX ADMIN — SENNOSIDES AND DOCUSATE SODIUM 1 TABLET: 8.6; 5 TABLET ORAL at 20:24

## 2019-04-26 RX ADMIN — TAMSULOSIN HYDROCHLORIDE 0.4 MG: 0.4 CAPSULE ORAL at 08:43

## 2019-04-26 RX ADMIN — GUAIFENESIN 600 MG: 600 TABLET, EXTENDED RELEASE ORAL at 17:26

## 2019-04-26 RX ADMIN — Medication 2 PUFF: at 07:31

## 2019-04-26 RX ADMIN — VITAMIN D, TAB 1000IU (100/BT) 1000 UNITS: 25 TAB at 08:42

## 2019-04-26 RX ADMIN — OXYCODONE HYDROCHLORIDE 10 MG: 5 TABLET ORAL at 18:50

## 2019-04-26 RX ADMIN — OXYCODONE HYDROCHLORIDE 10 MG: 5 TABLET ORAL at 07:28

## 2019-04-26 RX ADMIN — BACLOFEN 10 MG: 10 TABLET ORAL at 20:24

## 2019-04-26 RX ADMIN — DIPHENHYDRAMINE HCL 25 MG: 25 TABLET ORAL at 20:25

## 2019-04-26 RX ADMIN — GABAPENTIN 1200 MG: 300 CAPSULE ORAL at 20:24

## 2019-04-26 RX ADMIN — BACLOFEN 10 MG: 10 TABLET ORAL at 14:34

## 2019-04-26 ASSESSMENT — PAIN DESCRIPTION - ORIENTATION: ORIENTATION: RIGHT

## 2019-04-26 ASSESSMENT — PAIN DESCRIPTION - DESCRIPTORS: DESCRIPTORS: ACHING

## 2019-04-26 ASSESSMENT — PAIN SCALES - GENERAL
PAINLEVEL_OUTOF10: 8
PAINLEVEL_OUTOF10: 7
PAINLEVEL_OUTOF10: 8
PAINLEVEL_OUTOF10: 7
PAINLEVEL_OUTOF10: 7
PAINLEVEL_OUTOF10: 5

## 2019-04-26 ASSESSMENT — PAIN DESCRIPTION - LOCATION: LOCATION: HIP;PELVIS

## 2019-04-26 ASSESSMENT — PAIN DESCRIPTION - PAIN TYPE: TYPE: ACUTE PAIN

## 2019-04-26 NOTE — PROGRESS NOTES
Occupational Therapy  Facility/Department: [facility]  Daily Treatment Note  NAME: Chai Oh  : 1945  MRN: [id number]    Date of Service: 2019    Discharge Recommendations:  Home with Home health OT, Home with assist PRN(pt/friend want to d/c to SNF d/t concerns over pt being able to care for son with Down's syndrome)  OT Equipment Recommendations  Other: will need shower chair, possible RTS as needed    Assessment   Performance deficits / Impairments: Decreased functional mobility ; Decreased ADL status; Decreased strength;Decreased safe awareness;Decreased endurance;Decreased balance  Assessment: Pt pleasant, cooperative. Bed mobility and transfers mod I this date with bed rails/grab bars. Pt completes washing face/hands, combing hair, oral care and shaving seated in w/c at sink Mod I. Pt mod I with w/c mgmt throughout unit with good safety. Pt requesting to return to bed at EOS. Cont POC. Patient Education: OT role, ADLs, transfers, WB status, AE for LB ADls (reluctant to use)  REQUIRES OT FOLLOW UP: Yes  Activity Tolerance  Activity Tolerance: Patient Tolerated treatment well  Activity Tolerance: increased pain with coughing  Safety Devices  Safety Devices in place: Yes  Type of devices: Bed alarm in place; Patient at risk for falls; Left in bed;Call light within reach         Patient Diagnosis(es): There were no encounter diagnoses. has a past medical history of Allergic rhinitis, Anxiety, COPD (chronic obstructive pulmonary disease) ([facility] [address] Emphysema of lung ([address] GERD (gastroesophageal reflux disease), Hypercholesteremia, Hypertension, Lumbar radiculopathy, and PTSD (post-traumatic stress disorder). has no past surgical history on file.     Restrictions  Lower Extremity Weight Bearing Restrictions  Right Lower Extremity Weight Bearing: Flat Foot Weight Bearing  Left Lower Extremity Weight Bearing: Weight Bearing As Tolerated  Position Activity Restriction  Other position/activity restrictions: AVOID sheer to buttocks, trialling ROHO due to nonintact buttock skin. Flat foot Toe touch weightbearing on RLE, weightbearing as tolerated on LLE. Notify physician for pulse less than 50 or greater than 120, respiratory rate less than 12 or greater than 25,systolic BP less than 90 or greater than 058, diastolic BP less than 50 or greater than 100. Subjective   General  Chart Reviewed: Yes  Patient assessed for rehabilitation services?: Yes  Additional Pertinent Hx: anxiety, COPD, emphysema, GERD, HTN, PTSD, lumbar radiculopathy, pelvic fx, sacral fx, fracture of R hip, pubic rami fx, laryngeal CA s/p sx, RCC s/p partial nephrectomy  Family / Caregiver Present: No  Referring Practitioner: Vivi Noe MD  Diagnosis: fall, s/p multiple fractures  Subjective  Subjective: Pt supine, agreeable   General Comment  Comments: RN cleared for treatment  Pain Assessment  Pain Level: 7  Pain Type: Acute pain  Pain Location: Hip;Pelvis  Pain Orientation: Right  Pain Descriptors: Aching  Non-Pharmaceutical Pain Intervention(s): Distraction; Emotional support   Orientation  Orientation  Overall Orientation Status: Within Functional Limits  Objective    ADL  Grooming: Modified independent (seated in w/c at sink)  Additional Comments: pt completes grooming mod I seated at sink for washing face/hands, oral care, shaving , combing hair        Balance  Sitting Balance: Independent  Standing Balance: Modified independent   Standing Balance  Time: <1 min x 4  Sit to stand: Modified independent  Stand to sit: Modified independent  Comment: grab bars/bed rails     Transfers  Sit to stand: Modified independent  Stand to sit: Modified independent  Transfer Comments: pt able to adhere to WB status with SPTs and sit to stands                       Cognition  Memory: Decreased short term memory  Cognition Comment: pt very talkative, needs redirection throughout session                    Exercises  Other: w/c mgmt on unit mod I at steady pace and good safety                    Plan   Plan  Times per week: 5/7 days/wk  Current Treatment Recommendations: Strengthening, Balance Training, Functional Mobility Training, Endurance Training, Patient/Caregiver Education & Training, Equipment Evaluation, Education, & procurement, Self-Care / ADL    Goals  Short term goals  Time Frame for Short term goals: to be met by 4/18/19  Short term goal 1: Pt will complete LB ADLs with min A with AE as needed-met 4/25  Short term goal 2: Pt will complete functional transfers with Min A-met 4/15  Short term goal 3: Pt will complete toileting with min A or less-met 4/17  Short term goal 4: pt will adhere to WB status with all transfers and mobility-met 4/18  Long term goals  Time Frame for Long term goals : to be met 4/25/19  Long term goal 1: Pt will complete ADLs Mod I with AE as needed  Long term goal 2: Pt will complete all fucntional transfers Mod I-met 4/23  Long term goal 3: Pt will complete toileting Mod I-met 4/23  Long term goal 4: Pt will tolerate standing at sink for grooming tasks >5 min w/o fatigue or LOB   Long term goal 5: Pt will complete light home mgmt/meal prep tasks Mod I  Patient Goals   Patient goals : \"go home, take care of myself\"       Therapy Time   Individual Concurrent Group Co-treatment   Time In 1000         Time Out 1100         Minutes 60         Timed Code Treatment Minutes: 646 Eric Pedro, OT

## 2019-04-26 NOTE — PROGRESS NOTES
Occupational Therapy  Facility/Department: [facility]  Daily Treatment Note  NAME: Katja Caal  : 1945  MRN: [id number]    Date of Service: 2019    Discharge Recommendations:  Home with Home health OT, Home with assist PRN  OT Equipment Recommendations  Other: will need shower chair, possible RTS as needed    Assessment   Performance deficits / Impairments: Decreased functional mobility ; Decreased ADL status; Decreased strength;Decreased safe awareness;Decreased endurance;Decreased balance  Assessment: Patient required additional time and encouragement to participate. patient verbalized anxiety and \"feeling overwhelmed\" with transition home despite being at mod I level. Patient completed LB dressing this date with mod I   Patient Education: OT role, ADLs, transfers, WB status,   REQUIRES OT FOLLOW UP: Yes  Activity Tolerance  Activity Tolerance: Patient Tolerated treatment well  Activity Tolerance: increased pain with coughing  Safety Devices  Safety Devices in place: Yes  Type of devices: Bed alarm in place; Patient at risk for falls; Left in bed;Call light within reach         Patient Diagnosis(es): There were no encounter diagnoses. has a past medical history of Allergic rhinitis, Anxiety, COPD (chronic obstructive pulmonary disease) ([address] Emphysema of lung ([address] GERD (gastroesophageal reflux disease), Hypercholesteremia, Hypertension, Lumbar radiculopathy, and PTSD (post-traumatic stress disorder). has no past surgical history on file. Restrictions  Lower Extremity Weight Bearing Restrictions  Right Lower Extremity Weight Bearing: Flat Foot Weight Bearing  Left Lower Extremity Weight Bearing: Weight Bearing As Tolerated  Position Activity Restriction  Other position/activity restrictions:  AVOID sheer to buttocks, trialling ROHO due to nonintact buttock skin. Flat foot Toe touch weightbearing on RLE, weightbearing as tolerated on LLE.   Notify physician for pulse less than 50 or greater than 120, respiratory rate less than 12 or greater than 25,systolic BP less than 90 or greater than 139, diastolic BP less than 50 or greater than 100. Subjective   General  Chart Reviewed: Yes  Patient assessed for rehabilitation services?: Yes  Additional Pertinent Hx: anxiety, COPD, emphysema, GERD, HTN, PTSD, lumbar radiculopathy, pelvic fx, sacral fx, fracture of R hip, pubic rami fx, laryngeal CA s/p sx, RCC s/p partial nephrectomy  Family / Caregiver Present: Yes(Friends)  Referring Practitioner: Mago Bolaños MD  Diagnosis: fall, s/p multiple fractures  Subjective  Subjective: Pt supine, agreeable, but required motivation   General Comment  Comments: RN cleared for treatment  Vital Signs  Patient Currently in Pain: No   Orientation     Objective    ADL  LE Dressing: Modified independent (able to don and doff bilateral socks this date)        Balance  Sitting Balance: Independent  Standing Balance: Modified independent   Standing Balance  Time: <1 min x 4  Activity: transfers  Sit to stand: Modified independent  Stand to sit: Modified independent  Comment: grab bars/bed rails  Functional Mobility  Functional - Mobility Device: Wheelchair  Activity: To/from bathroom; Other  Assist Level: Modified independent          Cognition  Overall Cognitive Status: Exceptions  Memory: Decreased short term memory  Cognition Comment: pt very talkative, needs redirection throughout session         Plan   Plan  Times per week: 5/7 days/wk  Current Treatment Recommendations: Strengthening, Balance Training, Functional Mobility Training, Endurance Training, Patient/Caregiver Education & Training, Equipment Evaluation, Education, & procurement, Self-Care / ADL    Goals  Short term goals  Time Frame for Short term goals: to be met by 4/18/19  Short term goal 1: Pt will complete LB ADLs with min A with AE as needed-met 4/25  Short term goal 2: Pt will complete functional transfers with Min A-met 4/15  Short term goal 3: Pt will complete toileting with min A or less-met 4/17  Short term goal 4: pt will adhere to WB status with all transfers and mobility-met 4/18  Long term goals  Time Frame for Long term goals : to be met 4/25/19  Long term goal 1: Pt will complete ADLs Mod I with AE as needed GOAL MET 4/26/19  Long term goal 2: Pt will complete all fucntional transfers Mod I-met 4/23  Long term goal 3: Pt will complete toileting Mod I-met 4/23  Long term goal 4: Pt will tolerate standing at sink for grooming tasks >5 min w/o fatigue or LOB   Long term goal 5: Pt will complete light home mgmt/meal prep tasks Mod I  Patient Goals   Patient goals : \"go home, take care of myself\"       Therapy Time   Individual Concurrent Group Co-treatment   Time In 1410         Time Out 1440         Minutes 30         Timed Code Treatment Minutes: [address] MARBELLA IrizarryR/L

## 2019-04-26 NOTE — CARE COORDINATION
Spoke with Dr. Jaqueline Rizzo who is okay with patient doing an home evaluation on Monday and he will place orders. Spoke with Bernadettenathalia Murphy, patient's friend via phone call. She stated she will be here Monday at [facility] did request referral to [facility] to see if he would qualify under Medicare for another SNF. Explained if patient is okay with this, Luis Moeller will make referral but he will likely not qualify. She stated understanding and \"wants to make sure every effort has been exhausted\". Nichole with therapy notified. Spoke with patient who is agreeable to [facility]. Received call from [facility] with [employer]. She is aware he will need RN/PT/OT and a facesheet sent epic. At 11:50am: Placed call to [facility] AT [facility] at [facility] but there was no answer and voicemail was full. Will attempt again when able.      Lisa Munoz MSW, LSW

## 2019-04-26 NOTE — CARE COORDINATION
Spoke with Willi Tong at [facility] stating they can accept patient under his Medicare with the current recommendations from therapy and he will continue to get therapy as he is Non-weight bearing. She stated that he will not be there long. Explained that patient's DME is coming to the ARU from the [place] at the earliest Monday and he not be discharging until then. Spoke with patient at bedside with friends present. Patient stated they could stay present. He is aware of the above and stated \"I have to pay so much money to go there, I just want to go home. \" He stated that he just got off the phone with [facility] who told him this but writer could provide her with an update. Placed call to [facility]. She stated that he doesn't want to pay the co-pay. Explained to [facility] that patient told writer that he wants to return home. [facility] stated she would speak with patient this weekend and will still plan on doing the home eval on Monday with therapy. Explained that patient could get into a SNF from home if needed. Juana Lazaro and Dr. Jessy Mohr made aware.      Lou Begum MSW, LSW

## 2019-04-26 NOTE — PROGRESS NOTES
Department of [facility]  Dr. Flor Barber Progress Note    Patient Identification:  Lio Chappell  [id number]  : 1945  Admit date: 4/10/2019      Diagnosis:   Patient Active Problem List   Diagnosis    Debility           Subjective: Pt seen this afternoon. Patient  has made good progress in his rehabilitation therapies over the past week, and is now ready for discharge to home next week. His pain is under control with his current medication. Repeat labs yesterday look good and are stable. Patient will be Modified Independent with his wheelchair and will be non-weight bearing for about 12 weeks total. He is concerned about going home safely with his stairs at home, and fitting his wheelchair around his house safely. Will plan to do a home visit with his therapists on Monday to see how he does at home. His safety equipment is to be delivered this weekend to his house by the [place].     BP (!) 143/66   Pulse 83   Temp 97.6 °F (36.4 °C) (Oral)   Resp 16   Ht 6' (1.829 m)   Wt 167 lb 15.9 oz (76.2 kg)   SpO2 92%   BMI 22.78 kg/m²     Last 24 hour lab  No results found for this or any previous visit (from the past 24 hour(s)). Therapy progress:  PT  Position Activity Restriction  Other position/activity restrictions:  AVOID sheer to buttocks, trialling ROHO due to nonintact buttock skin. Flat foot Toe touch weightbearing on RLE, weightbearing as tolerated on LLE. Notify physician for pulse less than 50 or greater than 120, respiratory rate less than 12 or greater than 25,systolic BP less than 90 or greater than 270, diastolic BP less than 50 or greater than 100.   Objective     Sit to Stand: Modified independent  Stand to sit: Modified independent  Bed to Chair: Modified independent  Stand Pivot Transfers: Modified independent(with use of bedrail to w/c)  Device: Rolling Walker  Assistance: Stand by assistance  Distance: 28' and 26'  OT  PT Equipment Recommendations  Equipment Needed: Yes  Mobility Devices: Wheelchair  Walker: Rolling  Wheelchair: Standard  Other: will continue to assess  Toilet - Technique: Stand pivot  Equipment Used: Standard toilet(grab bars)                    Assessment and Plan:     Right acetabulum, inferior/superior pubic rami, and sacral fractures - Managed non-operatively. Pain control. TTWB RLE and WBAT LLE. PT/OT.     Intramuscular hematoma (piriformis, obturator) - Monitor Hgb.       COPD exacerbation - Completed steroids. Dulera, spiriva, mucinex, prn duonebs.       HTN - amlodipine     HLD - pravastatin     GERD - pantoprazole     PTSD - Restart lorazepam at lower dose as he has been off of this for >1 week and is now also on oxycodone. Pending response consider increasing to home dose of 2mg qhs.      Bladder - high risk retention - Monitor PVRs, SC prn >300cc. Flomax.      Bowel - high risk constipation - colace=senna BID, PRN MoM. follow bowel movements.  Enema or suppository if needed.      Pain control - Gabapentin (for chronic neuropathy), baclofen, prn oxycodone     DVT PPx - lovenox       Alena Bernal MD, 4/26/2019, 12:22 PM

## 2019-04-26 NOTE — CARE COORDINATION
Spoke with Chidi Laguerre at [facility]. Faxed facesheet via epic.      Renay Lynne MSW, LSW

## 2019-04-26 NOTE — PROGRESS NOTES
Physical Therapy  Facility/Department: [facility]  Daily Treatment Note  NAME: Abdiaziz Roach  : 1945  MRN: [id number]    Date of Service: 2019    Discharge Recommendations:  Continue to assess pending progress      Patient Diagnosis(es): There were no encounter diagnoses. has a past medical history of Allergic rhinitis, Anxiety, COPD (chronic obstructive pulmonary disease) ([facility] [address] Emphysema of lung ([facility] [address] GERD (gastroesophageal reflux disease), Hypercholesteremia, Hypertension, Lumbar radiculopathy, and PTSD (post-traumatic stress disorder). has no past surgical history on file. Restrictions  Lower Extremity Weight Bearing Restrictions  Right Lower Extremity Weight Bearing: Flat Foot Weight Bearing  Left Lower Extremity Weight Bearing: Weight Bearing As Tolerated  Position Activity Restriction  Other position/activity restrictions:  AVOID sheer to buttocks, trialling ROHO due to nonintact buttock skin. Flat foot Toe touch weightbearing on RLE, weightbearing as tolerated on LLE. Notify physician for pulse less than 50 or greater than 120, respiratory rate less than 12 or greater than 25,systolic BP less than 90 or greater than 791, diastolic BP less than 50 or greater than 100. Subjective   General  Chart Reviewed: Yes  Additional Pertinent Hx: s/p fall resulting in pelvic and sacral fractures (non-operative treatment)  Family / Caregiver Present: No  Referring Practitioner: Nikki Pardo MD  Subjective  Subjective: Pt reports mild discomfort in pelvis and groin but reports it is manageable. Pt also has intermittent c/o L rib pain.    General Comment  Comments: Pt supine in bed upon arrival.          Objective   Bed mobility  Supine to Sit: Independent  Sit to Supine: Independent  Scooting: Independent  Transfers  Sit to Stand: Modified independent  Stand to sit: Modified independent  Bed to Chair: Modified independent  Stand Pivot Transfers: Modified independent(with use of bedrail to w/c)  Ambulation  Ambulation?: Yes  WB Status: Foot flat touch down WB   Ambulation 1  Surface: level tile  Device: Rolling Walker  Assistance: Stand by assistance  Quality of Gait: step to gait pattern, no cues needed for safety with placement of walker  Distance: 28' and 26'  Wheelchair Activities  Left Leg Rest Level of Assistance: Independent  Right Leg Rest Level of Assistance: Independent  Left Brakes Level of Assistance: Independent  Right Brakes Level of Assistance: Independent  Propulsion: Yes  Propulsion 1  Propulsion: Manual  Level: Level Tile  Method: RUE;LUE  Level of Assistance: Modified independent  Description/ Details: able to negotiate through doorways and over carpet without difficulty  Distance: 300'        Exercises  Heelslides: 2x15   Hip Abduction: x15 LLE, unable to complete RLE with assist secondary to pain  Knee Long Arc Quad: x30 BLE      Assessment   Body structures, Functions, Activity limitations: Decreased functional mobility ; Decreased strength;Decreased endurance;Decreased safe awareness;Decreased ROM; Decreased balance; Increased Pain  Assessment: Pt continues to be mod I for transfers and wheelchair mobility. Pt ambulating greater distance this session with SBA. Pt continues to demonstrate fatigue with increased activity requiring rest breaks. Treatment Diagnosis: Debility  Prognosis: Fair;Good  Patient Education: safety with mobility and use of AD with transfers  REQUIRES PT FOLLOW UP: Yes  Activity Tolerance  Activity Tolerance: Patient Tolerated treatment well;Patient limited by endurance     Goals  Short term goals  Time Frame for Short term goals: To be met by 4/18/19  Short term goal 1: Pt to perform supine to/from sit transfers at flat bed without rails with supervision. GOAL met 4/15/2019 Pt demo supervision/SBA for sup<>sit transfers. Short term goal 2: Pt to perform sit to/from stand at [facility] with CGA.   GOAL met 4/17/2019 CGA with cues for sit<>stand with FWW from mat/chair/WC. Short term goal 3: Pt to ambulate 50 ft with LRAD with CGA for household mobility. GOAL not 4/23/2019 met PT SYL for transfer distance walking but requires supervision/SBA for gait greater distances walks 20 feet wtih FWW RLE footflat TDWB LLE WBAT with further distances limited by Fx (pelvis and acetabulum)  associated pain, WB limitation and limited endurance. Short term goal 4: Pt to propel wheelchair 50 ft on level surface with mod I for household mobility. GOAL MET 4/12/2019 Patient demo 205 feet SYL WC propulsion level and carpet with left/.right tuns and thru doorways  Short term goal 5: Pt to demonstrate car transfer with RW with mod A x 1. GOAL met 4/19/2019 on return demo pt superivsion with car transfer with car transfer simulator  with FWW TDWB foot flat RLE  to/from WC. Long term goals  Time Frame for Long term goals : To be met by 4/25/19  Long term goal 1: Pt to perform supine to/from sit transfers at flat bed without rails with mod I. GOAL MET 4/19/2019 Pt indep sup<>sit from [facility] flat without use of rails   Long term goal 2: Pt to perform sit to/from stand at LRAD with mod I. GOAL met 4/23/2019 pt demo  SYL with sit<.stand with FWW and WBAT LLE and TDWB RLE. Long term goal 3: Pt to propel WC  150 ft with LRAD with mod I for community mobility. 4/22/2019  GOAL met pt demo  feet propulsion with WC including 10 foot ramp left/right turn and carpet. Long term goal 4: Pt to manage 1 curb step with wheelchair or LRAD with mod I for home entry. DC this goal4/23/2019 and recommend ramped entrance as suggested to patient  as pt not appriate for steps from safety perspective due to pelvic Fx acetabular Fx with WBAT LLE and TDWB RLE with foot flat.    Long term goal 5: /  Patient Goals   Patient goals : \"to be home and able to drive and do what I was doing\"    Plan    Plan  Times per week: 5/7 days week   Times per day: Daily  Current Treatment Recommendations: Strengthening, Functional Mobility Training, Wheelchair Mobility Training, Neuromuscular Re-education, Home Exercise Program, Equipment Evaluation, Education, & procurement, Safety Education & Training, Gait Training, Transfer Training, ROM, Balance Training, Endurance Training, Stair training, Pain Management, Patient/Caregiver Education & Training, Positioning  Safety Devices  Type of devices: (left up in w/c with PT present for next session)  Restraints  Initially in place: No     Therapy Time   Individual Concurrent Group Co-treatment   Time In 0800         Time Out 0900         Minutes 60         Timed Code Treatment Minutes: [address],   [facility]

## 2019-04-26 NOTE — PROGRESS NOTES
Physical Therapy  Facility/Department: [facility]  Daily Treatment Note  NAME: Tony Jacobs  : 1945  MRN: [id number]    Date of Service: 2019    Discharge Recommendations:  Continue to assess pending progress        Patient Diagnosis(es): There were no encounter diagnoses. has a past medical history of Allergic rhinitis, Anxiety, COPD (chronic obstructive pulmonary disease) ([facility] [address] Emphysema of lung ([facility] [address] GERD (gastroesophageal reflux disease), Hypercholesteremia, Hypertension, Lumbar radiculopathy, and PTSD (post-traumatic stress disorder). has no past surgical history on file. Restrictions  Lower Extremity Weight Bearing Restrictions  Right Lower Extremity Weight Bearing: Flat Foot Weight Bearing  Left Lower Extremity Weight Bearing: Weight Bearing As Tolerated  Position Activity Restriction  Other position/activity restrictions:  AVOID sheer to buttocks, trialling ROHO due to nonintact buttock skin. Flat foot Toe touch weightbearing on RLE, weightbearing as tolerated on LLE. Notify physician for pulse less than 50 or greater than 120, respiratory rate less than 12 or greater than 25,systolic BP less than 90 or greater than 633, diastolic BP less than 50 or greater than 100. Subjective  PT reports no pain in ribs at rest but with mobility c/o LEft rib pain 7.5/10, Pt c/o pelvic and back of legs pain as 6-7/10 throughout session. Orientation: person, place, time. Cognition : pt follows 2 step commands and today does not need cues for step to gait with RLE TDWB foot flat and WBAT LLE      Objective      Transfers: RLE TDWB foot flat and WBAT LLE   WC>bed spt WB LLE with bed rail SYL  Sit to Stand: Modified independent FWW from [employer] x2  Stand to sit: Modified independent FWW to [employer] x2  Car Transfer: WC<>car FWW SYL from  Passenger side. WC propulsion:   feet with left/right turns   Sit>sup indep, scooting in bed indep.   Gait with FWW step to gait with RLE TDWB foot flat and WBAT LLE at slow rate with pt employing appropriate technique and precautions 35 feetx2 SBA as pt with variable fatigue and sob following gait after first bout of walking SpO2 92% after second bout 88% sPO2 on RA with patient requiring brief 1 minute seated rest for sPO2 to rebound to 90% or greater. Assessment Pt seen for 30 minute session for endurance activity with gait, transfers and  WC propulsion. Pt continues to be SYL for transfers and WC propulsion with FWW from WC/bed/chair/car transfer simulator. Pt has advanced to 35 feet of walking with FWW step to gait with RLE TDWB foot flat and WBAT LLE . Pt with scheduled DC to home this date with pt now requesting home visit that patient previously this week refused. Patient  awaiting DME for DC from [facility] for home including ramp for 1 step into home. Goals  Short term goals  Time Frame for Short term goals: To be met by 4/18/19  Short term goal 1: Pt to perform supine to/from sit transfers at flat bed without rails with supervision. GOAL met 4/15/2019 Pt demo supervision/SBA for sup<>sit transfers. Short term goal 2: Pt to perform sit to/from stand at [facility] with CGA. GOAL met 4/17/2019 CGA with cues for sit<>stand with FWW from mat/chair/WC. Short term goal 3: Pt to ambulate 50 ft with LRAD with CGA for household mobility. GOAL not 4/23/2019 met PT SYL for transfer distance walking but requires supervision/SBA for gait greater distances walks 20 feet wtih FWW RLE footflat TDWB LLE WBAT with further distances limited by Fx (pelvis and acetabulum)  associated pain, WB limitation and limited endurance. Short term goal 4: Pt to propel wheelchair 50 ft on level surface with mod I for household mobility. GOAL MET 4/12/2019 Patient demo 205 feet SYL WC propulsion level and carpet with left/.right tuns and thru doorways  Short term goal 5: Pt to demonstrate car transfer with RW with mod A x 1.   GOAL met 4/19/2019 on return demo pt superivsion with car transfer with car transfer simulator  with FWW TDWB foot flat RLE  to/from WC. Long term goals  Time Frame for Long term goals : To be met by 4/25/19  Long term goal 1: Pt to perform supine to/from sit transfers at flat bed without rails with mod I. GOAL MET 4/19/2019 Pt indep sup<>sit from [facility] flat without use of rails   Long term goal 2: Pt to perform sit to/from stand at LRAD with mod I. GOAL met 4/23/2019 pt demo  SYL with sit<.stand with FWW and WBAT LLE and TDWB RLE. Long term goal 3: Pt to propel WC  150 ft with LRAD with mod I for community mobility. 4/22/2019  GOAL met pt demo  feet propulsion with WC including 10 foot ramp left/right turn and carpet. Long term goal 4: Pt to manage 1 curb step with wheelchair or LRAD with mod I for home entry. DC this goal4/23/2019 and recommend ramped entrance as suggested to patient  as pt not appriate for steps from safety perspective due to pelvic Fx acetabular Fx with WBAT LLE and TDWB RLE with foot flat.    Long term goal 5: /  Patient Goals   Patient goals : \"to be home and able to drive and do what I was doing\"    Plan    Plan  Times per week: 5/7 days week   Times per day: Daily  Current Treatment Recommendations: Strengthening, Functional Mobility Training, Wheelchair Mobility Training, Neuromuscular Re-education, Home Exercise Program, Equipment Evaluation, Education, & procurement, Safety Education & Training, Gait Training, Transfer Training, ROM, Balance Training, Endurance Training, Stair training, Pain Management, Patient/Caregiver Education & Training, Positioning  Safety Devices  Type of devices: (left up in w/c with PT present for next session)  Restraints  Initially in place: No     Therapy Time   Individual Concurrent Group Co-treatment   Time In 0900         Time Out 0930         Minutes 30         Timed Code Treatment Minutes: 30 Minutes       Justin Olguin PT

## 2019-04-26 NOTE — CARE COORDINATION
Received call [facility] at the [facility] who confirmed that their were consults placed for the Wheelchair, Neri Doctor, [employer], Cushion and Ramp. She confirmed that the Wheelchair/Rolling walker will be delivered to his hospital room. Inquired about using [employer] as this is who his family requested. She stated that if they have a specific company, then to use his Medicare for home care as the [place] only has a few companies they use. Will update patient and Romie Poole when he is done with therapy.      Tabitha Sanchez MSW, LSW

## 2019-04-27 PROCEDURE — 94640 AIRWAY INHALATION TREATMENT: CPT

## 2019-04-27 PROCEDURE — 6370000000 HC RX 637 (ALT 250 FOR IP): Performed by: PHYSICAL MEDICINE & REHABILITATION

## 2019-04-27 PROCEDURE — 6360000002 HC RX W HCPCS: Performed by: PHYSICAL MEDICINE & REHABILITATION

## 2019-04-27 PROCEDURE — 1280000000 HC REHAB R&B

## 2019-04-27 RX ADMIN — SENNOSIDES AND DOCUSATE SODIUM 1 TABLET: 8.6; 5 TABLET ORAL at 21:12

## 2019-04-27 RX ADMIN — BACLOFEN 10 MG: 10 TABLET ORAL at 08:08

## 2019-04-27 RX ADMIN — OXYCODONE HYDROCHLORIDE 10 MG: 5 TABLET ORAL at 12:23

## 2019-04-27 RX ADMIN — GABAPENTIN 1200 MG: 300 CAPSULE ORAL at 13:30

## 2019-04-27 RX ADMIN — MAGNESIUM GLUCONATE 500 MG ORAL TABLET 400 MG: 500 TABLET ORAL at 08:08

## 2019-04-27 RX ADMIN — PRAVASTATIN SODIUM 40 MG: 40 TABLET ORAL at 21:12

## 2019-04-27 RX ADMIN — OXYCODONE HYDROCHLORIDE 10 MG: 5 TABLET ORAL at 05:56

## 2019-04-27 RX ADMIN — TIOTROPIUM BROMIDE 18 MCG: 18 CAPSULE ORAL; RESPIRATORY (INHALATION) at 07:43

## 2019-04-27 RX ADMIN — SENNOSIDES AND DOCUSATE SODIUM 1 TABLET: 8.6; 5 TABLET ORAL at 08:08

## 2019-04-27 RX ADMIN — ENOXAPARIN SODIUM 40 MG: 40 INJECTION SUBCUTANEOUS at 08:08

## 2019-04-27 RX ADMIN — OXYCODONE HYDROCHLORIDE 10 MG: 5 TABLET ORAL at 21:25

## 2019-04-27 RX ADMIN — TAMSULOSIN HYDROCHLORIDE 0.4 MG: 0.4 CAPSULE ORAL at 08:08

## 2019-04-27 RX ADMIN — GABAPENTIN 1200 MG: 300 CAPSULE ORAL at 21:11

## 2019-04-27 RX ADMIN — PANTOPRAZOLE SODIUM 40 MG: 40 TABLET, DELAYED RELEASE ORAL at 05:56

## 2019-04-27 RX ADMIN — BACLOFEN 10 MG: 10 TABLET ORAL at 21:12

## 2019-04-27 RX ADMIN — GUAIFENESIN 600 MG: 600 TABLET, EXTENDED RELEASE ORAL at 16:28

## 2019-04-27 RX ADMIN — ASPIRIN 81 MG 81 MG: 81 TABLET ORAL at 08:08

## 2019-04-27 RX ADMIN — BACLOFEN 10 MG: 10 TABLET ORAL at 13:30

## 2019-04-27 RX ADMIN — GABAPENTIN 1200 MG: 300 CAPSULE ORAL at 08:08

## 2019-04-27 RX ADMIN — Medication 2 PUFF: at 07:43

## 2019-04-27 RX ADMIN — VITAMIN D, TAB 1000IU (100/BT) 1000 UNITS: 25 TAB at 08:08

## 2019-04-27 RX ADMIN — LORAZEPAM 1 MG: 1 TABLET ORAL at 21:12

## 2019-04-27 RX ADMIN — GUAIFENESIN 600 MG: 600 TABLET, EXTENDED RELEASE ORAL at 08:08

## 2019-04-27 RX ADMIN — DIPHENHYDRAMINE HCL 25 MG: 25 TABLET ORAL at 21:12

## 2019-04-27 ASSESSMENT — PAIN SCALES - GENERAL
PAINLEVEL_OUTOF10: 8
PAINLEVEL_OUTOF10: 0
PAINLEVEL_OUTOF10: 8
PAINLEVEL_OUTOF10: 0
PAINLEVEL_OUTOF10: 0

## 2019-04-28 PROCEDURE — 6370000000 HC RX 637 (ALT 250 FOR IP): Performed by: PHYSICAL MEDICINE & REHABILITATION

## 2019-04-28 PROCEDURE — 94640 AIRWAY INHALATION TREATMENT: CPT

## 2019-04-28 PROCEDURE — 1280000000 HC REHAB R&B

## 2019-04-28 PROCEDURE — 6360000002 HC RX W HCPCS: Performed by: PHYSICAL MEDICINE & REHABILITATION

## 2019-04-28 RX ADMIN — GABAPENTIN 1200 MG: 300 CAPSULE ORAL at 09:38

## 2019-04-28 RX ADMIN — GUAIFENESIN 600 MG: 600 TABLET, EXTENDED RELEASE ORAL at 09:38

## 2019-04-28 RX ADMIN — BACLOFEN 10 MG: 10 TABLET ORAL at 13:38

## 2019-04-28 RX ADMIN — GABAPENTIN 1200 MG: 300 CAPSULE ORAL at 21:22

## 2019-04-28 RX ADMIN — PANTOPRAZOLE SODIUM 40 MG: 40 TABLET, DELAYED RELEASE ORAL at 06:38

## 2019-04-28 RX ADMIN — GABAPENTIN 1200 MG: 300 CAPSULE ORAL at 13:38

## 2019-04-28 RX ADMIN — VITAMIN D, TAB 1000IU (100/BT) 1000 UNITS: 25 TAB at 09:37

## 2019-04-28 RX ADMIN — BACLOFEN 10 MG: 10 TABLET ORAL at 21:22

## 2019-04-28 RX ADMIN — OXYCODONE HYDROCHLORIDE 10 MG: 5 TABLET ORAL at 17:24

## 2019-04-28 RX ADMIN — LORAZEPAM 1 MG: 1 TABLET ORAL at 21:31

## 2019-04-28 RX ADMIN — TAMSULOSIN HYDROCHLORIDE 0.4 MG: 0.4 CAPSULE ORAL at 09:37

## 2019-04-28 RX ADMIN — GUAIFENESIN 600 MG: 600 TABLET, EXTENDED RELEASE ORAL at 17:15

## 2019-04-28 RX ADMIN — OXYCODONE HYDROCHLORIDE 10 MG: 5 TABLET ORAL at 21:22

## 2019-04-28 RX ADMIN — TIOTROPIUM BROMIDE 18 MCG: 18 CAPSULE ORAL; RESPIRATORY (INHALATION) at 07:38

## 2019-04-28 RX ADMIN — ENOXAPARIN SODIUM 40 MG: 40 INJECTION SUBCUTANEOUS at 09:38

## 2019-04-28 RX ADMIN — SENNOSIDES AND DOCUSATE SODIUM 1 TABLET: 8.6; 5 TABLET ORAL at 21:22

## 2019-04-28 RX ADMIN — Medication 2 PUFF: at 07:38

## 2019-04-28 RX ADMIN — BACLOFEN 10 MG: 10 TABLET ORAL at 09:37

## 2019-04-28 RX ADMIN — DIPHENHYDRAMINE HCL 25 MG: 25 TABLET ORAL at 21:31

## 2019-04-28 RX ADMIN — ASPIRIN 81 MG 81 MG: 81 TABLET ORAL at 09:37

## 2019-04-28 RX ADMIN — OXYCODONE HYDROCHLORIDE 10 MG: 5 TABLET ORAL at 09:48

## 2019-04-28 RX ADMIN — MAGNESIUM GLUCONATE 500 MG ORAL TABLET 400 MG: 500 TABLET ORAL at 09:37

## 2019-04-28 RX ADMIN — SENNOSIDES AND DOCUSATE SODIUM 1 TABLET: 8.6; 5 TABLET ORAL at 09:37

## 2019-04-28 RX ADMIN — Medication 2 PUFF: at 19:49

## 2019-04-28 RX ADMIN — PRAVASTATIN SODIUM 40 MG: 40 TABLET ORAL at 21:22

## 2019-04-28 ASSESSMENT — PAIN SCALES - GENERAL
PAINLEVEL_OUTOF10: 0
PAINLEVEL_OUTOF10: 8
PAINLEVEL_OUTOF10: 0
PAINLEVEL_OUTOF10: 4
PAINLEVEL_OUTOF10: 0
PAINLEVEL_OUTOF10: 8
PAINLEVEL_OUTOF10: 4
PAINLEVEL_OUTOF10: 8
PAINLEVEL_OUTOF10: 7

## 2019-04-28 NOTE — PLAN OF CARE
Problem: Pain:  Goal: Control of acute pain  Description  Control of acute pain   Outcome: Ongoing   Patient able to use pain rating scale 0/10 adequately without problems. Pain medications explained along with frequency. Verbalizes understanding. Call light within reach. Pain meds given before bed. Resting quietly in bed now. Problem: Falls - Risk of:  Goal: Absence of physical injury  Description  Absence of physical injury   Outcome: Ongoing   Fall precautions in place, bed alarm on, nonskid foot wear applied, bed in lowest position, and call light within reach. Will continue to monitor.

## 2019-04-29 LAB
ANION GAP SERPL CALCULATED.3IONS-SCNC: 10 MMOL/L (ref 3–16)
BASOPHILS ABSOLUTE: 0.1 K/UL (ref 0–0.2)
BASOPHILS RELATIVE PERCENT: 0.9 %
BUN BLDV-MCNC: 16 MG/DL (ref 7–20)
CALCIUM SERPL-MCNC: 8.9 MG/DL (ref 8.3–10.6)
CHLORIDE BLD-SCNC: 98 MMOL/L (ref 99–110)
CO2: 31 MMOL/L (ref 21–32)
CREAT SERPL-MCNC: 0.8 MG/DL (ref 0.8–1.3)
EOSINOPHILS ABSOLUTE: 0.4 K/UL (ref 0–0.6)
EOSINOPHILS RELATIVE PERCENT: 5.5 %
GFR AFRICAN AMERICAN: >60
GFR NON-AFRICAN AMERICAN: >60
GLUCOSE BLD-MCNC: 104 MG/DL (ref 70–99)
HCT VFR BLD CALC: 39 % (ref 40.5–52.5)
HEMOGLOBIN: 12.9 G/DL (ref 13.5–17.5)
LYMPHOCYTES ABSOLUTE: 1.4 K/UL (ref 1–5.1)
LYMPHOCYTES RELATIVE PERCENT: 21.1 %
MCH RBC QN AUTO: 31.2 PG (ref 26–34)
MCHC RBC AUTO-ENTMCNC: 33 G/DL (ref 31–36)
MCV RBC AUTO: 94.6 FL (ref 80–100)
MONOCYTES ABSOLUTE: 0.6 K/UL (ref 0–1.3)
MONOCYTES RELATIVE PERCENT: 9.3 %
NEUTROPHILS ABSOLUTE: 4.1 K/UL (ref 1.7–7.7)
NEUTROPHILS RELATIVE PERCENT: 63.2 %
PDW BLD-RTO: 14.6 % (ref 12.4–15.4)
PLATELET # BLD: 439 K/UL (ref 135–450)
PMV BLD AUTO: 7.3 FL (ref 5–10.5)
POTASSIUM REFLEX MAGNESIUM: 4.5 MMOL/L (ref 3.5–5.1)
RBC # BLD: 4.12 M/UL (ref 4.2–5.9)
SODIUM BLD-SCNC: 139 MMOL/L (ref 136–145)
WBC # BLD: 6.5 K/UL (ref 4–11)

## 2019-04-29 PROCEDURE — 97535 SELF CARE MNGMENT TRAINING: CPT

## 2019-04-29 PROCEDURE — 85025 COMPLETE CBC W/AUTO DIFF WBC: CPT

## 2019-04-29 PROCEDURE — 97530 THERAPEUTIC ACTIVITIES: CPT

## 2019-04-29 PROCEDURE — 94640 AIRWAY INHALATION TREATMENT: CPT

## 2019-04-29 PROCEDURE — 36415 COLL VENOUS BLD VENIPUNCTURE: CPT

## 2019-04-29 PROCEDURE — 1280000000 HC REHAB R&B

## 2019-04-29 PROCEDURE — 97116 GAIT TRAINING THERAPY: CPT

## 2019-04-29 PROCEDURE — 6370000000 HC RX 637 (ALT 250 FOR IP): Performed by: PHYSICAL MEDICINE & REHABILITATION

## 2019-04-29 PROCEDURE — 80048 BASIC METABOLIC PNL TOTAL CA: CPT

## 2019-04-29 PROCEDURE — 6360000002 HC RX W HCPCS: Performed by: PHYSICAL MEDICINE & REHABILITATION

## 2019-04-29 RX ADMIN — DIPHENHYDRAMINE HCL 25 MG: 25 TABLET ORAL at 20:58

## 2019-04-29 RX ADMIN — GABAPENTIN 1200 MG: 300 CAPSULE ORAL at 08:29

## 2019-04-29 RX ADMIN — Medication 2 PUFF: at 20:16

## 2019-04-29 RX ADMIN — GABAPENTIN 1200 MG: 300 CAPSULE ORAL at 13:57

## 2019-04-29 RX ADMIN — GABAPENTIN 1200 MG: 300 CAPSULE ORAL at 20:52

## 2019-04-29 RX ADMIN — SENNOSIDES AND DOCUSATE SODIUM 1 TABLET: 8.6; 5 TABLET ORAL at 08:30

## 2019-04-29 RX ADMIN — TAMSULOSIN HYDROCHLORIDE 0.4 MG: 0.4 CAPSULE ORAL at 08:30

## 2019-04-29 RX ADMIN — ENOXAPARIN SODIUM 40 MG: 40 INJECTION SUBCUTANEOUS at 08:31

## 2019-04-29 RX ADMIN — BACLOFEN 10 MG: 10 TABLET ORAL at 08:29

## 2019-04-29 RX ADMIN — VITAMIN D, TAB 1000IU (100/BT) 1000 UNITS: 25 TAB at 08:30

## 2019-04-29 RX ADMIN — BACLOFEN 10 MG: 10 TABLET ORAL at 20:52

## 2019-04-29 RX ADMIN — MAGNESIUM GLUCONATE 500 MG ORAL TABLET 400 MG: 500 TABLET ORAL at 08:30

## 2019-04-29 RX ADMIN — SENNOSIDES AND DOCUSATE SODIUM 1 TABLET: 8.6; 5 TABLET ORAL at 20:52

## 2019-04-29 RX ADMIN — GUAIFENESIN 600 MG: 600 TABLET, EXTENDED RELEASE ORAL at 15:55

## 2019-04-29 RX ADMIN — PANTOPRAZOLE SODIUM 40 MG: 40 TABLET, DELAYED RELEASE ORAL at 05:55

## 2019-04-29 RX ADMIN — Medication 2 PUFF: at 07:38

## 2019-04-29 RX ADMIN — OXYCODONE HYDROCHLORIDE 10 MG: 5 TABLET ORAL at 15:55

## 2019-04-29 RX ADMIN — TIOTROPIUM BROMIDE 18 MCG: 18 CAPSULE ORAL; RESPIRATORY (INHALATION) at 07:38

## 2019-04-29 RX ADMIN — OXYCODONE HYDROCHLORIDE 10 MG: 5 TABLET ORAL at 08:50

## 2019-04-29 RX ADMIN — PRAVASTATIN SODIUM 40 MG: 40 TABLET ORAL at 20:52

## 2019-04-29 RX ADMIN — GUAIFENESIN 600 MG: 600 TABLET, EXTENDED RELEASE ORAL at 08:30

## 2019-04-29 RX ADMIN — LORAZEPAM 1 MG: 1 TABLET ORAL at 20:58

## 2019-04-29 RX ADMIN — ASPIRIN 81 MG 81 MG: 81 TABLET ORAL at 08:30

## 2019-04-29 RX ADMIN — OXYCODONE HYDROCHLORIDE 10 MG: 5 TABLET ORAL at 20:53

## 2019-04-29 RX ADMIN — BACLOFEN 10 MG: 10 TABLET ORAL at 13:57

## 2019-04-29 RX ADMIN — AMLODIPINE BESYLATE 5 MG: 5 TABLET ORAL at 08:30

## 2019-04-29 ASSESSMENT — PAIN SCALES - GENERAL
PAINLEVEL_OUTOF10: 5
PAINLEVEL_OUTOF10: 0
PAINLEVEL_OUTOF10: 8
PAINLEVEL_OUTOF10: 8
PAINLEVEL_OUTOF10: 0
PAINLEVEL_OUTOF10: 7
PAINLEVEL_OUTOF10: 8

## 2019-04-29 ASSESSMENT — PAIN DESCRIPTION - PAIN TYPE: TYPE: ACUTE PAIN

## 2019-04-29 ASSESSMENT — PAIN DESCRIPTION - LOCATION: LOCATION: HIP;BACK;LEG

## 2019-04-29 ASSESSMENT — PAIN DESCRIPTION - ORIENTATION: ORIENTATION: RIGHT;MID;LOWER

## 2019-04-29 ASSESSMENT — PAIN DESCRIPTION - DESCRIPTORS: DESCRIPTORS: ACHING

## 2019-04-29 NOTE — CARE COORDINATION
Spoke with patient at bedside. He stated that he still just wants to return home once everything is arranged. He is prepared for his home evaluation today. His DME did not get delivered over the weekend from the [address]. Explained writer will follow up with the [address] Patient stated that the Ramp is not \"a big deal\" for him as he won't be going in and out of the house much once he gets home. He is aware that it is a recommendation from the treatment team.     Writer received email from [facility] about [facility]. RN on discharge to take prescriptions to [facility] for them to be filled. Juana Pink RN aware of the home eval planned for today. At 3:07pm: attempted to update Ana at [facility] but there was no answer and the voicemail was full.      Levi Jeff MSW, LSW

## 2019-04-29 NOTE — CARE COORDINATION
Met with patient to discuss his home eval, he stated that it went well but he needs something else for his bathroom. Explained writer will discuss with Ajay Sebastian and notify the [place]; he is aware that there may be a delay on arrival. He stated understanding. He is asking to use [facility] upon discharge but needs to notify the [place] on what medications he needs. Explained his  Luisito Carmichael is out of the office but [address] will try again. Nichole with therapy aware to have Chun Dominguez OT find the writer to discuss further DME needs. Placed call to [facility] at [phone] and spoke with Theresa Ogden who transferred writer to [facility]. She stated that Luisito Carmichael is \"out of the office for a while\" but Almitamarcie Andradeon is covering (ext [phone]). Writer left a message for Almita López asking for a call back. Addendum at 2:23pm: Spoke with Chun Dominguez OT who stated that patient needs a 3 and 1 Commode to go over the toilet and a Tub Transfer Bench but a Shower Chair would work since it's been ordered. Will update Gisel at the [place] once call back received.      Brigitte Hawthorne MSW, LSW

## 2019-04-29 NOTE — CARE COORDINATION
Placed call to Ozzy Johnson 150 PT at the [facility] ([address]) and left a message asking for a call back to get an update on the DME.      Sandi Guthrie MSW, LSW

## 2019-04-29 NOTE — PROGRESS NOTES
Occupational Therapy  Facility/Department: [facility]  Daily Treatment Note  NAME: Keaton Mancia  : 1945  MRN: [id number]    Date of Service: 2019    Discharge Recommendations:  Home with Home health OT, Home with assist PRN  OT Equipment Recommendations  Equipment Needed: Yes  Mobility Devices: ADL Assistive Devices  ADL Assistive Devices: Toileting - 3-in-1 Commode;Transfer Tub Bench; Shower Chair with back  Other: TTB would be safest option after home eval completion,  reports shower chiar ordered. If pt gets shower chair, recommend waiting to shower until after HHOT    Assessment   Performance deficits / Impairments: Decreased functional mobility ; Decreased ADL status; Decreased strength;Decreased safe awareness;Decreased endurance;Decreased balance  Assessment: Pt pleasant and cooperative, seen for home eval this date with PT to assess interdisciplinary environmental barriers and assess pt ability to function safely in home environment. Pt able to perform all transfers Mod I, maneuver in home with w/c, with a few items being moved for accessibility. Recommend 3-in-1 commode frame for over toilet, as toilet is very low in home and would cause increased WB to R LE/acetabulum with sitting on low surface. Also recommend pt have shower doors removed and place a curtain with TTB.  reports shower chair ordered, if pt uses shower chair, recommend pt wait to shower until assessed for safety by [address] [place] [address]. Pt tolerates home eval well and does well in home environment. Patient Education: OT role, ADLs, transfers, WB status, DME needs, bathroom modifications  Activity Tolerance  Activity Tolerance: Patient Tolerated treatment well  Safety Devices  Safety Devices in place: Yes  Type of devices: Bed alarm in place; Patient at risk for falls; Left in bed;Call light within reach         Patient Diagnosis(es): There were no encounter diagnoses.       has a past medical history of Allergic rhinitis, Anxiety, COPD (chronic obstructive pulmonary disease) ([facility] [address] Emphysema of lung ([facility] [address] GERD (gastroesophageal reflux disease), Hypercholesteremia, Hypertension, Lumbar radiculopathy, and PTSD (post-traumatic stress disorder). has no past surgical history on file. Restrictions  Lower Extremity Weight Bearing Restrictions  Right Lower Extremity Weight Bearing: Flat Foot Weight Bearing  Left Lower Extremity Weight Bearing: Weight Bearing As Tolerated  Position Activity Restriction  Other position/activity restrictions:  AVOID sheer to buttocks, trialling ROHO due to nonintact buttock skin. Flat foot Toe touch weightbearing on RLE, weightbearing as tolerated on LLE. Notify physician for pulse less than 50 or greater than 120, respiratory rate less than 12 or greater than 25,systolic BP less than 90 or greater than 312, diastolic BP less than 50 or greater than 100.   Subjective   General  Chart Reviewed: Yes  Patient assessed for rehabilitation services?: Yes  Additional Pertinent Hx: anxiety, COPD, emphysema, GERD, HTN, PTSD, lumbar radiculopathy, pelvic fx, sacral fx, fracture of R hip, pubic rami fx, laryngeal CA s/p sx, RCC s/p partial nephrectomy  Family / Caregiver Present: Yes(friend)  Referring Practitioner: Karri Lopez MD  Diagnosis: fall, s/p multiple fractures  Subjective  Subjective: Pt in w/c, seen for home eval  General Comment  Comments: RN cleared for treatment      Orientation  Orientation  Overall Orientation Status: Within Functional Limits  Objective       Instrumental ADL's  Instrumental ADLs: Yes  Meal Prep  Meal Prep Level: Wheelchair  Meal Prep Level of Assistance: Modified independent  Meal Preparation: pt able to retrieve items from fridge/freezer, simulate microwaving and transporting food on plate from counter to table mod I. Encouraged pt to keep plates, etc on low shelves or on counter to minimize standing     Balance  Sitting Balance: Independent  Standing Balance: Modified independent   Standing Balance  Time: <1 min x 4  Activity: transfers  Sit to stand: Modified independent  Stand to sit: Modified independent  Functional Mobility  Functional - Mobility Device: Wheelchair  Activity: Other  Assist Level: Modified independent   Toilet Transfers  Toilet Transfers Comments: pt will need a 3-in-1 commode frame for home toilet d/t low surface     Transfers  Stand Pivot Transfers: Modified independent  Sit to stand: Modified independent  Transfer Comments: pt able to adhere to WB status with SPTs and sit to stands        Additional Activities Comment  Additional Activities: Home Assessment  Additional Activities: pt seen with PT for home eval, pt able to complete car transfers and all transfers in home mod I. Recommend 3-in-1 commode for over pt toilet, as toilet is very low and would cause excessive WBing to R LE, also recommend that pt remove shower doors and put up curtain, with TTB. SW states shower chair has been ordered. Pt would be more independent with TTB, however, if pt receives shower chair, recommend pt waiting to shower until [address] able to practice with him.         Plan   Plan  Times per week: 5/7 days/wk  Current Treatment Recommendations: Strengthening, Balance Training, Functional Mobility Training, Endurance Training, Patient/Caregiver Education & Training, Equipment Evaluation, Education, & procurement, Self-Care / ADL    Goals  Short term goals  Time Frame for Short term goals: to be met by 4/18/19  Short term goal 1: Pt will complete LB ADLs with min A with AE as needed-met 4/25  Short term goal 2: Pt will complete functional transfers with Min A-met 4/15  Short term goal 3: Pt will complete toileting with min A or less-met 4/17  Short term goal 4: pt will adhere to WB status with all transfers and mobility-met 4/18  Long term goals  Time Frame for Long term goals : to be met 4/25/19  Long term goal 1: Pt will complete ADLs Mod I with AE as needed GOAL MET 4/26/19  Long term goal 2: Pt will complete all fucntional transfers Mod I-met 4/23  Long term goal 3: Pt will complete toileting Mod I-met 4/23  Long term goal 4: Pt will tolerate standing at sink for grooming tasks >5 min w/o fatigue or LOB   Long term goal 5: Pt will complete light home mgmt/meal prep tasks Mod I-met 4/29  Patient Goals   Patient goals : \"go home, take care of myself\"        Therapy Time   Individual Concurrent Group Co-treatment   Time In       1000   Time Out       1010   Minutes       10   Timed Code Treatment Minutes:  10 Minutes  Therapy Time   Individual Concurrent Group Co-treatment   Time In 4295     4225   Time Out 1200     1120   Minutes 20     50   Timed Code Treatment Minutes: Dieudonne [address], OT

## 2019-04-29 NOTE — PROGRESS NOTES
Physical Therapy  Facility/Department: [facility]  Daily Treatment Note  NAME: Melania Davis  : 1945  MRN: [id number]    Date of Service: 2019    Discharge Recommendations:  Continue to assess pending progress        Patient Diagnosis(es): There were no encounter diagnoses. has a past medical history of Allergic rhinitis, Anxiety, COPD (chronic obstructive pulmonary disease) ([facility] [address] Emphysema of lung ([facility] [address] GERD (gastroesophageal reflux disease), Hypercholesteremia, Hypertension, Lumbar radiculopathy, and PTSD (post-traumatic stress disorder). has no past surgical history on file. Restrictions  Lower Extremity Weight Bearing Restrictions  Right Lower Extremity Weight Bearing: Flat Foot Weight Bearing  Left Lower Extremity Weight Bearing: Weight Bearing As Tolerated  Position Activity Restriction  Other position/activity restrictions:  AVOID sheer to buttocks, trialling ROHO due to nonintact buttock skin. Flat foot Toe touch weightbearing on RLE, weightbearing as tolerated on LLE. Notify physician for pulse less than 50 or greater than 120, respiratory rate less than 12 or greater than 25,systolic BP less than 90 or greater than 749, diastolic BP less than 50 or greater than 100. Subjective  Pt reports  \"I am not feeling pain  Unless I feel myself where it is there. .ibuprofen've decided not to have pain any more. \" With walking no pain and notes no pain during p.m. treatment. Pt notes tenderness in areas of ecchymosis on RLE in thigh area. PT states for am tx. \"I don't know what the pain is. \"             Orientation : alert and oriented x3      Cognition  Follows      Objective   Bed mobility  Rolling to Left: Independent  Supine to Sit: Independent  Sit to Supine: Independent  Scooting: Independent  Transfers with FWW TDWB RLE foot flat with good technique. Pt advised to slow down during transfers at home. No lob noted.    Sit to Stand: Modified independent  Stand to sit: Modified independent  Bed to Chair: Modified independent  Car Transfer: Modified independent  Ambulation  Ambulation?: Yes  WB Status: foot flat touch down WB RLe  WBAT LLE  Ambulation 1  Surface: level tile  Device: Rolling Walker  Assistance: Modified Independent  Quality of Gait: step to gait  Distance: 10 feet on carpet with left.right turns and 6 feet and 8 feet in and out of bathroom SYL. Pt advised to have his family move  Dressing table with mirror out of walking path near bed/bathroom. Pt had to have PT and family move bookcase obstructing hallway path to bed/bath. Clear to enter bedroom from kitchen. Pt bumped into  Garage 1 step entry with threshold, DEP to enter and exit in WC. 18 inch wide WC fits in entry doory, hallway, kicthen, family, bed room and living room bath door 27 inch wide and 18inch wide WC fits in door. Gait greater than transfer distance step to gait RLE TDWB foot flat RLE and WBAT LLE supervision with FWW with 2 turns. 68 feet  Stairs/Curb  Stairs?: Yes(unsafe to attempt due to WB )  Stairs  # Steps : 1  Assistance: Dependent/Total  Wheelchair Activities  Level of Assistance for pressure relief activities: Dependent/Total(total forward up and back down )  Left Leg Rest Level of Assistance: Independent  Right Leg Rest Level of Assistance: Independent  Left Brakes Level of Assistance: Independent  Right Brakes Level of Assistance: Independent  Propulsion: Yes  Propulsion 1  Propulsion: Manual  Level: Level Tile  Method: (ROSHAN PAULA )  Level of Assistance: Modified independent  Description/ Details: able to negotiate all areas in home with  Distance: 150' (min of 150 feet able to wheel babout )    Pt demo up and down 20 feet ramp with WC and total distance with left/right turns 778 feet indep. Pt demo WC<>bed spt RLE TDWB foot flat SYL  PT with WC propulsion with PT 90% or greater in pm but needed brief plb rest after all activities due to sob on RA.        Assessment  Patient  seen for co-tx  For household accessibility evaluations. Recommended pt remove bookcase from hallway (removed during tx) recommended pt have removed dressing table in bedroom as it blocks bath and bed path negotiation. Recommended pt keep loose scatter rugs picked up. Noted door to bedroom and bathroom 27 inch wide with this width able to be accommodated by 18 inch wheel chairt. Pt requires Dep assist in and out of home without ramp. Recommend ramped entrance into garage. Pt demo ability to propel  WC indep in home and transfer to bed and chair with FWW indep in home with exception of dressing table blocking clear path to bed and bathroom which family plans to move so pt can access safely both areas. Pt demo indep [place] around aviles to kitchen from entry way, kitchen, living room, office/den, bedroom and able to walk if turns walker sideways near dressing table from walker to bed/bathroom. Pt able to navigate/turn 360 in bathroom with leg rests removed from [address]. Pt demo indep transfers bed<>WC  With FWW and in and out of car as long as someone open and closed passenger door. PT has met established goals. Recommend 18 inch wide WC and FWW for discharge to home as well as ramp for entry and further therapy to advance gait when ortho advances pt's WB orders. Recommend prn assist for community mobility from friends/family or Home health aide. Goals  Short term goals  Time Frame for Short term goals: To be met by 4/18/19  Short term goal 1: Pt to perform supine to/from sit transfers at flat bed without rails with supervision. GOAL met 4/15/2019 Pt demo supervision/SBA for sup<>sit transfers. Short term goal 2: Pt to perform sit to/from stand at [facility] with CGA. GOAL met 4/17/2019 CGA with cues for sit<>stand with FWW from mat/chair/WC. Short term goal 3: Pt to ambulate 50 ft with LRAD with CGA for household mobility.   GOAL not 4/23/2019 met PT SYL for transfer distance walking but requires supervision/SBA for gait greater distances walks 20 feet wtih FWW RLE footflat TDWB LLE WBAT with further distances limited by Fx (pelvis and acetabulum)  associated pain, WB limitation and limited endurance. Short term goal 4: Pt to propel wheelchair 50 ft on level surface with mod I for household mobility. GOAL MET 4/12/2019 Patient demo 205 feet SYL WC propulsion level and carpet with left/.right tuns and thru doorways  Short term goal 5: Pt to demonstrate car transfer with RW with mod A x 1. GOAL met 4/19/2019 on return demo pt superivsion with car transfer with car transfer simulator  with FWW TDWB foot flat RLE  to/from WC. Long term goals  Time Frame for Long term goals : To be met by 4/25/19  Long term goal 1: Pt to perform supine to/from sit transfers at flat bed without rails with mod I. GOAL MET 4/19/2019 Pt indep sup<>sit from [facility] flat without use of rails   Long term goal 2: Pt to perform sit to/from stand at LRAD with mod I. GOAL met 4/23/2019 pt demo  SYL with sit<.stand with FWW and WBAT LLE and TDWB RLE. Long term goal 3: Pt to propel WC  150 ft with LRAD with mod I for community mobility. 4/22/2019  GOAL met pt demo  feet propulsion with WC including 10 foot ramp left/right turn and carpet. Long term goal 4: Pt to manage 1 curb step with wheelchair or LRAD with mod I for home entry. DC this goal4/23/2019 and recommend ramped entrance as suggested to patient  as pt not appriate for steps from safety perspective due to pelvic Fx acetabular Fx with WBAT LLE and TDWB RLE with foot flat.    Long term goal 5: /  Patient Goals   Patient goals : \"to be home and able to drive and do what I was doing\"    Plan    Plan  Times per week: 5/7 days week   Times per day: Daily  Current Treatment Recommendations: Strengthening, Functional Mobility Training, Wheelchair Mobility Training, Neuromuscular Re-education, Home Exercise Program, Equipment Evaluation, Education, & procurement, Safety Education & Training, Gait

## 2019-04-30 PROCEDURE — 97116 GAIT TRAINING THERAPY: CPT

## 2019-04-30 PROCEDURE — 97110 THERAPEUTIC EXERCISES: CPT

## 2019-04-30 PROCEDURE — 6370000000 HC RX 637 (ALT 250 FOR IP): Performed by: PHYSICAL MEDICINE & REHABILITATION

## 2019-04-30 PROCEDURE — 97530 THERAPEUTIC ACTIVITIES: CPT

## 2019-04-30 PROCEDURE — 97535 SELF CARE MNGMENT TRAINING: CPT

## 2019-04-30 PROCEDURE — 94640 AIRWAY INHALATION TREATMENT: CPT

## 2019-04-30 PROCEDURE — 1280000000 HC REHAB R&B

## 2019-04-30 PROCEDURE — 6360000002 HC RX W HCPCS: Performed by: PHYSICAL MEDICINE & REHABILITATION

## 2019-04-30 RX ADMIN — PANTOPRAZOLE SODIUM 40 MG: 40 TABLET, DELAYED RELEASE ORAL at 05:53

## 2019-04-30 RX ADMIN — BACLOFEN 10 MG: 10 TABLET ORAL at 13:24

## 2019-04-30 RX ADMIN — GUAIFENESIN 600 MG: 600 TABLET, EXTENDED RELEASE ORAL at 08:29

## 2019-04-30 RX ADMIN — GABAPENTIN 1200 MG: 300 CAPSULE ORAL at 21:02

## 2019-04-30 RX ADMIN — ASPIRIN 81 MG 81 MG: 81 TABLET ORAL at 08:29

## 2019-04-30 RX ADMIN — OXYCODONE HYDROCHLORIDE 10 MG: 5 TABLET ORAL at 08:29

## 2019-04-30 RX ADMIN — OXYCODONE HYDROCHLORIDE 10 MG: 5 TABLET ORAL at 21:02

## 2019-04-30 RX ADMIN — GABAPENTIN 1200 MG: 300 CAPSULE ORAL at 15:17

## 2019-04-30 RX ADMIN — SENNOSIDES AND DOCUSATE SODIUM 1 TABLET: 8.6; 5 TABLET ORAL at 21:02

## 2019-04-30 RX ADMIN — TIOTROPIUM BROMIDE 18 MCG: 18 CAPSULE ORAL; RESPIRATORY (INHALATION) at 07:47

## 2019-04-30 RX ADMIN — MAGNESIUM GLUCONATE 500 MG ORAL TABLET 400 MG: 500 TABLET ORAL at 08:28

## 2019-04-30 RX ADMIN — LORAZEPAM 1 MG: 1 TABLET ORAL at 21:02

## 2019-04-30 RX ADMIN — AMLODIPINE BESYLATE 5 MG: 5 TABLET ORAL at 08:29

## 2019-04-30 RX ADMIN — PRAVASTATIN SODIUM 40 MG: 40 TABLET ORAL at 21:02

## 2019-04-30 RX ADMIN — GUAIFENESIN 600 MG: 600 TABLET, EXTENDED RELEASE ORAL at 21:02

## 2019-04-30 RX ADMIN — Medication 2 PUFF: at 07:47

## 2019-04-30 RX ADMIN — Medication 2 PUFF: at 20:19

## 2019-04-30 RX ADMIN — SENNOSIDES AND DOCUSATE SODIUM 1 TABLET: 8.6; 5 TABLET ORAL at 08:29

## 2019-04-30 RX ADMIN — BACLOFEN 10 MG: 10 TABLET ORAL at 21:02

## 2019-04-30 RX ADMIN — TAMSULOSIN HYDROCHLORIDE 0.4 MG: 0.4 CAPSULE ORAL at 08:28

## 2019-04-30 RX ADMIN — OXYCODONE HYDROCHLORIDE 10 MG: 5 TABLET ORAL at 13:22

## 2019-04-30 RX ADMIN — VITAMIN D, TAB 1000IU (100/BT) 1000 UNITS: 25 TAB at 08:29

## 2019-04-30 RX ADMIN — DIPHENHYDRAMINE HCL 25 MG: 25 TABLET ORAL at 21:02

## 2019-04-30 RX ADMIN — BACLOFEN 10 MG: 10 TABLET ORAL at 08:29

## 2019-04-30 RX ADMIN — ENOXAPARIN SODIUM 40 MG: 40 INJECTION SUBCUTANEOUS at 08:28

## 2019-04-30 RX ADMIN — GABAPENTIN 1200 MG: 300 CAPSULE ORAL at 08:29

## 2019-04-30 ASSESSMENT — PAIN DESCRIPTION - LOCATION
LOCATION: LEG
LOCATION: BACK;PELVIS
LOCATION: HIP;BACK;LEG
LOCATION: PELVIS;HIP

## 2019-04-30 ASSESSMENT — PAIN DESCRIPTION - ORIENTATION
ORIENTATION: RIGHT

## 2019-04-30 ASSESSMENT — PAIN DESCRIPTION - FREQUENCY: FREQUENCY: CONTINUOUS

## 2019-04-30 ASSESSMENT — PAIN SCALES - GENERAL
PAINLEVEL_OUTOF10: 8
PAINLEVEL_OUTOF10: 2
PAINLEVEL_OUTOF10: 7
PAINLEVEL_OUTOF10: 7
PAINLEVEL_OUTOF10: 8
PAINLEVEL_OUTOF10: 7
PAINLEVEL_OUTOF10: 8

## 2019-04-30 ASSESSMENT — PAIN DESCRIPTION - PAIN TYPE
TYPE: ACUTE PAIN

## 2019-04-30 ASSESSMENT — PAIN DESCRIPTION - DESCRIPTORS
DESCRIPTORS: ACHING;THROBBING
DESCRIPTORS: ACHING
DESCRIPTORS: ACHING;RADIATING

## 2019-04-30 ASSESSMENT — PAIN DESCRIPTION - PROGRESSION
CLINICAL_PROGRESSION: NOT CHANGED
CLINICAL_PROGRESSION: GRADUALLY IMPROVING

## 2019-04-30 ASSESSMENT — PAIN DESCRIPTION - ONSET: ONSET: ON-GOING

## 2019-04-30 ASSESSMENT — PAIN - FUNCTIONAL ASSESSMENT
PAIN_FUNCTIONAL_ASSESSMENT: PREVENTS OR INTERFERES SOME ACTIVE ACTIVITIES AND ADLS
PAIN_FUNCTIONAL_ASSESSMENT: ACTIVITIES ARE NOT PREVENTED

## 2019-04-30 NOTE — PROGRESS NOTES
Physical Therapy  Facility/Department: [facility]  Daily Treatment Note  NAME: Chai Oh  : 1945  MRN: [id number]    Date of Service: 2019    Discharge Recommendations:  Continue to assess pending progress        Patient Diagnosis(es): There were no encounter diagnoses. has a past medical history of Allergic rhinitis, Anxiety, COPD (chronic obstructive pulmonary disease) ([facility] [address] Emphysema of lung ([facility] [address] GERD (gastroesophageal reflux disease), Hypercholesteremia, Hypertension, Lumbar radiculopathy, and PTSD (post-traumatic stress disorder). has no past surgical history on file. Restrictions  Lower Extremity Weight Bearing Restrictions  Right Lower Extremity Weight Bearing: Flat Foot Weight Bearing  Left Lower Extremity Weight Bearing: Weight Bearing As Tolerated  Position Activity Restriction  Other position/activity restrictions:  AVOID sheer to buttocks, trialling ROHO due to nonintact buttock skin. Flat foot Toe touch weightbearing on RLE, weightbearing as tolerated on LLE. Notify physician for pulse less than 50 or greater than 120, respiratory rate less than 12 or greater than 25,systolic BP less than 90 or greater than 486, diastolic BP less than 50 or greater than 100. Subjective PT endorses pain as 7-8/10 during beginning of first session and start of second session with pt stating, \"I am starting to ignore the pain. \" Pt medicated for pain prior to first tx. Orientation to person, place, date     Cognition  Pt follows 2 step instruction but requires redirection to task at times. Pt takes greater amount of time for transfers intermittently due to need for redirection.       Objective   Bed mobility  Rolling to Right: Independent  Supine to Sit: Independent  Sit to Supine: Independent  Scooting: Independent  Transfers  Sit to Stand: Modified independent(FWW)  Stand to sit: Modified independent(FWW)  Bed/Mat to Chair: Modified independent(FWW) x3  Car Transfer: Modified independent(FWW car transfer simulator )      Gait with FWW step to technique TDWB foot flat RLE and WBAT LLE SBA 68 feet. PT reports at start of tx 8/10 pain radiating from groin and left rib  pain. WC propulsion 160 feet x1 and 850 feet with 20 feet of ramp up<>down  carpet and level left/rigth turns SYL with BUE       Exercises  Gluteal Sets: x20 BLE   Knee Long Arc Quad: x30 BLE  Knee Short Arc Quad: x30 BLE   Ankle Pumps: x30 RLE, x30 red LLE     Second session:   Transfer:  WC sit<>Stand with FWW RLE foot flat TDWB SYL 2/2 trials   BED<>WC SYL SPT on LLE  Gait with WC follow supervision 87 feet with RLE foot flat TDWB  WC propulsion 10 feet carpet and level 325 feet Syl    Assessment  Patient seen for endurance activities for LE therex, gait, transfers, WC propulsion. PT seen for split session. Pt continues to function at [facility] level for transfers and propulsion. Indep in LE hep. Pt noted his walking distance is limited now due to soreness and fatigue in his hands. Continue to progress with walking and WC endurance. Pt awaiting DME from [place] for home. Goals  Short term goals  Time Frame for Short term goals: To be met by 4/18/19  Short term goal 1: Pt to perform supine to/from sit transfers at flat bed without rails with supervision. GOAL met 4/15/2019 Pt demo supervision/SBA for sup<>sit transfers. Short term goal 2: Pt to perform sit to/from stand at [facility] with CGA. GOAL met 4/17/2019 CGA with cues for sit<>stand with FWW from mat/chair/WC. Short term goal 3: Pt to ambulate 50 ft with LRAD with CGA for household mobility. GOAL not 4/23/2019 met PT SYL for transfer distance walking but requires supervision/SBA for gait greater distances walks 20 feet wtih FWW RLE footflat TDWB LLE WBAT with further distances limited by Fx (pelvis and acetabulum)  associated pain, WB limitation and limited endurance.    Short term goal 4: Pt to propel wheelchair 50 ft on level surface with mod I for household mobility. GOAL MET 4/12/2019 Patient demo 205 feet SYL WC propulsion level and carpet with left/.right tuns and thru doorways  Short term goal 5: Pt to demonstrate car transfer with RW with mod A x 1. GOAL met 4/19/2019 on return demo pt superivsion with car transfer with car transfer simulator  with FWW TDWB foot flat RLE  to/from WC. Long term goals  Time Frame for Long term goals : To be met by 4/25/19  Long term goal 1: Pt to perform supine to/from sit transfers at flat bed without rails with mod I. GOAL MET 4/19/2019 Pt indep sup<>sit from [facility] flat without use of rails   Long term goal 2: Pt to perform sit to/from stand at LRAD with mod I. GOAL met 4/23/2019 pt demo  SYL with sit<.stand with FWW and WBAT LLE and TDWB RLE. Long term goal 3: Pt to propel WC  150 ft with LRAD with mod I for community mobility. 4/22/2019  GOAL met pt demo  feet propulsion with WC including 10 foot ramp left/right turn and carpet. Long term goal 4: Pt to manage 1 curb step with wheelchair or LRAD with mod I for home entry. DC this goal4/23/2019 and recommend ramped entrance as suggested to patient  as pt not appriate for steps from safety perspective due to pelvic Fx acetabular Fx with WBAT LLE and TDWB RLE with foot flat.    Long term goal 5: /  Patient Goals   Patient goals : \"to be home and able to drive and do what I was doing\"    Plan    Plan  Times per week: 5/7 days week   Times per day: Daily  Current Treatment Recommendations: Strengthening, Functional Mobility Training, Wheelchair Mobility Training, Neuromuscular Re-education, Home Exercise Program, Equipment Evaluation, Education, & procurement, Safety Education & Training, Gait Training, Transfer Training, ROM, Balance Training, Endurance Training, Stair training, Pain Management, Patient/Caregiver Education & Training, Positioning  Safety Devices  Type of devices: (left up in w/c with PT present for next session)  Restraints  Initially in place: No     Therapy Time   Individual Concurrent Group Co-treatment   Time In 0830         Time Out 0930         Minutes 60         Timed Code Treatment Minutes: 60 Minutes    Second Session Therapy Time:   Individual Concurrent Group Co-treatment   Time In 6270         Time Out 3580         Minutes 30           Timed Code Treatment Minutes:  30    Total Treatment Minutes:  90      Aristides Valdivia, PT

## 2019-04-30 NOTE — PROGRESS NOTES
Occupational Therapy  Facility/Department: [facility]  Daily Treatment Note  NAME: Chai Oh  : 1945  MRN: [id number]    Date of Service: 2019    Discharge Recommendations:  Home with Home health OT, Home with assist PRN       Assessment   Performance deficits / Impairments: Decreased functional mobility ; Decreased ADL status; Decreased strength;Decreased safe awareness;Decreased endurance;Decreased balance  Assessment: Pt continues to be agreeable to treatment, presenting at Mod I for functional transfers, but declining standing activity this session. Pt will fair tolerance of seated UE exercises, demonstrating understanding of education provided on breathing techniques/energy conservation this session. Continue OT tx. Patient Education: breathing techniques, energy conservation  REQUIRES OT FOLLOW UP: Yes  Activity Tolerance  Activity Tolerance: Patient Tolerated treatment well  Safety Devices  Safety Devices in place: Yes  Type of devices: Bed alarm in place; Patient at risk for falls; Left in bed;Call light within reach;Nurse notified         Patient Diagnosis(es): There were no encounter diagnoses. has a past medical history of Allergic rhinitis, Anxiety, COPD (chronic obstructive pulmonary disease) ([address] Emphysema of lung ([address] GERD (gastroesophageal reflux disease), Hypercholesteremia, Hypertension, Lumbar radiculopathy, and PTSD (post-traumatic stress disorder). has no past surgical history on file. Restrictions  Lower Extremity Weight Bearing Restrictions  Right Lower Extremity Weight Bearing: Flat Foot Weight Bearing  Left Lower Extremity Weight Bearing: Weight Bearing As Tolerated  Position Activity Restriction  Other position/activity restrictions:  AVOID sheer to buttocks, trialling ROHO due to nonintact buttock skin. Flat foot Toe touch weightbearing on RLE, weightbearing as tolerated on LLE.   Notify physician for pulse less than 50 or greater than 120, respiratory rate less than 12 or greater than 25,systolic BP less than 90 or greater than 422, diastolic BP less than 50 or greater than 100. Subjective   General  Chart Reviewed: Yes  Patient assessed for rehabilitation services?: Yes  Additional Pertinent Hx: anxiety, COPD, emphysema, GERD, HTN, PTSD, lumbar radiculopathy, pelvic fx, sacral fx, fracture of R hip, pubic rami fx, laryngeal CA s/p sx, RCC s/p partial nephrectomy  Family / Caregiver Present: No  Referring Practitioner: Augustin Larios MD  Diagnosis: fall, s/p multiple fractures  Subjective  Subjective: Pt in bed on arrival, agreeable to treatment  General Comment  Comments: RN cleared for treatment  Pain Assessment  Pain Assessment: 0-10  Pain Level: 7  Pain Location: Leg  Pain Orientation: Right  Pain Descriptors: Aching  Non-Pharmaceutical Pain Intervention(s): Ambulation/Increased Activity;Repositioned; Emotional support  Response to Pain Intervention: Patient Satisfied  Vital Signs  Patient Currently in Pain: Yes   Orientation  Orientation  Overall Orientation Status: Within Functional Limits  Objective    ADL  Additional Comments: Pt declined need for ADL this session        Standing Balance  Time: <1 min 2x  Activity: transfers  Sit to stand: Modified independent  Stand to sit: Modified independent  Functional Mobility  Functional - Mobility Device: Wheelchair  Activity: Other  Assist Level: Modified independent   Bed mobility  Supine to Sit: Independent  Sit to Supine: Independent  Scooting: Independent(to EOB and further HOB)  Transfers  Stand Pivot Transfers: Modified independent  Sit to stand: Modified independent  Stand to sit: Modified independent     Cognition  Overall Cognitive Status: Exceptions  Attention Span: Attends with cues to redirect(pt distracted by conversation at times)  Memory: Decreased short term memory        Type of ROM/Therapeutic Exercise  Type of ROM/Therapeutic Exercise: AROM  Comment: 3# BUE seated  Exercises  Shoulder Depression: x20  Shoulder Elevation: x20  Horizontal ABduction: x20  Horizontal ADduction: x20  Elbow Flexion: x20  Elbow Extension: x20  Supination: x20  Pronation: x20  Wrist Flexion: x20  Wrist Extension: x20  Other: x20 chest press      Plan   Plan  Times per week: 5/7 days/wk  Current Treatment Recommendations: Strengthening, Balance Training, Functional Mobility Training, Endurance Training, Patient/Caregiver Education & Training, Equipment Evaluation, Education, & procurement, Self-Care / ADL    Goals  Short term goals  Time Frame for Short term goals: to be met by 4/18/19  Short term goal 1: Pt will complete LB ADLs with min A with AE as needed-met 4/25  Short term goal 2: Pt will complete functional transfers with Min A-met 4/15  Short term goal 3: Pt will complete toileting with min A or less-met 4/17  Short term goal 4: pt will adhere to WB status with all transfers and mobility-met 4/18  Long term goals  Time Frame for Long term goals : to be met 4/25/19  Long term goal 1: Pt will complete ADLs Mod I with AE as needed GOAL MET 4/26/19  Long term goal 2: Pt will complete all fucntional transfers Mod I-met 4/23  Long term goal 3: Pt will complete toileting Mod I-met 4/23  Long term goal 4: Pt will tolerate standing at sink for grooming tasks >5 min w/o fatigue or LOB   Long term goal 5: Pt will complete light home mgmt/meal prep tasks Mod I-met 4/29  Patient Goals   Patient goals : \"go home, take care of myself\"       Therapy Time   Individual Concurrent Group Co-treatment   Time In 1030         Time Out 1100         Minutes 30         Timed Code Treatment Minutes: 30 Minutes       Harley Abarca OTR/L

## 2019-04-30 NOTE — PROGRESS NOTES
Renan Sep  4/30/2019  [id number]    Chief Complaint: Debility    Subjective:   No acute events overnight. Patient seen this afternoon sitting up in room. Remains frustrated about discharge planning but more confident about ability to function at home after home visit. ROS: No cp, sob, n/v  Objective:  Patient Vitals for the past 24 hrs:   BP Temp Temp src Pulse Resp SpO2   04/30/19 0826 (!) 110/54 98.9 °F (37.2 °C) Oral 86 18 92 %   04/30/19 0747 -- -- -- -- -- 96 %   04/29/19 2047 (!) 130/54 98.3 °F (36.8 °C) Oral 73 20 92 %   04/29/19 2019 -- -- -- -- -- 91 %     Gen: No distress, pleasant. HEENT: Normocephalic, atraumatic. CV: Regular rate and rhythm. Resp: No respiratory distress. Abd: Soft, nontender   Ext: No edema. Neuro: Alert, oriented, appropriately interactive. Wt Readings from Last 3 Encounters:   04/29/19 168 lb 6.9 oz (76.4 kg)       Laboratory data:   Lab Results   Component Value Date    WBC 6.5 04/29/2019    HGB 12.9 (L) 04/29/2019    HCT 39.0 (L) 04/29/2019    MCV 94.6 04/29/2019     04/29/2019       Lab Results   Component Value Date     04/29/2019    K 4.5 04/29/2019    CL 98 04/29/2019    CO2 31 04/29/2019    BUN 16 04/29/2019    CREATININE 0.8 04/29/2019    GLUCOSE 104 04/29/2019    CALCIUM 8.9 04/29/2019        Therapy progress:  PT  Position Activity Restriction  Other position/activity restrictions:  AVOID sheer to buttocks, trialling ROHO due to nonintact buttock skin. Flat foot Toe touch weightbearing on RLE, weightbearing as tolerated on LLE. Notify physician for pulse less than 50 or greater than 120, respiratory rate less than 12 or greater than 25,systolic BP less than 90 or greater than 056, diastolic BP less than 50 or greater than 100.   Objective     Sit to Stand: Modified independent(FWW)  Stand to sit: Modified independent(FWW)  Bed to Chair: Modified independent(FWW)  Stand Pivot Transfers: Modified independent(with use of bedrail to w/c)  Device: coordination. Anticipated dispo: home  Services: Bobby Gregorio PT, OT, RN  DME: wheelchair, walker, TTB, bedside commode  ELOS: TBD once [facility]    Cholo Berger was evaluated today and a DME order was entered for a tub transfer bench because he requires this to successfully complete daily living tasks of bathing and grooming. Patient requires a  Tub transfer bench due to weakness and limited ability to transfer/navigate the setting. Without the tub transfer bench, Cholo Berger is at increased risk for falls and would require increased assistance for ADL's and mobility. Cholo Berger requires a Bedside Commode to complete bathing, toileting, dressing and grooming tasks. Patient requires a Bedside Commode due to Upper Extremity/Lower Extremity Weakness and limited ambulation and is unable to walk to the bathroom at home. Without the Bedside Commode, Cholo Berger is at increased risk for falls and would require increased assistance for ADL's and mobility. Patricia Gottron.  Latricia Pascual MD 4/30/2019, 10:35 AM

## 2019-04-30 NOTE — PATIENT CARE CONFERENCE
[facility]  Inpatient Rehabilitation  Weekly Team Conference Note    Date:   Patient Name: Tyrone Mcdaniel        MRN: [id number]    : 1945  (78 y.o.)  Gender: male   Referring Practitioner: Bin Hunt MD  Diagnosis: Debility      Interventions to be utilized toward barriers to discharge, per discipline:  [address] observed barriers to dc: Pain, Anxiety and Lower extremity weakness  Nursing interventions:administer prn pain medication as needed, listen to verbal expression of anxiety, encourage with transfers and therapy  Family Education:   Fall Risk:  Yes      Physical therapy observed barriers to dc:    Baseline: Indep community walking no AD    Current level: SYL WC level with indep with gait for  transfers only    Barriers to DC:  Lives alone, has MR son that family/friends caring for while pt is in hospital.  Has 1 step to enter home (ramp ordered), Pt's family moving 1 dressing table for WC access so pt will be SYL in home with WC, pelvic, sacral and acetabular fx with RLE WB foot flat and LLE WBAT which prevents steps negotiation and walking household distances safely. Needs in order to achieve dc home/next level of care: ramp to enter and family/friends moving dressing table for WC access to bed/bathroom.   Pt demo SYL in [facility] at home on  2019 home visit,       Physical therapy interventions:   Current Treatment Recommendations: Strengthening, Functional Mobility Training, Wheelchair Mobility Training, Neuromuscular Re-education, Home Exercise Program, Equipment Evaluation, Education, & procurement, Safety Education & Training, Gait Training, Transfer Training, ROM, Balance Training, Endurance Training, Stair training, Pain Management, Patient/Caregiver Education & Training, Positioning      PHYSICAL THERAPY  FIMs last conference:  [employer], Chair, Wheel Chair: 6 - Requires assistive device (slide rail)  Walk: 1 - Total Assistance Walks < 50 feet OR requires two or more people OR patient performs < 25% of locomotion effort  Distance Walked: 18'  Wheel Chair: 6 - [address] Operates wheelchair at least 150 feet with an ambulatory device, orthosis or prosthesis OR requires extra amount of time OR there is concern for safety  Distance Traveled in Wheel Chair: 160'(with ramp  10 feet up and down )  Stairs: 0 - Activity Does not Occur ( 0 only for the admission assessment)          FIMS current conference:  Bed, Chair, Wheel Chair: 6 - Requires assistive device (slide rail)  Walk: 2 - Maximal Assistance Requires up to [address] requires assistance of one person to walk between  feet (Patient performs 25-49% of locomotion effort or goes between  feet)  Distance Walked: 68'(supervision with FWW)  Wheel Chair: 6 - 29 Mayra Hare Operates wheelchair at least 150 feet with an ambulatory device, orthosis or prosthesis OR requires extra amount of time OR there is concern for safety  Distance Traveled in Wheel Chair: 778'  Stairs: 1- Total Assistance perfoms less than 25% of the effort, or requirs the assistance of two people, or goes up and down fewer than 4 stairs    PT Equipment Recommendations  Equipment Needed: Yes  Mobility Devices: Wheelchair  Walker: Rolling  Wheelchair: Standard  Other: will continue to assess    Assessment: Pt continues to be mod I for transfers and wheelchair mobility. Pt ambulating greater distance this session with SBA. Pt continues to demonstrate fatigue with increased activity requiring rest breaks.         Occupational therapy observed barriers to dc:    Baseline: Pt lived with son with Down's Syndrome, pt was ind with ADLs, transfers and mobility, was caregiver for son              Current level: ADLs and transfers Mod I              Barriers to DC: son unable to physically assist              Needs in order to achieve dc home/next level of care: DME from [place], HHOT/aide, PRN assist        Occupational Therapy to DC:   Needs in order to achieve dc home/next level of care:    Speech Therapy interventions:  Dysphagia:    Speech/Language/Cognition:        SPEECH THERAPY (intentionally left blank if not actively being seen by this service):       NUTRITION  Weight: 168 lb 6.9 oz (76.4 kg) / Body mass index is 22.84 kg/m². Diet Order: DIET GENERAL;  Dietary Nutrition Supplements: Standard High Calorie Oral Supplement  PO Meals Eaten (%): 76 - 100%  Education: Declined      CASE MANAGEMENT  Assessment: Patient will go home with [employer]. DME will be delivered to his room from the [place]. He did the home eval with therapy yesterday and feels it went well         Interdisciplinary Goals:   1.) Pt consistently employing safety with transfers with all staff with regard to RLE WBAT foot flat.   2.)  3.)  4.)  5.)    Discharge Plan   Estimated discharge date: once DME is delivered from the [facility]: home health  Pass:No  Services at Discharge: [address], Occupational Therapy and Nursing Other pending eval   Equipment at Discharge: Wheelchair, [facility], [facility] [address]     Team Members Present at Conference:  : Lou Begum MSW, LSW    Occupational Therapist: Vinnie Funk OT  Physical Therapist: Justin Olguin PT  Speech Therapist: [facility]  Nurse: Moustapha Bonilla RN   Dietician: Luly Peña RDN, LD  : Tsering Anderson  Psychiatry: n/a    Family members present at conference: n/a      I led this team conference and I approve the established interdisciplinary plan of care as documented within the medical record of Rosette Hodgson. MD: Edwin Otero.  Rosalie Joseph MD 4/30/2019, 3:45 PM

## 2019-04-30 NOTE — PROGRESS NOTES
physician for pulse less than 50 or greater than 120, respiratory rate less than 12 or greater than 25,systolic BP less than 90 or greater than 802, diastolic BP less than 50 or greater than 100. Subjective   General  Chart Reviewed: Yes  Patient assessed for rehabilitation services?: Yes  Additional Pertinent Hx: anxiety, COPD, emphysema, GERD, HTN, PTSD, lumbar radiculopathy, pelvic fx, sacral fx, fracture of R hip, pubic rami fx, laryngeal CA s/p sx, RCC s/p partial nephrectomy  Family / Caregiver Present: No  Referring Practitioner: Augustin Larios MD  Diagnosis: fall, s/p multiple fractures  Subjective  Subjective: Pt in bed on arrival, agreeable to treatment  General Comment  Comments: RN cleared for treatment  Pain Assessment  Pain Level: 7  Pain Type: Acute pain  Pain Location: Pelvis; Hip  Pain Orientation: Right  Non-Pharmaceutical Pain Intervention(s): Ambulation/Increased Activity; Shower; Emotional support  Vital Signs  Patient Currently in Pain: Yes   Orientation  Orientation  Overall Orientation Status: Within Functional Limits  Objective    ADL  Feeding: Independent  Grooming: Modified independent (seated at sink in w/c)  UE Bathing: Modified independent   LE Bathing: Modified independent   UE Dressing: Modified independent   LE Dressing: Modified independent (sock aide)  Toileting: Modified independent   Additional Comments: pt able to larissa socks MOd i with sock aide        Balance  Sitting Balance: Independent  Standing Balance: Modified independent   Standing Balance  Time: <1 min x6  Activity: transfers  Sit to stand: Modified independent  Stand to sit: Modified independent  Comment: grab bars/bed rails  Functional Mobility  Functional - Mobility Device: Wheelchair  Activity: Other  Assist Level: Modified independent   Toilet Transfers  Toilet - Technique: Stand pivot  Equipment Used: Standard toilet(grab bars)  Toilet Transfer: Modified independent  Toilet Transfers Comments: pt will need a 3-in-1 Time In 1400         Time Out 1524         Minutes 84         Timed Code Treatment Minutes: [facility]

## 2019-05-01 PROCEDURE — 6360000002 HC RX W HCPCS: Performed by: PHYSICAL MEDICINE & REHABILITATION

## 2019-05-01 PROCEDURE — 6370000000 HC RX 637 (ALT 250 FOR IP): Performed by: PHYSICAL MEDICINE & REHABILITATION

## 2019-05-01 PROCEDURE — 97116 GAIT TRAINING THERAPY: CPT

## 2019-05-01 PROCEDURE — 97530 THERAPEUTIC ACTIVITIES: CPT

## 2019-05-01 PROCEDURE — 97110 THERAPEUTIC EXERCISES: CPT

## 2019-05-01 PROCEDURE — 1280000000 HC REHAB R&B

## 2019-05-01 PROCEDURE — 94640 AIRWAY INHALATION TREATMENT: CPT

## 2019-05-01 RX ADMIN — TAMSULOSIN HYDROCHLORIDE 0.4 MG: 0.4 CAPSULE ORAL at 08:43

## 2019-05-01 RX ADMIN — LORAZEPAM 1 MG: 1 TABLET ORAL at 22:45

## 2019-05-01 RX ADMIN — OXYCODONE HYDROCHLORIDE 10 MG: 5 TABLET ORAL at 22:45

## 2019-05-01 RX ADMIN — VITAMIN D, TAB 1000IU (100/BT) 1000 UNITS: 25 TAB at 08:27

## 2019-05-01 RX ADMIN — ENOXAPARIN SODIUM 40 MG: 40 INJECTION SUBCUTANEOUS at 08:27

## 2019-05-01 RX ADMIN — GABAPENTIN 1200 MG: 300 CAPSULE ORAL at 08:26

## 2019-05-01 RX ADMIN — BACLOFEN 10 MG: 10 TABLET ORAL at 22:46

## 2019-05-01 RX ADMIN — GABAPENTIN 1200 MG: 300 CAPSULE ORAL at 22:46

## 2019-05-01 RX ADMIN — AMLODIPINE BESYLATE 5 MG: 5 TABLET ORAL at 08:27

## 2019-05-01 RX ADMIN — BACLOFEN 10 MG: 10 TABLET ORAL at 14:03

## 2019-05-01 RX ADMIN — BACLOFEN 10 MG: 10 TABLET ORAL at 08:26

## 2019-05-01 RX ADMIN — Medication 2 PUFF: at 07:53

## 2019-05-01 RX ADMIN — OXYCODONE HYDROCHLORIDE 10 MG: 5 TABLET ORAL at 08:26

## 2019-05-01 RX ADMIN — GUAIFENESIN 600 MG: 600 TABLET, EXTENDED RELEASE ORAL at 17:08

## 2019-05-01 RX ADMIN — GABAPENTIN 1200 MG: 300 CAPSULE ORAL at 14:03

## 2019-05-01 RX ADMIN — OXYCODONE HYDROCHLORIDE 10 MG: 5 TABLET ORAL at 18:33

## 2019-05-01 RX ADMIN — TIOTROPIUM BROMIDE 18 MCG: 18 CAPSULE ORAL; RESPIRATORY (INHALATION) at 07:53

## 2019-05-01 RX ADMIN — ASPIRIN 81 MG 81 MG: 81 TABLET ORAL at 08:27

## 2019-05-01 RX ADMIN — OXYCODONE HYDROCHLORIDE 10 MG: 5 TABLET ORAL at 14:08

## 2019-05-01 RX ADMIN — PANTOPRAZOLE SODIUM 40 MG: 40 TABLET, DELAYED RELEASE ORAL at 06:43

## 2019-05-01 RX ADMIN — PRAVASTATIN SODIUM 40 MG: 40 TABLET ORAL at 22:46

## 2019-05-01 RX ADMIN — Medication 2 PUFF: at 20:28

## 2019-05-01 RX ADMIN — SENNOSIDES AND DOCUSATE SODIUM 1 TABLET: 8.6; 5 TABLET ORAL at 08:26

## 2019-05-01 RX ADMIN — GUAIFENESIN 600 MG: 600 TABLET, EXTENDED RELEASE ORAL at 08:27

## 2019-05-01 RX ADMIN — MAGNESIUM GLUCONATE 500 MG ORAL TABLET 400 MG: 500 TABLET ORAL at 08:43

## 2019-05-01 RX ADMIN — DIPHENHYDRAMINE HCL 25 MG: 25 TABLET ORAL at 22:45

## 2019-05-01 RX ADMIN — SENNOSIDES AND DOCUSATE SODIUM 1 TABLET: 8.6; 5 TABLET ORAL at 22:46

## 2019-05-01 ASSESSMENT — PAIN DESCRIPTION - ONSET
ONSET: ON-GOING
ONSET: ON-GOING

## 2019-05-01 ASSESSMENT — PAIN DESCRIPTION - ORIENTATION: ORIENTATION: RIGHT;LEFT

## 2019-05-01 ASSESSMENT — PAIN SCALES - GENERAL
PAINLEVEL_OUTOF10: 8
PAINLEVEL_OUTOF10: 6
PAINLEVEL_OUTOF10: 8

## 2019-05-01 ASSESSMENT — PAIN DESCRIPTION - FREQUENCY
FREQUENCY: INTERMITTENT
FREQUENCY: INTERMITTENT

## 2019-05-01 ASSESSMENT — PAIN DESCRIPTION - DESCRIPTORS
DESCRIPTORS: ACHING
DESCRIPTORS: ACHING;DISCOMFORT

## 2019-05-01 ASSESSMENT — PAIN DESCRIPTION - PAIN TYPE
TYPE: ACUTE PAIN
TYPE: ACUTE PAIN

## 2019-05-01 ASSESSMENT — PAIN DESCRIPTION - LOCATION
LOCATION: PELVIS;LEG
LOCATION: PELVIS;LEG

## 2019-05-01 ASSESSMENT — PAIN DESCRIPTION - PROGRESSION
CLINICAL_PROGRESSION: GRADUALLY IMPROVING
CLINICAL_PROGRESSION: GRADUALLY IMPROVING

## 2019-05-01 NOTE — CARE COORDINATION
Received call from Jayden Bernal at [facility] stating they left messages on patient's home phone. Explained that patient is on the ARU and his DME needs to be delivered here. Beto stated they are going to deliver the EchoStar and a 18 inch Wheelchair with Cushion to patient's ARU Room Tomorrow. The bathroom DME is being shipped to his house. Debbie Whitley when able.      Kevin Yañez MSW, LSW

## 2019-05-01 NOTE — CARE COORDINATION
Received call from Scott Schreiber at the [place] stating that he spoke with Gaurang Arredondo and the Rolling Walker/Wheelchair will be delivered today. Explained writer spoke with Gaurang Arredondo who stated tomorrow. Abdoul stated that the order for the Tub Transfer Bench and 3 and 1 Commode was placed yesterday so it \"will be a couple more days\" for that to be delivered.      Addendum at 10:52am: updated patient while working with [address] [place] [address],[address] MSW, ANAW

## 2019-05-01 NOTE — CARE COORDINATION
Received call from [employer] with the [facility] stating that she requested they expedite the delivery of the Tub Transfer Bench and Bedside Commode.      Aman HAYS, LSW

## 2019-05-01 NOTE — PROGRESS NOTES
Physical Therapy  Facility/Department: [facility] ARU  Daily Treatment Note  NAME: Mireya Sharp  : 1945  MRN: [id number]    Date of Service: 2019    Discharge Recommendations:  Continue to assess pending progress        Patient Diagnosis(es): There were no encounter diagnoses. has a past medical history of Allergic rhinitis, Anxiety, COPD (chronic obstructive pulmonary disease) ([facility] Emphysema of lung ([facility] [address] [address] GERD (gastroesophageal reflux disease), Hypercholesteremia, Hypertension, Lumbar radiculopathy, and PTSD (post-traumatic stress disorder). has no past surgical history on file. Restrictions  Lower Extremity Weight Bearing Restrictions  Right Lower Extremity Weight Bearing: Flat Foot Weight Bearing  Left Lower Extremity Weight Bearing: Weight Bearing As Tolerated  Position Activity Restriction  Other position/activity restrictions:  AVOID sheer to buttocks, trialling ROHO due to nonintact buttock skin. Flat foot Toe touch weightbearing on RLE, weightbearing as tolerated on LLE. Notify physician for pulse less than 50 or greater than 120, respiratory rate less than 12 or greater than 25,systolic BP less than 90 or greater than 235, diastolic BP less than 50 or greater than 100. Subjective Pt rates pain as 8/10 in pelvis at start and 6/10 at end of tx. Delayed start time as pt taking medications. Pt expectorating coughing throughout first session and  During second session. Offered toileting during each session with pt declining. Orientation WNL      Cognition : pt alert and employs appropriate precautions with mobility. Pt needs redirection encouragement for tasks, Pt notes he takes \"a long time to take my  Pills otherwise I choke. \"      Objective    Bed mobility:  Sup<>sit on mat  And mat<>WC SYL with FWW  Transfer bed<>WC SPT with bed rail LLE WBAT RLE foot flat TDWB  SYL  Sit>stand MOLDI with FWW LLE WBAT RLE foot flat TDWB  SYL x2 WC   Stand>sit SYL with FWWLLE WBAT RLE foot distances limited by Fx (pelvis and acetabulum)  associated pain, WB limitation and limited endurance. Short term goal 4: Pt to propel wheelchair 50 ft on level surface with mod I for household mobility. GOAL MET 4/12/2019 Patient demo 205 feet SYL WC propulsion level and carpet with left/.right tuns and thru doorways  Short term goal 5: Pt to demonstrate car transfer with RW with mod A x 1. GOAL met 4/19/2019 on return demo pt superivsion with car transfer with car transfer simulator  with FWW TDWB foot flat RLE  to/from WC. Long term goals  Time Frame for Long term goals : To be met by 4/25/19  Long term goal 1: Pt to perform supine to/from sit transfers at flat bed without rails with mod I. GOAL MET 4/19/2019 Pt indep sup<>sit from [facility] flat without use of rails   Long term goal 2: Pt to perform sit to/from stand at LRAD with mod I. GOAL met 4/23/2019 pt demo  SYL with sit<.stand with FWW and WBAT LLE and TDWB RLE. Long term goal 3: Pt to propel WC  150 ft with LRAD with mod I for community mobility. 4/22/2019  GOAL met pt demo  feet propulsion with WC including 10 foot ramp left/right turn and carpet. Long term goal 4: Pt to manage 1 curb step with wheelchair or LRAD with mod I for home entry. DC this goal4/23/2019 and recommend ramped entrance as suggested to patient  as pt not appriate for steps from safety perspective due to pelvic Fx acetabular Fx with WBAT LLE and TDWB RLE with foot flat.    Long term goal 5: /  Patient Goals   Patient goals : \"to be home and able to drive and do what I was doing\"    Plan    Plan  Times per week: 5/7 days week   Times per day: Daily  Current Treatment Recommendations: Strengthening, Functional Mobility Training, Wheelchair Mobility Training, Neuromuscular Re-education, Home Exercise Program, Equipment Evaluation, Education, & procurement, Safety Education & Training, Gait Training, Transfer Training, ROM, Balance Training, Endurance Training, Stair training, Pain Management, Patient/Caregiver Education & Training, Positioning  Safety Devices  Type of devices: (left up in w/c with PT present for next session)  Restraints  Initially in place: No     Therapy Time   Individual Concurrent Group Co-treatment   Time In 0840         Time Out 0930         Minutes 50         Timed Code Treatment Minutes: [address] Time:   Individual Concurrent Group Co-treatment   Time In 1030         Time Out 1110         Minutes 40           Timed Code Treatment Minutes:  40    Total Treatment Minutes:  90         Jaren Coombs, PT

## 2019-05-01 NOTE — CARE COORDINATION
Placed call to AYESHA Schreiber at the [facility] ([address]) to obtain an update on the DME delivery. Explained that this is the ONLY thing delaying his discharge to home. He stated that the order was processed yesterday (4-30) at 10:00am for the Wheelchair and EchoStar. He stated that he placed the order on the 25th but it then goes to another individual that has to process the order before items are shipped. MARIBEL explained that per conversation with Abdoul on 4-25, he told writer that the DME would be delivered in 3-5 days. Abdoul PT stated that he will continue to work on determining a better estimated delivery date and would call the writer back around noon today.      Aneta Brought MSW, ANAW

## 2019-05-01 NOTE — PROGRESS NOTES
on LLE. Notify physician for pulse less than 50 or greater than 120, respiratory rate less than 12 or greater than 25,systolic BP less than 90 or greater than 800, diastolic BP less than 50 or greater than 100.   Subjective   General  Chart Reviewed: Yes  Patient assessed for rehabilitation services?: Yes  Additional Pertinent Hx: anxiety, COPD, emphysema, GERD, HTN, PTSD, lumbar radiculopathy, pelvic fx, sacral fx, fracture of R hip, pubic rami fx, laryngeal CA s/p sx, RCC s/p partial nephrectomy  Family / Caregiver Present: No  Referring Practitioner: Tereza Guerrier MD  Diagnosis: fall, s/p multiple fractures  Subjective  Subjective: Pt in bed on arrival, agreeable to treatment  General Comment  Comments: RN cleared for treatment      Orientation  Orientation  Overall Orientation Status: Within Functional Limits  Objective             Balance  Sitting Balance: Independent  Standing Balance: Modified independent   Standing Balance  Time: <1 minute 4x  Activity: transfers  Sit to stand: Modified independent  Stand to sit: Modified independent  Functional Mobility  Functional - Mobility Device: Wheelchair  Activity: Other  Assist Level: Modified independent      Transfers  Stand Pivot Transfers: Modified independent  Sit to stand: Modified independent  Stand to sit: Modified independent                       Cognition  Overall Cognitive Status: Exceptions  Attention Span: Attends with cues to redirect(distracted, talkative)                    Type of ROM/Therapeutic Exercise  Type of ROM/Therapeutic Exercise: AROM  Comment: 3# weights and red theraband  Exercises  Scapular Protraction: x20  Scapular Retraction: x20  Shoulder Depression: x20  Shoulder Elevation: x20  Shoulder Flexion: x20  Shoulder ABduction: x20  Shoulder ADduction: x20  Horizontal ABduction: x20  Horizontal ADduction: x20  Elbow Flexion: x30  Elbow Extension: x30  Supination: x20  Pronation: x20  Wrist Flexion: x20  Wrist Extension: x20

## 2019-05-01 NOTE — PROGRESS NOTES
Occupational Therapy  Facility/Department: [facility] ARU  Daily Treatment Note  NAME: Eden Mccord  : 1945  MRN: [id number]    Date of Service: 2019    Discharge Recommendations:  Home with Home health OT, Home with assist PRN       Assessment   Performance deficits / Impairments: Decreased functional mobility ; Decreased ADL status; Decreased strength;Decreased safe awareness;Decreased endurance;Decreased balance  Assessment: Pt continues to be agreeable to treatment and [facility] at [facility] for functional transfers, demonstrating fair tolerance of UE exercises seated at EOM. Pt required frequent redirection this session d/t distracted by conversation, and takes increased time to complete tasks. Continue OT tx.   REQUIRES OT FOLLOW UP: Yes  Activity Tolerance  Activity Tolerance: Patient Tolerated treatment well  Safety Devices  Safety Devices in place: Yes  Type of devices: Bed alarm in place; Patient at risk for falls; Left in bed;Call light within reach;Nurse notified;Gait belt         Patient Diagnosis(es): There were no encounter diagnoses. has a past medical history of Allergic rhinitis, Anxiety, COPD (chronic obstructive pulmonary disease) ([address] Emphysema of lung ([address] GERD (gastroesophageal reflux disease), Hypercholesteremia, Hypertension, Lumbar radiculopathy, and PTSD (post-traumatic stress disorder). has no past surgical history on file. Restrictions  Lower Extremity Weight Bearing Restrictions  Right Lower Extremity Weight Bearing: Flat Foot Weight Bearing  Left Lower Extremity Weight Bearing: Weight Bearing As Tolerated  Position Activity Restriction  Other position/activity restrictions:  AVOID sheer to buttocks, trialling ROHO due to nonintact buttock skin. Flat foot Toe touch weightbearing on RLE, weightbearing as tolerated on LLE.   Notify physician for pulse less than 50 or greater than 120, respiratory rate less than 12 or greater than 25,systolic BP less than 90 or greater than 170, diastolic BP less than 50 or greater than 100.   Subjective   General  Chart Reviewed: Yes  Patient assessed for rehabilitation services?: Yes  Additional Pertinent Hx: anxiety, COPD, emphysema, GERD, HTN, PTSD, lumbar radiculopathy, pelvic fx, sacral fx, fracture of R hip, pubic rami fx, laryngeal CA s/p sx, RCC s/p partial nephrectomy  Family / Caregiver Present: No  Referring Practitioner: Sunshine Conklin MD  Diagnosis: fall, s/p multiple fractures  Subjective  Subjective: Pt in bed on arrival, agreeable to treatment  General Comment  Comments: RN cleared for treatment      Orientation  Orientation  Overall Orientation Status: Within Functional Limits  Objective    ADL  Additional Comments: Pt declined need for ADL         Balance  Sitting Balance: Independent  Standing Balance: Modified independent   Standing Balance  Time: <1 minute 4x  Activity: transfers  Sit to stand: Modified independent  Stand to sit: Modified independent  Functional Mobility  Functional - Mobility Device: Wheelchair  Activity: Other  Assist Level: Modified independent   Bed mobility  Supine to Sit: Independent  Sit to Supine: Independent  Transfers  Stand Pivot Transfers: Modified independent     Cognition  Overall Cognitive Status: Exceptions  Attention Span: Attends with cues to redirect(pt distracted by conversation throughout session)     Type of ROM/Therapeutic Exercise  Comment: 2kg medicine ball, BUE seated EOM  Exercises  Elbow Flexion: x30  Elbow Extension: x30  Other: x5 trunk rotation (ended d/t c/o increased pain L flank), x25 + 10 partial sit ups, x30 chest press         Plan   Plan  Times per week: 5/7 days/wk  Current Treatment Recommendations: Strengthening, Balance Training, Functional Mobility Training, Endurance Training, Patient/Caregiver Education & Training, Equipment Evaluation, Education, & procurement, Self-Care / ADL    Goals  Short term goals  Time Frame for Short term goals: to be met by 4/18/19  Short term goal

## 2019-05-01 NOTE — CARE COORDINATION
Sushma Glover, RN told writer that patient spoke to the [facility] yesterday afternoon. They told him that the Bedside Commode and Tub Transfer Bench was approved and they would be shipped to his house but they couldn't state when. Will follow up with the [facility] in regards to the Wheelchair and Arthurine Zapata.      Elijah Turner MSW, LSW

## 2019-05-01 NOTE — PROGRESS NOTES
Tyrone Mcdaniel  5/1/2019  [id number]    Chief Complaint: Debility    Subjective:   No acute events overnight. Patient seen this afternoon in gym. ROS: No cp, sob, n/v  Objective:  Patient Vitals for the past 24 hrs:   BP Temp Temp src Pulse Resp SpO2 Weight   05/01/19 0818 137/62 97.8 °F (36.6 °C) Oral 86 16 92 % --   05/01/19 0754 -- -- -- -- -- 92 % --   05/01/19 0524 -- -- -- -- -- -- 177 lb 7.5 oz (80.5 kg)   04/30/19 2045 (!) 139/55 98.1 °F (36.7 °C) Oral 67 16 94 % --   04/30/19 2022 -- -- -- -- -- 91 % --     Gen: No distress, pleasant. HEENT: Normocephalic, atraumatic. CV: Regular rate and rhythm. Resp: No respiratory distress. Abd: Soft, nontender   Ext: No edema. Neuro: Alert, oriented, appropriately interactive. Wt Readings from Last 3 Encounters:   05/01/19 177 lb 7.5 oz (80.5 kg)       Laboratory data:   Lab Results   Component Value Date    WBC 6.5 04/29/2019    HGB 12.9 (L) 04/29/2019    HCT 39.0 (L) 04/29/2019    MCV 94.6 04/29/2019     04/29/2019       Lab Results   Component Value Date     04/29/2019    K 4.5 04/29/2019    CL 98 04/29/2019    CO2 31 04/29/2019    BUN 16 04/29/2019    CREATININE 0.8 04/29/2019    GLUCOSE 104 04/29/2019    CALCIUM 8.9 04/29/2019        Therapy progress:  PT  Position Activity Restriction  Other position/activity restrictions:  AVOID sheer to buttocks, trialling ROHO due to nonintact buttock skin. Flat foot Toe touch weightbearing on RLE, weightbearing as tolerated on LLE. Notify physician for pulse less than 50 or greater than 120, respiratory rate less than 12 or greater than 25,systolic BP less than 90 or greater than 253, diastolic BP less than 50 or greater than 100.   Objective     Sit to Stand: Modified independent(FWW)  Stand to sit: Modified independent(FWW)  Bed to Chair: Modified independent(FWW)  Stand Pivot Transfers: Modified independent(with use of bedrail to w/c)  Device: Rolling Walker  Assistance: Modified Independent  Distance: 10 feet. OT  PT Equipment Recommendations  Equipment Needed: Yes  Mobility Devices: Wheelchair  Walker: Rolling  Wheelchair: Standard  Other: will continue to assess  Toilet - Technique: Stand pivot  Equipment Used: Standard toilet(grab bars)  Toilet Transfers Comments: pt will need a 3-in-1 commode frame for home toilet d/t low surface  Assessment        SLP                Body mass index is 24.07 kg/m². Rehabilitation Diagnosis:   Orthopedic, 8.11, Unilateral Hip Fracture        Assessment and Plan:     Right acetabulum, inferior/superior pubic rami, and sacral fractures - Managed non-operatively. Pain control. TTWB RLE and WBAT LLE. PT/OT.     Intramuscular hematoma (piriformis, obturator) - Monitor Hgb.       COPD exacerbation - Completed steroids. Dulera, spiriva, mucinex, prn duonebs.       HTN - amlodipine     HLD - pravastatin     GERD - pantoprazole     PTSD - Restarted lorazepam at lower dose as he has been off of this for >1 week and is now also on oxycodone. Doing well on this dose and does not feel he needs home dose of 2mg.      Bladder - high risk retention - Monitor PVRs, SC prn >300cc. Flomax.      Bowel - high risk constipation - colace=senna BID, PRN MoM. follow bowel movements. Enema or suppository if needed.      Pain control - Gabapentin (for chronic neuropathy), baclofen, prn oxycodone     DVT PPx - lovenox  Anticipated dispo: home  Services:  PT, OT, RN  DME: wheelchair, walker, TTB, bedside commode  ELOS: TBD once [facility]    [employer].  Praneeth Escobar MD 5/1/2019, 2:16 PM

## 2019-05-02 VITALS
WEIGHT: 177.47 LBS | DIASTOLIC BLOOD PRESSURE: 63 MMHG | HEART RATE: 83 BPM | BODY MASS INDEX: 24.04 KG/M2 | HEIGHT: 72 IN | OXYGEN SATURATION: 92 % | TEMPERATURE: 98.2 F | RESPIRATION RATE: 20 BRPM | SYSTOLIC BLOOD PRESSURE: 135 MMHG

## 2019-05-02 PROBLEM — S32.82XD MULTIPLE CLOSED FRACTURES OF PELVIS WITHOUT DISRUPTION OF PELVIC RING WITH ROUTINE HEALING: Status: ACTIVE | Noted: 2019-05-02

## 2019-05-02 LAB
ANION GAP SERPL CALCULATED.3IONS-SCNC: 6 MMOL/L (ref 3–16)
BASOPHILS ABSOLUTE: 0 K/UL (ref 0–0.2)
BASOPHILS RELATIVE PERCENT: 0.7 %
BUN BLDV-MCNC: 15 MG/DL (ref 7–20)
CALCIUM SERPL-MCNC: 8.8 MG/DL (ref 8.3–10.6)
CHLORIDE BLD-SCNC: 101 MMOL/L (ref 99–110)
CO2: 32 MMOL/L (ref 21–32)
CREAT SERPL-MCNC: 0.9 MG/DL (ref 0.8–1.3)
EOSINOPHILS ABSOLUTE: 0.3 K/UL (ref 0–0.6)
EOSINOPHILS RELATIVE PERCENT: 3.9 %
GFR AFRICAN AMERICAN: >60
GFR NON-AFRICAN AMERICAN: >60
GLUCOSE BLD-MCNC: 98 MG/DL (ref 70–99)
HCT VFR BLD CALC: 36.4 % (ref 40.5–52.5)
HEMOGLOBIN: 12.2 G/DL (ref 13.5–17.5)
LYMPHOCYTES ABSOLUTE: 1.3 K/UL (ref 1–5.1)
LYMPHOCYTES RELATIVE PERCENT: 19.2 %
MCH RBC QN AUTO: 31.4 PG (ref 26–34)
MCHC RBC AUTO-ENTMCNC: 33.4 G/DL (ref 31–36)
MCV RBC AUTO: 93.8 FL (ref 80–100)
MONOCYTES ABSOLUTE: 0.7 K/UL (ref 0–1.3)
MONOCYTES RELATIVE PERCENT: 10.7 %
NEUTROPHILS ABSOLUTE: 4.4 K/UL (ref 1.7–7.7)
NEUTROPHILS RELATIVE PERCENT: 65.5 %
PDW BLD-RTO: 14.5 % (ref 12.4–15.4)
PLATELET # BLD: 361 K/UL (ref 135–450)
PMV BLD AUTO: 7.5 FL (ref 5–10.5)
POTASSIUM REFLEX MAGNESIUM: 4.7 MMOL/L (ref 3.5–5.1)
RBC # BLD: 3.88 M/UL (ref 4.2–5.9)
SODIUM BLD-SCNC: 139 MMOL/L (ref 136–145)
WBC # BLD: 6.7 K/UL (ref 4–11)

## 2019-05-02 PROCEDURE — 6370000000 HC RX 637 (ALT 250 FOR IP): Performed by: PHYSICAL MEDICINE & REHABILITATION

## 2019-05-02 PROCEDURE — 97116 GAIT TRAINING THERAPY: CPT

## 2019-05-02 PROCEDURE — 94640 AIRWAY INHALATION TREATMENT: CPT

## 2019-05-02 PROCEDURE — 80048 BASIC METABOLIC PNL TOTAL CA: CPT

## 2019-05-02 PROCEDURE — 97110 THERAPEUTIC EXERCISES: CPT

## 2019-05-02 PROCEDURE — 85025 COMPLETE CBC W/AUTO DIFF WBC: CPT

## 2019-05-02 PROCEDURE — 36415 COLL VENOUS BLD VENIPUNCTURE: CPT

## 2019-05-02 PROCEDURE — 97535 SELF CARE MNGMENT TRAINING: CPT

## 2019-05-02 PROCEDURE — 97530 THERAPEUTIC ACTIVITIES: CPT

## 2019-05-02 PROCEDURE — 6360000002 HC RX W HCPCS: Performed by: PHYSICAL MEDICINE & REHABILITATION

## 2019-05-02 RX ORDER — PRAVASTATIN SODIUM 40 MG
40 TABLET ORAL DAILY
Qty: 30 TABLET | Refills: 0 | Status: SHIPPED | OUTPATIENT
Start: 2019-05-02

## 2019-05-02 RX ORDER — OXYCODONE HYDROCHLORIDE 5 MG/1
5-10 TABLET ORAL EVERY 6 HOURS PRN
Qty: 50 TABLET | Refills: 0 | Status: SHIPPED | OUTPATIENT
Start: 2019-05-02 | End: 2019-05-09

## 2019-05-02 RX ORDER — ASPIRIN 81 MG/1
81 TABLET ORAL DAILY
Qty: 30 TABLET | Refills: 0 | Status: SHIPPED | OUTPATIENT
Start: 2019-05-02

## 2019-05-02 RX ORDER — GABAPENTIN 400 MG/1
1200 CAPSULE ORAL 3 TIMES DAILY
Qty: 270 CAPSULE | Refills: 0 | Status: SHIPPED | OUTPATIENT
Start: 2019-05-02 | End: 2019-06-01

## 2019-05-02 RX ORDER — BACLOFEN 10 MG/1
10 TABLET ORAL 3 TIMES DAILY
Qty: 90 TABLET | Refills: 0 | Status: SHIPPED | OUTPATIENT
Start: 2019-05-02

## 2019-05-02 RX ORDER — TAMSULOSIN HYDROCHLORIDE 0.4 MG/1
0.4 CAPSULE ORAL NIGHTLY
Qty: 30 CAPSULE | Refills: 0 | Status: SHIPPED | OUTPATIENT
Start: 2019-05-02

## 2019-05-02 RX ORDER — AMLODIPINE BESYLATE 2.5 MG/1
5 TABLET ORAL DAILY
Qty: 60 TABLET | Refills: 0 | Status: SHIPPED | OUTPATIENT
Start: 2019-05-02

## 2019-05-02 RX ORDER — LORAZEPAM 1 MG/1
1 TABLET ORAL NIGHTLY PRN
Qty: 30 TABLET | Refills: 0 | Status: SHIPPED | OUTPATIENT
Start: 2019-05-02 | End: 2019-06-01

## 2019-05-02 RX ORDER — MAGNESIUM OXIDE 400 MG/1
200 TABLET ORAL DAILY
Qty: 30 TABLET | Refills: 0 | Status: SHIPPED | OUTPATIENT
Start: 2019-05-02

## 2019-05-02 RX ADMIN — SENNOSIDES AND DOCUSATE SODIUM 1 TABLET: 8.6; 5 TABLET ORAL at 10:22

## 2019-05-02 RX ADMIN — OXYCODONE HYDROCHLORIDE 10 MG: 5 TABLET ORAL at 15:58

## 2019-05-02 RX ADMIN — TIOTROPIUM BROMIDE 18 MCG: 18 CAPSULE ORAL; RESPIRATORY (INHALATION) at 11:53

## 2019-05-02 RX ADMIN — ENOXAPARIN SODIUM 40 MG: 40 INJECTION SUBCUTANEOUS at 10:23

## 2019-05-02 RX ADMIN — ASPIRIN 81 MG 81 MG: 81 TABLET ORAL at 10:22

## 2019-05-02 RX ADMIN — GUAIFENESIN 600 MG: 600 TABLET, EXTENDED RELEASE ORAL at 10:22

## 2019-05-02 RX ADMIN — MAGNESIUM GLUCONATE 500 MG ORAL TABLET 400 MG: 500 TABLET ORAL at 10:22

## 2019-05-02 RX ADMIN — BACLOFEN 10 MG: 10 TABLET ORAL at 10:22

## 2019-05-02 RX ADMIN — GABAPENTIN 1200 MG: 300 CAPSULE ORAL at 10:23

## 2019-05-02 RX ADMIN — TAMSULOSIN HYDROCHLORIDE 0.4 MG: 0.4 CAPSULE ORAL at 10:22

## 2019-05-02 RX ADMIN — PANTOPRAZOLE SODIUM 40 MG: 40 TABLET, DELAYED RELEASE ORAL at 06:24

## 2019-05-02 RX ADMIN — AMLODIPINE BESYLATE 5 MG: 5 TABLET ORAL at 10:22

## 2019-05-02 RX ADMIN — VITAMIN D, TAB 1000IU (100/BT) 1000 UNITS: 25 TAB at 10:22

## 2019-05-02 RX ADMIN — GABAPENTIN 1200 MG: 300 CAPSULE ORAL at 15:45

## 2019-05-02 RX ADMIN — Medication 2 PUFF: at 11:53

## 2019-05-02 RX ADMIN — BACLOFEN 10 MG: 10 TABLET ORAL at 15:45

## 2019-05-02 RX ADMIN — OXYCODONE HYDROCHLORIDE 10 MG: 5 TABLET ORAL at 10:23

## 2019-05-02 ASSESSMENT — PAIN DESCRIPTION - PAIN TYPE: TYPE: ACUTE PAIN

## 2019-05-02 ASSESSMENT — PAIN DESCRIPTION - LOCATION
LOCATION: BUTTOCKS;HIP
LOCATION: PELVIS;HIP

## 2019-05-02 ASSESSMENT — PAIN DESCRIPTION - ORIENTATION
ORIENTATION: RIGHT
ORIENTATION: RIGHT

## 2019-05-02 ASSESSMENT — PAIN DESCRIPTION - DIRECTION: RADIATING_TOWARDS: LEG

## 2019-05-02 ASSESSMENT — PAIN DESCRIPTION - DESCRIPTORS
DESCRIPTORS: ACHING
DESCRIPTORS: ACHING;SHOOTING;SHARP;RADIATING

## 2019-05-02 ASSESSMENT — PAIN SCALES - GENERAL
PAINLEVEL_OUTOF10: 8

## 2019-05-02 ASSESSMENT — PAIN DESCRIPTION - ONSET: ONSET: GRADUAL

## 2019-05-02 ASSESSMENT — PAIN DESCRIPTION - FREQUENCY: FREQUENCY: CONTINUOUS

## 2019-05-02 ASSESSMENT — PAIN - FUNCTIONAL ASSESSMENT: PAIN_FUNCTIONAL_ASSESSMENT: ACTIVITIES ARE NOT PREVENTED

## 2019-05-02 ASSESSMENT — PAIN DESCRIPTION - PROGRESSION: CLINICAL_PROGRESSION: GRADUALLY WORSENING

## 2019-05-02 NOTE — DISCHARGE SUMMARY
Physical Medicine & Rehabilitation  Discharge Summary     Patient Identification:  Marlon Wong  : 1945  Admit date: 4/10/2019  Discharge date:  2019  Attending provider: Massiel Acevedo MD        Primary care provider: Dixie Roldan    Discharge Diagnoses:   Patient Active Problem List   Diagnosis    Debility    Multiple closed fractures of pelvis without disruption of pelvic ring with routine healing       History of Present Illness/Acute Hospital Course:  Mr. Marlon Wong is a 67 yo M with pmh HTN, HLD, COPD, lumbar radiculopathy, laryngeal cancer s/p resection, and RCC s/p partial nephrectomy who initially presented to [facility] on 19 after sustaining a fall. The patient fell approximately 5 feet from ladder. No LOC. Found to have right acetabular fx, right superior and inferior pubic rami fx, and sacral fx. Non-operative management and toe touch weight bearing was recommended on the RLE. Also with intramuscular hematoma in right obturator and priformis. Course was complicated by COPD exacerbation. Treated with steroids and bronchodilators. Now presents to ARU with impaired mobility and self-care below his baseline. Inpatient Rehabilitation Course:   Marlon Wong is a 68 y.o. male admitted to inpatient rehabilitation on 4/10/2019 with Multiple closed fractures of pelvis without disruption of pelvic ring with routine healing. The patient participated in an aggressive multidisciplinary inpatient rehabilitation program involving 3 hours of therapy per day, at least 5 days per week. He made good progress with transfer techniques, wheelchair mobility, and compensatory strategies. He is now overall modified independent for all mobility and ADLs. Patient initially did not feel comfortable returning home as he is the primary caregiver of his son who has MRDD. He requested SNF referral but was denied coverage for this given high functional level.  Therapists completed home visit with patient and he is now w/c), WB Status: foot flat touch down WB RLe  WBAT LLE  Ambulation 1  Surface: level tile  Device: Rolling Walker  Assistance: Modified Independent  Quality of Gait: step to gait  Distance: 10 feet. Comments: SpO2 87% HR 96bpm immediate post walking with 1 minute PLB SpO2 91% HR 105bpm.  After second walk SpO2 92-94% in first minute [id number]. , Stairs  # Steps : 1  Assistance: Dependent/Total  Mobility: Bed, Chair, Wheel Chair: 6 - Requires assistive device (slide rail)  Walk: 1 - Total Assistance Walks < 50 feet OR requires two or more people OR patient performs < 25% of locomotion effort  Distance Walked: 35'  Wheel Chair: 6 - [address] Operates wheelchair at least 150 feet with an ambulatory device, orthosis or prosthesis OR requires extra amount of time OR there is concern for safety  Distance Traveled in Wheel Chair: 160'  Stairs: 1- Total Assistance perfoms less than 25% of the effort, or requirs the assistance of two people, or goes up and down fewer than 4 stairs, PT Equipment Recommendations  Equipment Needed: Yes  Mobility Devices: Wheelchair  Walker: Rolling  Wheelchair: Standard  Other: will continue to assess, Assessment: Pt continues to be mod I for transfers and wheelchair mobility. Pt ambulating greater distance this session with SBA. Pt continues to demonstrate fatigue with increased activity requiring rest breaks. Occupational therapy: Eatin - Patient feeds self  Groomin - Independent with all tasks using assistive device  Bathin - Able to bathe all 10 areas with device  Dressing-Upper: 6 - Independent with device/prosthesis  Dressing-Lower: 6 - Independent with device/prosthesis  Toiletin - Requires device (grab bar/walker/etc.)  Toilet Transfer: 6 - Independent with device (grab bar/walker/slide bar)  Shower Transfer: 6 - Modified independence,  , Assessment: Pt pleasant and cooperative, performs functional transfers Mod I from multiple surfaces.  Pt mod I for w/c mgmt throughout unit and to outdoor garden area. Pt tolerates B UE ex with red theraband and 3# weights with rest breaks between ex. Cont to await DME delivery for discharge. Cont POC. Speech therapy:  Comprehension: 6 - Complex ideas 90% or device (hearing aid/glasses)  Expression: 6 - Device used to express complex ideas/needs  Social Interaction: 6 - Patient requires medication for mood and/or effect  Problem Solvin - Independent with device (e.g. notes, schedules)  Memory: 6 - Patient requires device to recall (e.g. memory book)      Significant Diagnostics:   Lab Results   Component Value Date    CREATININE 0.9 2019    BUN 15 2019     2019    K 4.7 2019     2019    CO2 32 2019       Lab Results   Component Value Date    WBC 6.7 2019    HGB 12.2 (L) 2019    HCT 36.4 (L) 2019    MCV 93.8 2019     2019       Disposition:  Home  Services:  PT, OT, RN  DME: wheelchair, walker for transfers, TTB, bedside commode, ramp    Discharge Condition: Stable    Follow-up:  See after visit summary from hospitalization    Discharge Medications:     Medication List      START taking these medications    oxyCODONE 5 MG immediate release tablet  Commonly known as:  ROXICODONE  Take 1-2 tablets by mouth every 6 hours as needed for Pain for up to 7 days. CHANGE how you take these medications    gabapentin 400 MG capsule  Commonly known as:  NEURONTIN  Take 3 capsules by mouth 3 times daily for 30 days. What changed:    · medication strength  · how much to take     LORazepam 1 MG tablet  Commonly known as:  ATIVAN  Take 1 tablet by mouth nightly as needed for Anxiety for up to 30 days.   What changed:    · medication strength  · how much to take  · when to take this     magnesium oxide 400 MG tablet  Commonly known as:  MAG-OX  Take 0.5 tablets by mouth daily  What changed:  how much to take        CONTINUE taking these medications    amLODIPine 2.5 MG tablet  Commonly known as:  NORVASC  Take 2 tablets by mouth daily     aspirin 81 MG EC tablet  Take 1 tablet by mouth daily     baclofen 10 MG tablet  Commonly known as:  LIORESAL  Take 1 tablet by mouth 3 times daily     omeprazole 20 MG delayed release capsule  Commonly known as:  PRILOSEC  Notes to patient:  Reduces stomach acid     pravastatin 40 MG tablet  Commonly known as:  PRAVACHOL  Take 1 tablet by mouth daily     SYMBICORT 160-4.5 MCG/ACT Aero  Generic drug:  budesonide-formoterol     tamsulosin 0.4 MG capsule  Commonly known as:  FLOMAX  Take 1 capsule by mouth nightly     tiotropium 18 MCG inhalation capsule  Commonly known as:  SPIRIVA     vitamin D 1000 units Tabs tablet  Commonly known as:  CHOLECALCIFEROL  Notes to patient:  Supplement            Where to Get Your Medications      You can get these medications from any pharmacy    Bring a paper prescription for each of these medications  · amLODIPine 2.5 MG tablet  · aspirin 81 MG EC tablet  · baclofen 10 MG tablet  · gabapentin 400 MG capsule  · LORazepam 1 MG tablet  · magnesium oxide 400 MG tablet  · oxyCODONE 5 MG immediate release tablet  · pravastatin 40 MG tablet  · tamsulosin 0.4 MG capsule           I spent over 35 minutes on this discharge encounter between counseling, coordination of care, and medication reconciliation.       Angel Patel MD

## 2019-05-02 NOTE — PROGRESS NOTES
position/activity restrictions:  AVOID sheer to buttocks, trialling ROHO due to nonintact buttock skin. Flat foot Toe touch weightbearing on RLE, weightbearing as tolerated on LLE. Notify physician for pulse less than 50 or greater than 120, respiratory rate less than 12 or greater than 25,systolic BP less than 90 or greater than 925, diastolic BP less than 50 or greater than 100. Subjective   General  Chart Reviewed: Yes  Patient assessed for rehabilitation services?: Yes  Additional Pertinent Hx: anxiety, COPD, emphysema, GERD, HTN, PTSD, lumbar radiculopathy, pelvic fx, sacral fx, fracture of R hip, pubic rami fx, laryngeal CA s/p sx, RCC s/p partial nephrectomy  Family / Caregiver Present: No  Referring Practitioner: Jolanta Smith MD  Diagnosis: fall, s/p multiple fractures  Subjective  Subjective: Pt in bed on arrival, agreeable to treatment  General Comment  Comments: RN cleared for treatment  Pain Assessment  Pain Level: 8  Pain Location: Pelvis; Hip  Pain Orientation: Right  Pain Descriptors: Aching  Non-Pharmaceutical Pain Intervention(s): Shower; Emotional support  Vital Signs  Patient Currently in Pain: Yes   Orientation  Orientation  Overall Orientation Status: Within Functional Limits  Objective    ADL  Feeding: Independent  Grooming: Modified independent (seated at sink in w/c)  UE Bathing: Modified independent   LE Bathing: Modified independent   UE Dressing: Modified independent   LE Dressing: Modified independent   Toileting: Modified independent   Additional Comments: pt demos ability to utilize AE when needed        Balance  Sitting Balance: Independent  Standing Balance: Modified independent   Standing Balance  Time: <1 minute x6  Activity: transfers  Sit to stand: Modified independent  Stand to sit: Modified independent  Functional Mobility  Functional - Mobility Device: Wheelchair  Activity: To/from bathroom  Assist Level: Modified independent   Toilet Transfers  Toilet - Technique: Stand Time Out 1100         Minutes 30         Timed Code Treatment Minutes: 30 Minutes    Therapy Time   Individual Concurrent Group Co-treatment   Time In 1230         Time Out 1330         Minutes 60         Timed Code Treatment Minutes: [address], OT

## 2019-05-02 NOTE — DISCHARGE SUMMARY
Occupational Therapy  Discharge Summary     Name:Chris John  DFV:[id number]  :1945  Treatment Diagnosis: Debility  Diagnosis: Debility        Lower Extremity Weight Bearing Restrictions  Right Lower Extremity Weight Bearing: Flat Foot Weight Bearing  Left Lower Extremity Weight Bearing: Weight Bearing As Tolerated       Position Activity Restriction  Other position/activity restrictions:  AVOID sheer to buttocks, trialling ROHO due to nonintact buttock skin. Flat foot Toe touch weightbearing on RLE, weightbearing as tolerated on LLE. Notify physician for pulse less than 50 or greater than 120, respiratory rate less than 12 or greater than 25,systolic BP less than 90 or greater than 934, diastolic BP less than 50 or greater than 100. Goals:   Short term goals  Time Frame for Short term goals: to be met by 19  Short term goal 1: Pt will complete LB ADLs with min A with AE as needed-met   Short term goal 2: Pt will complete functional transfers with Min A-met 4/15  Short term goal 3: Pt will complete toileting with min A or less-met   Short term goal 4: pt will adhere to WB status with all transfers and mobility-met    Long term goals  Time Frame for Long term goals : to be met 19  Long term goal 1: Pt will complete ADLs Mod I with AE as needed GOAL MET 19  Long term goal 2: Pt will complete all fucntional transfers Mod I-met   Long term goal 3: Pt will complete toileting Mod I-met   Long term goal 4: Pt will tolerate standing at sink for grooming tasks >5 min w/o fatigue or LOB -not met d/t WB status  Long term goal 5: Pt will complete light home mgmt/meal prep tasks Mod I-met     Pt. Met  short term goals and  long term goals.      Current Functional Status:   ADL  Feeding: Independent  Grooming: Modified independent (seated at sink in w/c)  UE Bathing: Modified independent   LE Bathing: Modified independent   UE Dressing: Modified independent   LE Dressing: Modified independent   Toileting: Modified independent   Additional Comments: pt demos ability to utilize AE when needed    Bed mobility  Bridging: Independent  Rolling to Left: Independent  Rolling to Right: Independent  Supine to Sit: Independent  Sit to Supine: Independent  Scooting: Independent(to EOB and further HOB)  Comment: Patient requests use of WC  for tranport into bathroom and HOBE with bed rail for trasnfer without use of FWW. However pt agreable tin gym to transfer to mat (low) with FWW to from [address] [place] [address]. PT notes he has a hospital bed at home. Functional Transfers: Toilet Transfers  Toilet - Technique: Stand pivot  Equipment Used: Standard toilet(grab bars)  Toilet Transfer: Modified independent  Toilet Transfers Comments: pt will need a 3-in-1 commode frame for home toilet d/t low surface         Shower Transfers  Shower - Transfer Type: To and From  Shower - Transfer To:  Transfer tub bench  Shower - Technique: Stand pivot  Shower Transfers: Modified independence  Shower Transfers Comments: pt will need TTB d/t WB status           Functional Mobility  Functional - Mobility Device: Wheelchair  Activity: To/from bathroom  Assist Level: Modified independent     Instrumental ADL's  Instrumental ADLs: Yes  Meal Prep  Meal Prep Level: Wheelchair  Meal Prep Level of Assistance: Modified independent  Meal Preparation: pt able to retrieve items from fridge/freezer, simulate microwaving and transporting food on plate from counter to table mod I. Encouraged pt to keep plates, etc on low shelves or on counter to minimize standing       UE Function: [facility]                Admitting OT FIM scores  Eatin - Patient feeds self  Groomin - Requires setup/cues to do all tasks  Bathin - Able to bathe 3-4 areas  Dressing-Upper: 4 - Requires assist with buttons/zippers only and/or requires assist with one arm only  Dressing-Lower: 2 - Requires assist with 4-5 parts of dressing  Toileting: 3 - Able to perform 2 tasks  Toilet Transfer: 2 - Requires 50-74% assist getting off toilet  Shower Transfer: 0 - Activity does not occur(pt refused)    Discharge OT FIM scores  Eatin - Patient feeds self  Groomin - Independent with all tasks using assistive device  Bathin - Able to bathe all 10 areas with device  Dressing-Upper: 6 - Independent with device/prosthesis  Dressing-Lower: 6 - Independent with device/prosthesis  Toiletin - Requires device (grab bar/walker/etc.)  Toilet Transfer: 6 - Independent with device (grab bar/walker/slide bar)  Shower Transfer: 6 - Modified independence    Assessment:   Assessment: Pt pleasant and cooperative, performs all functional transfes MOd I from all surfaces. Pt able to complete UB/LB bathing/dressing Mod I, good use fo AE if needed. Pt's w/c and RW delivered this date, pt mod I with transfers and w/c mgmt in personal w/c. Bathroom DME to be delivered. Pt has met all goals, plan for d/c this date.    Prognosis: Good     REQUIRES OT FOLLOW UP: Yes  Discharge Recommendations: Home with Home health OT, Home with assist PRN    Equipment Recommendations:  BSC frame for toilet, TTB         Home Exercise Program  Provided Pt with written HEP for free weights and theraband    Electronically signed by Jalil Staples OT on 2019 at 1:11 PM

## 2019-05-02 NOTE — DISCHARGE SUMMARY
Physical Therapy  Discharge Summary    Name:Chris Maza  YMU:[id number]  :1945  Treatment Diagnosis: Debility  Diagnosis: Debility        Lower Extremity Weight Bearing Restrictions  Right Lower Extremity Weight Bearing: Flat Foot Weight Bearing  Left Lower Extremity Weight Bearing: Weight Bearing As Tolerated       Position Activity Restriction  Other position/activity restrictions:  AVOID sheer to buttocks, trialling ROHO due to nonintact buttock skin. Flat foot Toe touch weightbearing on RLE, weightbearing as tolerated on LLE. Notify physician for pulse less than 50 or greater than 120, respiratory rate less than 12 or greater than 25,systolic BP less than 90 or greater than 486, diastolic BP less than 50 or greater than 100. Goals:                  Short term goals  Time Frame for Short term goals: To be met by 19  Short term goal 1: Pt to perform supine to/from sit transfers at flat bed without rails with supervision. GOAL met 4/15/2019 Pt demo supervision/SBA for sup<>sit transfers. Short term goal 2: Pt to perform sit to/from stand at [facility] with CGA. GOAL met 2019 CGA with cues for sit<>stand with FWW from mat/chair/WC. Short term goal 3: Pt to ambulate 50 ft with LRAD with CGA for household mobility. GOAL not 2019 met PT SYL for transfer distance walking but requires supervision/SBA for gait greater distances walks 20 feet wtih FWW RLE footflat TDWB LLE WBAT with further distances limited by Fx (pelvis and acetabulum)  associated pain, WB limitation and limited endurance. PT supervision walking 88 feet wtih FWW and WC follow and consistently SYL for trasnfer distance walking up to 10 feet employing appropriate gait technique and WB precautions. Short term goal 4: Pt to propel wheelchair 50 ft on level surface with mod I for household mobility.  GOAL MET 2019 Patient demo 205 feet SYL WC propulsion level and carpet with left/.right tuns and thru doorways  Short term goal 5: Pt to demonstrate car transfer with RW with mod A x 1. GOAL met 4/19/2019 on return demo pt superivsion with car transfer with car transfer simulator  with FWW TDWB foot flat RLE  to/from WC. Long term goals  Time Frame for Long term goals : To be met by 4/25/19  Long term goal 1: Pt to perform supine to/from sit transfers at flat bed without rails with mod I. GOAL MET 4/19/2019 Pt indep sup<>sit from [facility] flat without use of rails   Long term goal 2: Pt to perform sit to/from stand at LRAD with mod I. GOAL met 4/23/2019 pt demo  SYL with sit<.stand with FWW and WBAT LLE and TDWB RLE. Long term goal 3: Pt to propel WC  150 ft with LRAD with mod I for community mobility. 4/22/2019  GOAL met pt demo  feet propulsion with WC including 10 foot ramp left/right turn and carpet. Long term goal 4: Pt to manage 1 curb step with wheelchair or LRAD with mod I for home entry. DC this goal4/23/2019 and recommend ramped entrance as suggested to patient  as pt not appriate for steps from safety perspective due to pelvic Fx acetabular Fx with WBAT LLE and TDWB RLE with foot flat. GOAL met pt able to safely negotiate up and down 20 foot ramp in [address]. Long term goal 5: /    Pt. Met 5/5 short term goals and 5/5 long term goals.      Admitting PT FIM scores:  Bed, Chair, Wheel Chair: 2 - Requires 50-74% assistance to transfer  Walk: 1 - Total Assistance Walks < 50 feet OR requires two or more people OR patient performs < 25% of locomotion effort  Distance Walked: 3 ft  Wheel Chair: 5 - Supervision Requires standby supervision or cuing to operate wheelchair at least 150 feet  Distance Traveled in [address]: 185 ft  Stairs: 0 - Activity Does not Occur ( 0 only for the admission assessment)    Discharge PT FIM scores:  Bed, Chair, Wheel Chair: 6 - Requires assistive device (slide rail)  Walk: 5 - Supervision Requires standby supervision or cuing to walk at least 150 feet  Distance Walked: 80'  Wheel Chair: 6 - [address] [place] [place] [address] Operates wheelchair at least 150 feet with an ambulatory device, orthosis or prosthesis OR requires extra amount of time OR there is concern for safety  Distance Traveled in Wheel Chair: 1413'  Stairs: 1- Total Assistance perfoms less than 25% of the effort, or requirs the assistance of two people, or goes up and down fewer than 4 stairs      Pt. Currently ambulates 88 feet with supervision and FWW RLE foot flat TDWB LLE WBAT, and SYL for transfer distance gait up to 10 feet with FWW RLE TDWB foot flat LLE WBAT  Up/down  steps with WC dependently (bump up and down steps dependently or uses ramp (Indep in ramp 20' in [facility])   Up/down curb step with dependent in WC. Sit to/from stand with FWW RLE foot flat TDWB LLE WBAT independent. Bed mobility with [place]  Recommend ramp, 18 inch WC, FWW and HHPT upon advancement of WB by ortho  in order to progress to [address]. Pt. Safe to return home with assistance from family or HHA for community transport and higher level ADL's (shopping, cleaning, laundry) . Provided pt. with written hpe of CHAI miller with pt independent in hep.    Electronically signed by Cresencio Torres PT on 5/2/2019 at 5:31 PM

## 2019-05-02 NOTE — PROGRESS NOTES
Physical Therapy  Facility/Department: [facility]  Daily Treatment Note  NAME: Mireya Sharp  : 1945  MRN: [id number]    Date of Service: 2019    Discharge Recommendations:  Continue to assess pending progress        Patient Diagnosis(es): The primary encounter diagnosis was Multiple closed fractures of pelvis without disruption of pelvic ring with routine healing. A diagnosis of Anxiety was also pertinent to this visit. has a past medical history of Allergic rhinitis, Anxiety, COPD (chronic obstructive pulmonary disease) ([facility] [address] Emphysema of lung ([facility] [address] GERD (gastroesophageal reflux disease), Hypercholesteremia, Hypertension, Lumbar radiculopathy, and PTSD (post-traumatic stress disorder). has no past surgical history on file. Restrictions  Lower Extremity Weight Bearing Restrictions  Right Lower Extremity Weight Bearing: Flat Foot Weight Bearing  Left Lower Extremity Weight Bearing: Weight Bearing As Tolerated  Position Activity Restriction  Other position/activity restrictions:  AVOID sheer to buttocks, trialling ROHO due to nonintact buttock skin. Flat foot Toe touch weightbearing on RLE, weightbearing as tolerated on LLE. Notify physician for pulse less than 50 or greater than 120, respiratory rate less than 12 or greater than 25,systolic BP less than 90 or greater than 043, diastolic BP less than 50 or greater than 100. Subjective : Pt non-specific about Fx pain and  reports pain 8/10.             Orientation: WNL     Cognition   Cognition  Overall Cognitive Status: WFL  Objective   Bed mobility  Rolling to Right: Independent  Supine to Sit: Independent  Sit to Supine: Independent  Scooting: Independent  Transfers  Sit to Stand: Modified independent  Stand to sit: Modified independent  Bed to Chair: Modified independent  Car Transfer: Modified independent  Comment: FWW ith all transfers except bed<>WC SPT no AD other than WC, sit<>stand multiple trials from/to chair SYL TDWB RLE and WBAT advanced by ortho. Pt consistently and safely employs WB precautions with LE therex and mobility with WC, transfers and transfer distance gait. Goals  Short term goals  Time Frame for Short term goals: To be met by 4/18/19  Short term goal 1: Pt to perform supine to/from sit transfers at flat bed without rails with supervision. GOAL met 4/15/2019 Pt demo supervision/SBA for sup<>sit transfers. Short term goal 2: Pt to perform sit to/from stand at [facility] with CGA. GOAL met 4/17/2019 CGA with cues for sit<>stand with FWW from mat/chair/WC. Short term goal 3: Pt to ambulate 50 ft with LRAD with CGA for household mobility. GOAL not 4/23/2019 met PT SYL for transfer distance walking but requires supervision/SBA for gait greater distances walks 20 feet wtih FWW RLE footflat TDWB LLE WBAT with further distances limited by Fx (pelvis and acetabulum)  associated pain, WB limitation and limited endurance. PT supervision walking 88 feet wtih FWW and WC follow and consistently SYL for trasnfer distance walking up to 10 feet employing appropriate gait technique and WB precautions. Short term goal 4: Pt to propel wheelchair 50 ft on level surface with mod I for household mobility. GOAL MET 4/12/2019 Patient demo 205 feet SYL WC propulsion level and carpet with left/.right tuns and thru doorways  Short term goal 5: Pt to demonstrate car transfer with RW with mod A x 1. GOAL met 4/19/2019 on return demo pt superivsion with car transfer with car transfer simulator  with FWW TDWB foot flat RLE  to/from WC. Long term goals  Time Frame for Long term goals : To be met by 4/25/19  Long term goal 1: Pt to perform supine to/from sit transfers at flat bed without rails with mod I. GOAL MET 4/19/2019 Pt indep sup<>sit from [facility] flat without use of rails   Long term goal 2: Pt to perform sit to/from stand at LRAD with mod I. GOAL met 4/23/2019 pt demo  SYL with sit<.stand with FWW and WBAT LLE and TDWB RLE.

## 2019-05-02 NOTE — CARE COORDINATION
Received call from Arvind Hoang with [employer] stating that patient's wheelchair and Rolling walker will be delivered today around 10:30am to his room. Met with patient at bedside. He wants to go home today once his DME is delivered. He stated he would have a ride home and is asking about his medications. Explained that the Bedside Commode and Tub Transfer Bench will be delivered to his house from the [place]. He stated that he can obtain them from a store if needed before they come. SW also inquired about the Ramp that is recommended. He stated that he can work on this from home and he just wants to go home. Notified Dr. Shikha Cheatham on the above.      Dano Suárez MSW, LSW